# Patient Record
Sex: MALE | Race: OTHER | HISPANIC OR LATINO | URBAN - METROPOLITAN AREA
[De-identification: names, ages, dates, MRNs, and addresses within clinical notes are randomized per-mention and may not be internally consistent; named-entity substitution may affect disease eponyms.]

---

## 2018-05-10 ENCOUNTER — INPATIENT (INPATIENT)
Facility: HOSPITAL | Age: 58
LOS: 7 days | Discharge: HOME | DRG: 615 | End: 2018-05-18
Attending: NEUROLOGICAL SURGERY | Admitting: NEUROLOGICAL SURGERY
Payer: COMMERCIAL

## 2018-05-10 VITALS
OXYGEN SATURATION: 98 % | TEMPERATURE: 98 F | HEART RATE: 92 BPM | SYSTOLIC BLOOD PRESSURE: 175 MMHG | RESPIRATION RATE: 17 BRPM | DIASTOLIC BLOOD PRESSURE: 98 MMHG

## 2018-05-10 DIAGNOSIS — K21.9 GASTRO-ESOPHAGEAL REFLUX DISEASE WITHOUT ESOPHAGITIS: ICD-10-CM

## 2018-05-10 DIAGNOSIS — I10 ESSENTIAL (PRIMARY) HYPERTENSION: ICD-10-CM

## 2018-05-10 DIAGNOSIS — D35.2 BENIGN NEOPLASM OF PITUITARY GLAND: ICD-10-CM

## 2018-05-10 DIAGNOSIS — Z90.49 ACQUIRED ABSENCE OF OTHER SPECIFIED PARTS OF DIGESTIVE TRACT: Chronic | ICD-10-CM

## 2018-05-10 RX ORDER — DEXAMETHASONE 0.5 MG/5ML
4 ELIXIR ORAL EVERY 6 HOURS
Qty: 0 | Refills: 0 | Status: DISCONTINUED | OUTPATIENT
Start: 2018-05-11 | End: 2018-05-11

## 2018-05-10 RX ORDER — DEXAMETHASONE 0.5 MG/5ML
10 ELIXIR ORAL EVERY 6 HOURS
Qty: 0 | Refills: 0 | Status: COMPLETED | OUTPATIENT
Start: 2018-05-10 | End: 2018-05-11

## 2018-05-10 RX ORDER — SENNA PLUS 8.6 MG/1
2 TABLET ORAL AT BEDTIME
Qty: 0 | Refills: 0 | Status: DISCONTINUED | OUTPATIENT
Start: 2018-05-10 | End: 2018-05-17

## 2018-05-10 RX ORDER — ACETAMINOPHEN 500 MG
650 TABLET ORAL EVERY 6 HOURS
Qty: 0 | Refills: 0 | Status: DISCONTINUED | OUTPATIENT
Start: 2018-05-10 | End: 2018-05-17

## 2018-05-10 RX ORDER — INSULIN LISPRO 100/ML
VIAL (ML) SUBCUTANEOUS
Qty: 0 | Refills: 0 | Status: DISCONTINUED | OUTPATIENT
Start: 2018-05-10 | End: 2018-05-17

## 2018-05-10 RX ORDER — ENOXAPARIN SODIUM 100 MG/ML
40 INJECTION SUBCUTANEOUS AT BEDTIME
Qty: 0 | Refills: 0 | Status: DISCONTINUED | OUTPATIENT
Start: 2018-05-10 | End: 2018-05-14

## 2018-05-10 RX ORDER — LOSARTAN POTASSIUM 100 MG/1
100 TABLET, FILM COATED ORAL DAILY
Qty: 0 | Refills: 0 | Status: DISCONTINUED | OUTPATIENT
Start: 2018-05-10 | End: 2018-05-17

## 2018-05-10 RX ORDER — PANTOPRAZOLE SODIUM 20 MG/1
40 TABLET, DELAYED RELEASE ORAL DAILY
Qty: 0 | Refills: 0 | Status: DISCONTINUED | OUTPATIENT
Start: 2018-05-10 | End: 2018-05-17

## 2018-05-10 RX ORDER — DOCUSATE SODIUM 100 MG
100 CAPSULE ORAL THREE TIMES A DAY
Qty: 0 | Refills: 0 | Status: DISCONTINUED | OUTPATIENT
Start: 2018-05-10 | End: 2018-05-17

## 2018-05-10 RX ORDER — DEXAMETHASONE 0.5 MG/5ML
ELIXIR ORAL
Qty: 0 | Refills: 0 | Status: DISCONTINUED | OUTPATIENT
Start: 2018-05-11 | End: 2018-05-11

## 2018-05-10 RX ADMIN — Medication 102 MILLIGRAM(S): at 19:40

## 2018-05-10 RX ADMIN — ENOXAPARIN SODIUM 40 MILLIGRAM(S): 100 INJECTION SUBCUTANEOUS at 21:41

## 2018-05-10 RX ADMIN — Medication 650 MILLIGRAM(S): at 17:07

## 2018-05-10 RX ADMIN — Medication 650 MILLIGRAM(S): at 16:52

## 2018-05-10 RX ADMIN — LOSARTAN POTASSIUM 100 MILLIGRAM(S): 100 TABLET, FILM COATED ORAL at 16:53

## 2018-05-10 NOTE — H&P ADULT - ASSESSMENT
57 year old male w/ HTN with pituitary adenoma and left CN VI palsy transferred from outside facility for further neurosurgical management.

## 2018-05-10 NOTE — H&P ADULT - ATTENDING COMMENTS
Patient seen and examined. Agree with above note. Patient presented with L retro/periorbital headaches and L VI nerve palsy, as well as decreased VA in OS. 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 Invasion of L cavernous sinus explains L VI palsy, but decreased acuity is not explained by MRI findings, since lesion does not reach optic apparatus superiorly. Differential diagnosis is broad and includes: invasive pituitary adenoma, chordoma, chondrosarcoma, mets, and other sellar region pathologies. Hormonal workup is normal, except for a slight increase in PRL to 30. Patient will require endoscopic endonasal resection for diagnosis and debulking. Cavernous sinus component would likely remain and require radiation therapy. Plan for OR on Tuesday 05/15/18 with ENT (Dr. Stewart). Neuro-ophthalmology to assess patient preop.

## 2018-05-10 NOTE — H&P ADULT - HISTORY OF PRESENT ILLNESS
57 year old Czech speaking male with HTN presenting to [facility] this past weekend with complaints of several weeks of dizziness, vomiting and blurriness of vision in the left eye. He has seen his PCP for these symptoms which would intermittently resolve. He called EMS when symptoms worsened with multiple episodes of vomiting. 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 Uses Aspirin daily as outpatient. Denies any hearing changes, bowel/bladder changes, extremity weakness, falls, seizures, syncope. Transferred to [facility] for definitive treatment.

## 2018-05-10 NOTE — H&P ADULT - NSHPPHYSICALEXAM_GEN_ALL_CORE
Gen: NAD, AAOx3  HEENT: PERRL. Unable to look to left lateral with left eye. VF grossly intact. NC/AT.  Neck: FROM, nontender  Lungs: Clear b/l. No W/R/R.  Heart: S1, S2. NSR. No murmurs.  Abd: Soft, obese, NT/ND. +BS  Exts: Pulses 2+ throughout  Neuro: Left CN VI palsy, o/w CNs II-XII intact. 5/5 str x4 extremities. Sensation to LT intact. Following commands.

## 2018-05-10 NOTE — PATIENT PROFILE ADULT. - VISION (WITH CORRECTIVE LENSES IF THE PATIENT USUALLY WEARS THEM):
Normal vision: sees adequately in most situations; can see medication labels, newsprint/blurry vision upon admission due to tumor. Patients baseline is normal with no glasses needed

## 2018-05-10 NOTE — H&P ADULT - PROBLEM SELECTOR PLAN 1
Will d/w Dr. Valenzuela for further management and timing of surgical intervention,  Outside imaging to be uploaded for review

## 2018-05-11 LAB
ANION GAP SERPL CALC-SCNC: 12 MMOL/L — SIGNIFICANT CHANGE UP (ref 5–17)
APTT BLD: 35.3 SEC — SIGNIFICANT CHANGE UP (ref 27.5–37.4)
BUN SERPL-MCNC: 11 MG/DL — SIGNIFICANT CHANGE UP (ref 7–23)
CALCIUM SERPL-MCNC: 10 MG/DL — SIGNIFICANT CHANGE UP (ref 8.4–10.5)
CHLORIDE SERPL-SCNC: 101 MMOL/L — SIGNIFICANT CHANGE UP (ref 96–108)
CO2 SERPL-SCNC: 28 MMOL/L — SIGNIFICANT CHANGE UP (ref 22–31)
CREAT SERPL-MCNC: 1.07 MG/DL — SIGNIFICANT CHANGE UP (ref 0.5–1.3)
GLUCOSE SERPL-MCNC: 150 MG/DL — HIGH (ref 70–99)
HBA1C BLD-MCNC: 5.6 % — SIGNIFICANT CHANGE UP (ref 4–5.6)
HCT VFR BLD CALC: 50.1 % — HIGH (ref 39–50)
HGB BLD-MCNC: 17.4 G/DL — HIGH (ref 13–17)
INR BLD: 1.09 — SIGNIFICANT CHANGE UP (ref 0.88–1.16)
MAGNESIUM SERPL-MCNC: 2.3 MG/DL — SIGNIFICANT CHANGE UP (ref 1.6–2.6)
MCHC RBC-ENTMCNC: 31.2 PG — SIGNIFICANT CHANGE UP (ref 27–34)
MCHC RBC-ENTMCNC: 34.7 G/DL — SIGNIFICANT CHANGE UP (ref 32–36)
MCV RBC AUTO: 89.8 FL — SIGNIFICANT CHANGE UP (ref 80–100)
PHOSPHATE SERPL-MCNC: 1.8 MG/DL — LOW (ref 2.5–4.5)
PLATELET # BLD AUTO: 217 K/UL — SIGNIFICANT CHANGE UP (ref 150–400)
POTASSIUM SERPL-MCNC: 5.5 MMOL/L — HIGH (ref 3.5–5.3)
POTASSIUM SERPL-SCNC: 5.5 MMOL/L — HIGH (ref 3.5–5.3)
PROTHROM AB SERPL-ACNC: 12.1 SEC — SIGNIFICANT CHANGE UP (ref 9.8–12.7)
RBC # BLD: 5.58 M/UL — SIGNIFICANT CHANGE UP (ref 4.2–5.8)
RBC # FLD: 12.3 % — SIGNIFICANT CHANGE UP (ref 10.3–16.9)
SODIUM SERPL-SCNC: 141 MMOL/L — SIGNIFICANT CHANGE UP (ref 135–145)
WBC # BLD: 5.4 K/UL — SIGNIFICANT CHANGE UP (ref 3.8–10.5)
WBC # FLD AUTO: 5.4 K/UL — SIGNIFICANT CHANGE UP (ref 3.8–10.5)

## 2018-05-11 PROCEDURE — 71045 X-RAY EXAM CHEST 1 VIEW: CPT | Mod: 26

## 2018-05-11 RX ORDER — METOPROLOL TARTRATE 50 MG
5 TABLET ORAL ONCE
Qty: 0 | Refills: 0 | Status: COMPLETED | OUTPATIENT
Start: 2018-05-11 | End: 2018-05-11

## 2018-05-11 RX ORDER — DEXAMETHASONE 0.5 MG/5ML
4 ELIXIR ORAL EVERY 6 HOURS
Qty: 0 | Refills: 0 | Status: DISCONTINUED | OUTPATIENT
Start: 2018-05-11 | End: 2018-05-17

## 2018-05-11 RX ADMIN — Medication 2: at 21:46

## 2018-05-11 RX ADMIN — Medication 4: at 11:53

## 2018-05-11 RX ADMIN — Medication 5 MILLIGRAM(S): at 14:16

## 2018-05-11 RX ADMIN — Medication 4 MILLIGRAM(S): at 19:54

## 2018-05-11 RX ADMIN — LOSARTAN POTASSIUM 100 MILLIGRAM(S): 100 TABLET, FILM COATED ORAL at 06:17

## 2018-05-11 RX ADMIN — Medication 102 MILLIGRAM(S): at 11:53

## 2018-05-11 RX ADMIN — PANTOPRAZOLE SODIUM 40 MILLIGRAM(S): 20 TABLET, DELAYED RELEASE ORAL at 11:54

## 2018-05-11 RX ADMIN — Medication 8: at 18:06

## 2018-05-11 RX ADMIN — Medication 102 MILLIGRAM(S): at 06:17

## 2018-05-11 RX ADMIN — Medication 102 MILLIGRAM(S): at 01:00

## 2018-05-11 RX ADMIN — ENOXAPARIN SODIUM 40 MILLIGRAM(S): 100 INJECTION SUBCUTANEOUS at 21:47

## 2018-05-11 NOTE — PROGRESS NOTE ADULT - SUBJECTIVE AND OBJECTIVE BOX
HPI:  57 year old Hungarian speaking male with HTN presenting to [facility] this past weekend with complaints of several weeks of dizziness, vomiting and blurriness of vision in the left eye. He has seen his PCP for these symptoms which would intermittently resolve. He called EMS when symptoms worsened with multiple episodes of vomiting. 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 Uses Aspirin daily as outpatient. Denies any hearing changes, bowel/bladder changes, extremity weakness, falls, seizures, syncope. Transferred to [facility] for definitive treatment. (10 May 2018 16:32)    OVERNIGHT EVENTS: BETHEL overnight. Pt reports consistent left eye blurry vision. Denies headache, nausea, acute weakness/loss of sensation of extremities.    Vital Signs Last 24 Hrs  T(C): 36.4 (11 May 2018 08:35), Max: 37 (10 May 2018 17:31)  T(F): 97.5 (11 May 2018 08:35), Max: 98.6 (10 May 2018 17:31)  HR: 89 (11 May 2018 08:35) (69 - 92)  BP: 146/84 (11 May 2018 08:35) (121/79 - 175/98)  BP(mean): --  RR: 16 (11 May 2018 08:35) (16 - 17)  SpO2: 96% (11 May 2018 08:35) (96% - 98%)    I&O's Summary    10 May 2018 07:01  -  11 May 2018 07:00  --------------------------------------------------------  IN: 720 mL / OUT: 500 mL / NET: 220 mL    PHYSICAL EXAM:  Neurological: AAOx3, FC, speech coherent  CNII-XII: R EOM intact, L CN6 palsy, visual fields intact, PERRL, face symmetric, tongue midline  Motor: MAEx4 5/5 UE and LE B/L  SILT throughout         [x] warm well perfused; capillary refill <3 seconds     TUBES/LINES:  [] Ibry  [] Lumbar Drain  [] Wound Drains  [] Others    DIET:  [] NPO  [x] Mechanical  [] Tube feeds    LABS:                        17.4   5.4   )-----------( 217      ( 11 May 2018 06:45 )             50.1     05-11    141  |  101  |  11  ----------------------------<  150<H>  5.5<H>   |  28  |  1.07    Ca    10.0      11 May 2018 06:45  Phos  1.8     05-11  Mg     2.3     05-11      PT/INR - ( 11 May 2018 06:45 )   PT: 12.1 sec;   INR: 1.09          PTT - ( 11 May 2018 06:45 )  PTT:35.3 sec        CAPILLARY BLOOD GLUCOSE      POCT Blood Glucose.: 114 mg/dL (11 May 2018 08:00)  POCT Blood Glucose.: 124 mg/dL (10 May 2018 21:44)      Drug Levels: [] N/A    CSF Analysis: [] N/A      Allergies    penicillin (Rash)    Intolerances      MEDICATIONS:  Antibiotics:    Neuro:  acetaminophen   Tablet 650 milliGRAM(s) Oral every 6 hours PRN  acetaminophen   Tablet. 650 milliGRAM(s) Oral every 6 hours PRN    Anticoagulation:  enoxaparin Injectable 40 milliGRAM(s) SubCutaneous at bedtime    OTHER:  dexamethasone  IVPB 10 milliGRAM(s) IV Intermittent every 6 hours  dexamethasone  IVPB 4 milliGRAM(s) IV Intermittent every 6 hours  dexamethasone  IVPB   IV Intermittent   docusate sodium 100 milliGRAM(s) Oral three times a day PRN  insulin lispro (HumaLOG) corrective regimen sliding scale   SubCutaneous Before meals and at bedtime  losartan 100 milliGRAM(s) Oral daily  pantoprazole    Tablet 40 milliGRAM(s) Oral daily  senna 2 Tablet(s) Oral at bedtime PRN    IVF:    CULTURES:    RADIOLOGY & ADDITIONAL TESTS:      ASSESSMENT:  57y Male w/ HTN with pituitary adenoma and left CN VI palsy transferred from outside facility for further neurosurgical management    BRAIN TUMOR  No h/o HF  No pertinent family history in first degree relatives  Handoff  MEWS Score  HTN (hypertension)  GERD (gastroesophageal reflux disease)  HTN (hypertension)  Pituitary adenoma  History of cholecystectomy      PLAN:  -neuro checks  -continue decadron 4mg q6h  -PPI while on steroids and hx of GERD  -continue home antihypertensive losartan  -neurosurgical planning for next week  -ENT consulted: recommend CT sinus w/o contrast  -consult endo: f/u recs  -will need medical clearance: f/u recs  -DVT prophylaxis: SCDs, SQL  -PT/OOB  -Dispo pending  -D/w Dr. Valenzuela

## 2018-05-12 LAB
ANION GAP SERPL CALC-SCNC: 13 MMOL/L — SIGNIFICANT CHANGE UP (ref 5–17)
BUN SERPL-MCNC: 18 MG/DL — SIGNIFICANT CHANGE UP (ref 7–23)
CALCIUM SERPL-MCNC: 10.1 MG/DL — SIGNIFICANT CHANGE UP (ref 8.4–10.5)
CHLORIDE SERPL-SCNC: 99 MMOL/L — SIGNIFICANT CHANGE UP (ref 96–108)
CO2 SERPL-SCNC: 28 MMOL/L — SIGNIFICANT CHANGE UP (ref 22–31)
CREAT SERPL-MCNC: 1 MG/DL — SIGNIFICANT CHANGE UP (ref 0.5–1.3)
GLUCOSE SERPL-MCNC: 128 MG/DL — HIGH (ref 70–99)
HCT VFR BLD CALC: 46.3 % — SIGNIFICANT CHANGE UP (ref 39–50)
HGB BLD-MCNC: 16.1 G/DL — SIGNIFICANT CHANGE UP (ref 13–17)
MAGNESIUM SERPL-MCNC: 2.2 MG/DL — SIGNIFICANT CHANGE UP (ref 1.6–2.6)
MCHC RBC-ENTMCNC: 31.5 PG — SIGNIFICANT CHANGE UP (ref 27–34)
MCHC RBC-ENTMCNC: 34.8 G/DL — SIGNIFICANT CHANGE UP (ref 32–36)
MCV RBC AUTO: 90.6 FL — SIGNIFICANT CHANGE UP (ref 80–100)
PHOSPHATE SERPL-MCNC: 4.4 MG/DL — SIGNIFICANT CHANGE UP (ref 2.5–4.5)
PLATELET # BLD AUTO: 230 K/UL — SIGNIFICANT CHANGE UP (ref 150–400)
POTASSIUM SERPL-MCNC: SIGNIFICANT CHANGE UP MMOL/L (ref 3.5–5.3)
POTASSIUM SERPL-SCNC: SIGNIFICANT CHANGE UP MMOL/L (ref 3.5–5.3)
RBC # BLD: 5.11 M/UL — SIGNIFICANT CHANGE UP (ref 4.2–5.8)
RBC # FLD: 13 % — SIGNIFICANT CHANGE UP (ref 10.3–16.9)
SODIUM SERPL-SCNC: 140 MMOL/L — SIGNIFICANT CHANGE UP (ref 135–145)
WBC # BLD: 24.7 K/UL — HIGH (ref 3.8–10.5)
WBC # FLD AUTO: 24.7 K/UL — HIGH (ref 3.8–10.5)

## 2018-05-12 RX ADMIN — PANTOPRAZOLE SODIUM 40 MILLIGRAM(S): 20 TABLET, DELAYED RELEASE ORAL at 12:56

## 2018-05-12 RX ADMIN — Medication 6: at 12:56

## 2018-05-12 RX ADMIN — Medication 6: at 22:00

## 2018-05-12 RX ADMIN — ENOXAPARIN SODIUM 40 MILLIGRAM(S): 100 INJECTION SUBCUTANEOUS at 22:52

## 2018-05-12 RX ADMIN — Medication 4 MILLIGRAM(S): at 00:54

## 2018-05-12 RX ADMIN — LOSARTAN POTASSIUM 100 MILLIGRAM(S): 100 TABLET, FILM COATED ORAL at 06:28

## 2018-05-12 RX ADMIN — Medication 4 MILLIGRAM(S): at 17:43

## 2018-05-12 RX ADMIN — Medication 4 MILLIGRAM(S): at 12:56

## 2018-05-12 RX ADMIN — Medication 4 MILLIGRAM(S): at 06:28

## 2018-05-12 NOTE — PROGRESS NOTE ADULT - SUBJECTIVE AND OBJECTIVE BOX
HPI:  57 year old Malay speaking male with HTN presenting to [facility] this past weekend with complaints of several weeks of dizziness, vomiting and blurriness of vision in the left eye. He has seen his PCP for these symptoms which would intermittently resolve. He called EMS when symptoms worsened with multiple episodes of vomiting. 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 Uses Aspirin daily as outpatient. Denies any hearing changes, bowel/bladder changes, extremity weakness, falls, seizures, syncope. Transferred to [facility] for definitive treatment. (10 May 2018 16:32)    OVERNIGHT EVENTS: BETHEL overnight. Neuro stable.    Vital Signs Last 24 Hrs  T(C): 37.2 (12 May 2018 09:20), Max: 37.2 (12 May 2018 09:20)  T(F): 98.9 (12 May 2018 09:20), Max: 98.9 (12 May 2018 09:20)  HR: 99 (12 May 2018 09:20) (77 - 101)  BP: 154/92 (12 May 2018 09:20) (134/82 - 177/93)  BP(mean): --  RR: 16 (12 May 2018 09:20) (16 - 18)  SpO2: 96% (12 May 2018 09:20) (95% - 96%)    I&O's Summary    11 May 2018 07:01  -  12 May 2018 07:00  --------------------------------------------------------  IN: 780 mL / OUT: 650 mL / NET: 130 mL    PHYSICAL EXAM:  Neurological: AAOx3, FC, speech coherent  CNII-XII: R EOM intact, L CN6 palsy, visual fields intact, PERRL, face symmetric, tongue midline  Motor: MAEx4 5/5 UE and LE B/L  SILT throughout         [x] warm well perfused; capillary refill <3 seconds     TUBES/LINES:  [] Irby  [] Lumbar Drain  [] Wound Drains  [] Others    DIET:  [] NPO  [x] Mechanical  [] Tube feeds    LABS:                        16.1   24.7  )-----------( 230      ( 12 May 2018 08:01 )             46.3     05-12    140  |  99  |  18  ----------------------------<  128<H>  see note   |  28  |  1.00    Ca    10.1      12 May 2018 08:01  Phos  4.4     05-12  Mg     2.2     05-12      PT/INR - ( 11 May 2018 06:45 )   PT: 12.1 sec;   INR: 1.09          PTT - ( 11 May 2018 06:45 )  PTT:35.3 sec        CAPILLARY BLOOD GLUCOSE      POCT Blood Glucose.: 122 mg/dL (12 May 2018 07:53)  POCT Blood Glucose.: 192 mg/dL (11 May 2018 21:19)  POCT Blood Glucose.: 315 mg/dL (11 May 2018 17:39)  POCT Blood Glucose.: 224 mg/dL (11 May 2018 11:37)      Drug Levels: [] N/A    CSF Analysis: [] N/A      Allergies    penicillin (Rash)    Intolerances      MEDICATIONS:  Antibiotics:    Neuro:  acetaminophen   Tablet 650 milliGRAM(s) Oral every 6 hours PRN  acetaminophen   Tablet. 650 milliGRAM(s) Oral every 6 hours PRN    Anticoagulation:  enoxaparin Injectable 40 milliGRAM(s) SubCutaneous at bedtime    OTHER:  dexamethasone     Tablet 4 milliGRAM(s) Oral every 6 hours  docusate sodium 100 milliGRAM(s) Oral three times a day PRN  insulin lispro (HumaLOG) corrective regimen sliding scale   SubCutaneous Before meals and at bedtime  losartan 100 milliGRAM(s) Oral daily  pantoprazole    Tablet 40 milliGRAM(s) Oral daily  senna 2 Tablet(s) Oral at bedtime PRN    IVF:    CULTURES:    RADIOLOGY & ADDITIONAL TESTS:      ASSESSMENT:  57y Male w/ HTN with pituitary adenoma and left CN VI palsy transferred from outside facility for further neurosurgical management    BRAIN TUMOR  No h/o HF  No pertinent family history in first degree relatives  Handoff  MEWS Score  HTN (hypertension)  GERD (gastroesophageal reflux disease)  HTN (hypertension)  Pituitary adenoma  History of cholecystectomy      PLAN:  -neuro checks  -continue decadron 4mg q6h  -PPI while on steroids and hx of GERD  -continue home antihypertensive losartan  -neurosurgical planning for OR Tuesday 5/15/18  -ENT recs appreciated  -f/u neuro-ophthalmology recs  -will need medical clearance: f/u recs  -DVT prophylaxis: SCDs, SQL  -PT/OOB  -Dispo pending  -D/w Dr. Valenzuela

## 2018-05-12 NOTE — PHYSICAL THERAPY INITIAL EVALUATION ADULT - PERTINENT HX OF CURRENT PROBLEM, REHAB EVAL
57 year old male presenting to [facility] this past weekend with complaints of several weeks of dizziness, vomiting and blurriness of vision in the left eye. 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 Transferred to [facility] for definitive treatment.

## 2018-05-13 LAB
ANION GAP SERPL CALC-SCNC: 11 MMOL/L — SIGNIFICANT CHANGE UP (ref 5–17)
APPEARANCE UR: CLEAR — SIGNIFICANT CHANGE UP
BILIRUB UR-MCNC: NEGATIVE — SIGNIFICANT CHANGE UP
BUN SERPL-MCNC: 20 MG/DL — SIGNIFICANT CHANGE UP (ref 7–23)
CALCIUM SERPL-MCNC: 9.4 MG/DL — SIGNIFICANT CHANGE UP (ref 8.4–10.5)
CHLORIDE SERPL-SCNC: 100 MMOL/L — SIGNIFICANT CHANGE UP (ref 96–108)
CO2 SERPL-SCNC: 30 MMOL/L — SIGNIFICANT CHANGE UP (ref 22–31)
COLOR SPEC: YELLOW — SIGNIFICANT CHANGE UP
CREAT SERPL-MCNC: 1.02 MG/DL — SIGNIFICANT CHANGE UP (ref 0.5–1.3)
DIFF PNL FLD: (no result)
GLUCOSE SERPL-MCNC: 126 MG/DL — HIGH (ref 70–99)
GLUCOSE UR QL: NEGATIVE — SIGNIFICANT CHANGE UP
HCT VFR BLD CALC: 46.4 % — SIGNIFICANT CHANGE UP (ref 39–50)
HGB BLD-MCNC: 15.7 G/DL — SIGNIFICANT CHANGE UP (ref 13–17)
KETONES UR-MCNC: NEGATIVE — SIGNIFICANT CHANGE UP
LEUKOCYTE ESTERASE UR-ACNC: NEGATIVE — SIGNIFICANT CHANGE UP
MCHC RBC-ENTMCNC: 31 PG — SIGNIFICANT CHANGE UP (ref 27–34)
MCHC RBC-ENTMCNC: 33.8 G/DL — SIGNIFICANT CHANGE UP (ref 32–36)
MCV RBC AUTO: 91.7 FL — SIGNIFICANT CHANGE UP (ref 80–100)
NITRITE UR-MCNC: NEGATIVE — SIGNIFICANT CHANGE UP
PH UR: 7 — SIGNIFICANT CHANGE UP (ref 5–8)
PLATELET # BLD AUTO: 252 K/UL — SIGNIFICANT CHANGE UP (ref 150–400)
POTASSIUM SERPL-MCNC: 4.7 MMOL/L — SIGNIFICANT CHANGE UP (ref 3.5–5.3)
POTASSIUM SERPL-SCNC: 4.7 MMOL/L — SIGNIFICANT CHANGE UP (ref 3.5–5.3)
PROT UR-MCNC: NEGATIVE MG/DL — SIGNIFICANT CHANGE UP
RBC # BLD: 5.06 M/UL — SIGNIFICANT CHANGE UP (ref 4.2–5.8)
RBC # FLD: 13.3 % — SIGNIFICANT CHANGE UP (ref 10.3–16.9)
SODIUM SERPL-SCNC: 141 MMOL/L — SIGNIFICANT CHANGE UP (ref 135–145)
SP GR SPEC: 1.01 — SIGNIFICANT CHANGE UP (ref 1–1.03)
UROBILINOGEN FLD QL: 0.2 E.U./DL — SIGNIFICANT CHANGE UP
WBC # BLD: 20.2 K/UL — HIGH (ref 3.8–10.5)
WBC # FLD AUTO: 20.2 K/UL — HIGH (ref 3.8–10.5)

## 2018-05-13 PROCEDURE — 70552 MRI BRAIN STEM W/DYE: CPT | Mod: 26

## 2018-05-13 PROCEDURE — 70486 CT MAXILLOFACIAL W/O DYE: CPT | Mod: 26

## 2018-05-13 RX ADMIN — Medication 4: at 17:14

## 2018-05-13 RX ADMIN — PANTOPRAZOLE SODIUM 40 MILLIGRAM(S): 20 TABLET, DELAYED RELEASE ORAL at 11:33

## 2018-05-13 RX ADMIN — Medication 2: at 11:34

## 2018-05-13 RX ADMIN — Medication 4: at 21:39

## 2018-05-13 RX ADMIN — Medication 4 MILLIGRAM(S): at 00:51

## 2018-05-13 RX ADMIN — LOSARTAN POTASSIUM 100 MILLIGRAM(S): 100 TABLET, FILM COATED ORAL at 06:09

## 2018-05-13 RX ADMIN — Medication 4 MILLIGRAM(S): at 17:14

## 2018-05-13 RX ADMIN — ENOXAPARIN SODIUM 40 MILLIGRAM(S): 100 INJECTION SUBCUTANEOUS at 22:00

## 2018-05-13 RX ADMIN — Medication 4 MILLIGRAM(S): at 11:33

## 2018-05-13 RX ADMIN — Medication 4 MILLIGRAM(S): at 06:10

## 2018-05-13 NOTE — PROGRESS NOTE ADULT - SUBJECTIVE AND OBJECTIVE BOX
Pt was examined at the bedside, no acute events overnight, pt denies sob,cp, n/v    ICU Vital Signs Last 24 Hrs  T(C): 35.7 (13 May 2018 05:26), Max: 37.2 (12 May 2018 09:20)  T(F): 96.2 (13 May 2018 05:26), Max: 98.9 (12 May 2018 09:20)  HR: 73 (13 May 2018 05:26) (73 - 99)  BP: 145/84 (13 May 2018 05:26) (136/80 - 154/92)  BP(mean): --  ABP: --  ABP(mean): --  RR: 17 (13 May 2018 05:26) (16 - 17)  SpO2: 96% (13 May 2018 05:26) (95% - 97%)      Exam:  AA&Ox3, NAD,   PERRL, R eye EOMI, L eye 6th CN palsy, natividad symmetric,  Motor; 5/5 x 4 extr, no drift,  sensation to LT grossly intact throughout,    Plan:  Dec 4q6,  SQL fro DVT prophylaxis,  Dr. Foreman - mayra consult for clearance  Ophtho consult called, awaiting eval & recs  Ent note is appreciated,  Tentatively scheduled for OR for [facility] 5/15  D/w Dr. Valenzuela

## 2018-05-13 NOTE — PROGRESS NOTE ADULT - SUBJECTIVE AND OBJECTIVE BOX
HPI:  57 year old Bulgarian speaking male with HTN presenting to [facility] this past weekend with complaints of several weeks of dizziness, vomiting and blurriness of vision in the left eye. He has seen his PCP for these symptoms which would intermittently resolve. He called EMS when symptoms worsened with multiple episodes of vomiting. 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 Uses Aspirin daily as outpatient. Denies any hearing changes, bowel/bladder changes, extremity weakness, falls, seizures, syncope. Transferred to [facility] for definitive treatment. (10 May 2018 16:32)    OVERNIGHT EVENTS:  Pt continues to complain of L blurry vision. Not worsening.     Vital Signs Last 24 Hrs  T(C): 36.2 (13 May 2018 08:10), Max: 36.8 (12 May 2018 21:37)  T(F): 97.1 (13 May 2018 08:10), Max: 98.3 (12 May 2018 21:37)  HR: 74 (13 May 2018 08:10) (73 - 95)  BP: 145/84 (13 May 2018 08:10) (136/80 - 153/89)  BP(mean): --  RR: 17 (13 May 2018 08:10) (16 - 17)  SpO2: 95% (13 May 2018 08:10) (95% - 97%)    I&O's Summary    12 May 2018 07:01  -  13 May 2018 07:00  --------------------------------------------------------  IN: 1040 mL / OUT: 1300 mL / NET: -260 mL        PHYSICAL EXAM:  Neurological:  AxOx3  L decreased VA  VF grossly intact  L CN VI palsy  no pronator drift  5/5 motor x4ext      TUBES/LINES:  [] Irby  [] Lumbar Drain  [] Wound Drains  [] Others      DIET:  [] NPO  [x] Mechanical  [] Tube feeds    LABS:                        15.7   20.2  )-----------( 252      ( 13 May 2018 07:34 )             46.4     05-13    141  |  100  |  20  ----------------------------<  126<H>  4.7   |  30  |  1.02    Ca    9.4      13 May 2018 07:34  Phos  4.4     05-12  Mg     2.2     05-12              CAPILLARY BLOOD GLUCOSE      POCT Blood Glucose.: 129 mg/dL (13 May 2018 07:27)  POCT Blood Glucose.: 262 mg/dL (12 May 2018 21:14)  POCT Blood Glucose.: 150 mg/dL (12 May 2018 17:07)  POCT Blood Glucose.: 295 mg/dL (12 May 2018 12:33)      Drug Levels: [] N/A    CSF Analysis: [] N/A      Allergies    penicillin (Rash)    Intolerances      MEDICATIONS:  Antibiotics:    Neuro:  acetaminophen   Tablet 650 milliGRAM(s) Oral every 6 hours PRN  acetaminophen   Tablet. 650 milliGRAM(s) Oral every 6 hours PRN    Anticoagulation:  enoxaparin Injectable 40 milliGRAM(s) SubCutaneous at bedtime    OTHER:  dexamethasone     Tablet 4 milliGRAM(s) Oral every 6 hours  docusate sodium 100 milliGRAM(s) Oral three times a day PRN  insulin lispro (HumaLOG) corrective regimen sliding scale   SubCutaneous Before meals and at bedtime  losartan 100 milliGRAM(s) Oral daily  pantoprazole    Tablet 40 milliGRAM(s) Oral daily  senna 2 Tablet(s) Oral at bedtime PRN    IVF:    CULTURES:    RADIOLOGY & ADDITIONAL TESTS:      ASSESSMENT:  57y Male w/pituitary mass    BRAIN TUMOR  No h/o HF  No pertinent family history in first degree relatives  Handoff  MEWS Score  HTN (hypertension)  GERD (gastroesophageal reflux disease)  HTN (hypertension)  Pituitary adenoma  History of cholecystectomy      PLAN:  -MR and CT navigational studies ordered  -preop for OR Claire Feliciano med clearance called  -Dr. Helio Franks neuroophthalmology called.  -D/W     DVT PROPHYLAXIS:  [x] Venodynes                                [x] Lovenox    DISPOSITION: HPI:  57 year old Maltese speaking male with HTN presenting to [facility] this past weekend with complaints of several weeks of dizziness, vomiting and blurriness of vision in the left eye. He has seen his PCP for these symptoms which would intermittently resolve. He called EMS when symptoms worsened with multiple episodes of vomiting. 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 Uses Aspirin daily as outpatient. Denies any hearing changes, bowel/bladder changes, extremity weakness, falls, seizures, syncope. Transferred to [facility] for definitive treatment. (10 May 2018 16:32)    OVERNIGHT EVENTS:  Pt continues to complain of L blurry vision. Not worsening.     Vital Signs Last 24 Hrs  T(C): 36.2 (13 May 2018 08:10), Max: 36.8 (12 May 2018 21:37)  T(F): 97.1 (13 May 2018 08:10), Max: 98.3 (12 May 2018 21:37)  HR: 74 (13 May 2018 08:10) (73 - 95)  BP: 145/84 (13 May 2018 08:10) (136/80 - 153/89)  BP(mean): --  RR: 17 (13 May 2018 08:10) (16 - 17)  SpO2: 95% (13 May 2018 08:10) (95% - 97%)    I&O's Summary    12 May 2018 07:01  -  13 May 2018 07:00  --------------------------------------------------------  IN: 1040 mL / OUT: 1300 mL / NET: -260 mL        PHYSICAL EXAM:  Neurological:  AxOx3  L decreased VA  VF grossly intact  L CN VI palsy  no pronator drift  5/5 motor x4ext      TUBES/LINES:  [] Irby  [] Lumbar Drain  [] Wound Drains  [] Others      DIET:  [] NPO  [x] Mechanical  [] Tube feeds    LABS:                        15.7   20.2  )-----------( 252      ( 13 May 2018 07:34 )             46.4     05-13    141  |  100  |  20  ----------------------------<  126<H>  4.7   |  30  |  1.02    Ca    9.4      13 May 2018 07:34  Phos  4.4     05-12  Mg     2.2     05-12              CAPILLARY BLOOD GLUCOSE      POCT Blood Glucose.: 129 mg/dL (13 May 2018 07:27)  POCT Blood Glucose.: 262 mg/dL (12 May 2018 21:14)  POCT Blood Glucose.: 150 mg/dL (12 May 2018 17:07)  POCT Blood Glucose.: 295 mg/dL (12 May 2018 12:33)      Drug Levels: [] N/A    CSF Analysis: [] N/A      Allergies    penicillin (Rash)    Intolerances      MEDICATIONS:  Antibiotics:    Neuro:  acetaminophen   Tablet 650 milliGRAM(s) Oral every 6 hours PRN  acetaminophen   Tablet. 650 milliGRAM(s) Oral every 6 hours PRN    Anticoagulation:  enoxaparin Injectable 40 milliGRAM(s) SubCutaneous at bedtime    OTHER:  dexamethasone     Tablet 4 milliGRAM(s) Oral every 6 hours  docusate sodium 100 milliGRAM(s) Oral three times a day PRN  insulin lispro (HumaLOG) corrective regimen sliding scale   SubCutaneous Before meals and at bedtime  losartan 100 milliGRAM(s) Oral daily  pantoprazole    Tablet 40 milliGRAM(s) Oral daily  senna 2 Tablet(s) Oral at bedtime PRN    IVF:    CULTURES:    RADIOLOGY & ADDITIONAL TESTS:      ASSESSMENT:  57y Male w/pituitary mass    BRAIN TUMOR  No h/o HF  No pertinent family history in first degree relatives  Handoff  MEWS Score  HTN (hypertension)  GERD (gastroesophageal reflux disease)  HTN (hypertension)  Pituitary adenoma  History of cholecystectomy      PLAN:  -MR and CT navigational studies ordered  -preop for OR Tue  - med clearance called  -leukocytosis likely secondary to decadron  -Dr. Helio Franks neuroophthalmology called.  -D/W     DVT PROPHYLAXIS:  [x] Venodynes                                [x] Lovenox    DISPOSITION:

## 2018-05-14 DIAGNOSIS — Z01.818 ENCOUNTER FOR OTHER PREPROCEDURAL EXAMINATION: ICD-10-CM

## 2018-05-14 DIAGNOSIS — R73.9 HYPERGLYCEMIA, UNSPECIFIED: ICD-10-CM

## 2018-05-14 LAB
ANION GAP SERPL CALC-SCNC: 10 MMOL/L — SIGNIFICANT CHANGE UP (ref 5–17)
BUN SERPL-MCNC: 20 MG/DL — SIGNIFICANT CHANGE UP (ref 7–23)
CALCIUM SERPL-MCNC: 9.5 MG/DL — SIGNIFICANT CHANGE UP (ref 8.4–10.5)
CHLORIDE SERPL-SCNC: 98 MMOL/L — SIGNIFICANT CHANGE UP (ref 96–108)
CO2 SERPL-SCNC: 31 MMOL/L — SIGNIFICANT CHANGE UP (ref 22–31)
CREAT SERPL-MCNC: 0.96 MG/DL — SIGNIFICANT CHANGE UP (ref 0.5–1.3)
GLUCOSE SERPL-MCNC: 133 MG/DL — HIGH (ref 70–99)
HCT VFR BLD CALC: 47.2 % — SIGNIFICANT CHANGE UP (ref 39–50)
HGB BLD-MCNC: 15.9 G/DL — SIGNIFICANT CHANGE UP (ref 13–17)
MCHC RBC-ENTMCNC: 30.9 PG — SIGNIFICANT CHANGE UP (ref 27–34)
MCHC RBC-ENTMCNC: 33.7 G/DL — SIGNIFICANT CHANGE UP (ref 32–36)
MCV RBC AUTO: 91.7 FL — SIGNIFICANT CHANGE UP (ref 80–100)
MRSA PCR RESULT.: POSITIVE
PLATELET # BLD AUTO: 233 K/UL — SIGNIFICANT CHANGE UP (ref 150–400)
POTASSIUM SERPL-MCNC: 4.5 MMOL/L — SIGNIFICANT CHANGE UP (ref 3.5–5.3)
POTASSIUM SERPL-SCNC: 4.5 MMOL/L — SIGNIFICANT CHANGE UP (ref 3.5–5.3)
RBC # BLD: 5.15 M/UL — SIGNIFICANT CHANGE UP (ref 4.2–5.8)
RBC # FLD: 13 % — SIGNIFICANT CHANGE UP (ref 10.3–16.9)
S AUREUS DNA NOSE QL NAA+PROBE: POSITIVE
SODIUM SERPL-SCNC: 139 MMOL/L — SIGNIFICANT CHANGE UP (ref 135–145)
WBC # BLD: 15.5 K/UL — HIGH (ref 3.8–10.5)
WBC # FLD AUTO: 15.5 K/UL — HIGH (ref 3.8–10.5)

## 2018-05-14 PROCEDURE — 93010 ELECTROCARDIOGRAM REPORT: CPT

## 2018-05-14 PROCEDURE — 99223 1ST HOSP IP/OBS HIGH 75: CPT | Mod: GC

## 2018-05-14 RX ORDER — ENOXAPARIN SODIUM 100 MG/ML
40 INJECTION SUBCUTANEOUS AT BEDTIME
Qty: 0 | Refills: 0 | Status: DISCONTINUED | OUTPATIENT
Start: 2018-05-14 | End: 2018-05-14

## 2018-05-14 RX ORDER — MUPIROCIN 20 MG/G
1 OINTMENT TOPICAL EVERY 12 HOURS
Qty: 0 | Refills: 0 | Status: DISCONTINUED | OUTPATIENT
Start: 2018-05-14 | End: 2018-05-17

## 2018-05-14 RX ORDER — AMLODIPINE BESYLATE 2.5 MG/1
5 TABLET ORAL DAILY
Qty: 0 | Refills: 0 | Status: DISCONTINUED | OUTPATIENT
Start: 2018-05-14 | End: 2018-05-17

## 2018-05-14 RX ORDER — SODIUM CHLORIDE 9 MG/ML
1000 INJECTION INTRAMUSCULAR; INTRAVENOUS; SUBCUTANEOUS
Qty: 0 | Refills: 0 | Status: DISCONTINUED | OUTPATIENT
Start: 2018-05-15 | End: 2018-05-16

## 2018-05-14 RX ADMIN — LOSARTAN POTASSIUM 100 MILLIGRAM(S): 100 TABLET, FILM COATED ORAL at 05:45

## 2018-05-14 RX ADMIN — PANTOPRAZOLE SODIUM 40 MILLIGRAM(S): 20 TABLET, DELAYED RELEASE ORAL at 11:44

## 2018-05-14 RX ADMIN — Medication 2: at 17:04

## 2018-05-14 RX ADMIN — Medication 4 MILLIGRAM(S): at 00:05

## 2018-05-14 RX ADMIN — Medication 4 MILLIGRAM(S): at 17:04

## 2018-05-14 RX ADMIN — Medication 4 MILLIGRAM(S): at 23:22

## 2018-05-14 RX ADMIN — Medication 4: at 11:44

## 2018-05-14 RX ADMIN — Medication 4 MILLIGRAM(S): at 05:45

## 2018-05-14 RX ADMIN — Medication 4 MILLIGRAM(S): at 11:44

## 2018-05-14 NOTE — CONSULT NOTE ADULT - SUBJECTIVE AND OBJECTIVE BOX
HPI: Interviewed via  [email or web address] [id number]. 57 year old Lithuanian speaking male with HTN presenting to [facility] this past weekend with complaints of several weeks of dizziness, vomiting and blurriness of vision in the left eye. He has seen his PCP for these symptoms which would intermittently resolve. He called EMS when symptoms worsened with multiple episodes of vomiting. 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 Pt for surgery next week. Consulted for hormone work up. Pt c/o daily headache in the left temporal region sometimes relieved by tylenol. Pt reports blurry vision to left eye. Pt denies N/V since admission. Denies increased thirst or urination. Denies weight gain/loss. Reports good appetite. Denies ED. No family hx of diabetes or thyroid. Pt reports 2-3 member of family have had cancer, but doesn't know what time. All have been treated. Dexamethasone initiated last night. Pt on MISS while on steroids. Last .    PMH & Surgical Hx:  BRAIN TUMOR  HTN (hypertension)  GERD (gastroesophageal reflux disease)  HTN (hypertension)  Pituitary adenoma  History of cholecystectomy    SH:  Smoking: Denies  Etoh: Denies  Recreational Drugs: Denies  Social Life: lives with friend; works as     Current Meds:  acetaminophen   Tablet 650 milliGRAM(s) Oral every 6 hours PRN  acetaminophen   Tablet. 650 milliGRAM(s) Oral every 6 hours PRN  dexamethasone  IVPB 4 milliGRAM(s) IV Intermittent every 6 hours  dexamethasone  IVPB   IV Intermittent   docusate sodium 100 milliGRAM(s) Oral three times a day PRN  enoxaparin Injectable 40 milliGRAM(s) SubCutaneous at bedtime  insulin lispro (HumaLOG) corrective regimen sliding scale   SubCutaneous Before meals and at bedtime  losartan 100 milliGRAM(s) Oral daily  pantoprazole    Tablet 40 milliGRAM(s) Oral daily  senna 2 Tablet(s) Oral at bedtime PRN    Allergies:  penicillin (Rash)    ROS:  Denies the following except as indicated.    General: weight loss/weight gain, decreased appetite, fatigue  Eyes: Blurry vision, double vision, visual changes  ENT: Throat pain, changes in voice,   CV: palpitations, SOB, CP, cough  GI: NVD, difficulty swallowing, abdominal pain  : polyuria, dysuria  Endo: abnormal menses, temperature intolerance, decreased libido  MSK: weakness, joint pain  Skin: rash, dryness, diaphoresis  Heme: Easy bruising,bleeding  Neuro: HA, dizziness, lightheadedness, numbness tingling  Psych: Anxiety, Depression    Vital Signs Last 24 Hrs  T(C): 36.6 (11 May 2018 13:01), Max: 37 (10 May 2018 17:31)  T(F): 97.8 (11 May 2018 13:01), Max: 98.6 (10 May 2018 17:31)  HR: 100 (11 May 2018 13:01) (69 - 100)  BP: 177/93 (11 May 2018 13:01) (121/79 - 177/93)  BP(mean): --  RR: 16 (11 May 2018 13:01) (16 - 17)  SpO2: 95% (11 May 2018 13:01) (95% - 98%)        Constitutional: wn/wd in NAD.   HEENT: NCAT, MMM, OP clear, EOMI, , no proptosis or lid retraction  Neck: no thyromegaly or palpable thyroid nodules   Respiratory: lungs CTAB.  Cardiovascular: regular rhythm, normal S1 and S2, no audible murmurs, no peripheral edema  GI: soft, NT/ND, no masses/HSM appreciated.  Neurology: no tremors, DTR 2+  Skin: no visible rashes/lesions  Psychiatric: AAO x 3, normal affect/mood.  Ext: radial pulses intact, DP pulses intact, extremities warm, no cyanosis, clubbing or edema.       LABS:                        17.4   5.4   )-----------( 217      ( 11 May 2018 06:45 )             50.1     05-11    141  |  101  |  11  ----------------------------<  150<H>  5.5<H>   |  28  |  1.07    Ca    10.0      11 May 2018 06:45  Phos  1.8     05-11  Mg     2.3     05-11      PT/INR - ( 11 May 2018 06:45 )   PT: 12.1 sec;   INR: 1.09     PTT - ( 11 May 2018 06:45 )  PTT:35.3 sec    Hemoglobin A1C, Whole Blood: 5.6 (05-11 @ 06:45)      POCT Blood Glucose.: 224 mg/dL (11 May 2018 11:37)  POCT Blood Glucose.: 114 mg/dL (11 May 2018 08:00)  POCT Blood Glucose.: 124 mg/dL (10 May 2018 21:44)      A/P:57y Male--WILL DISCUSS IN AFTERNOON ROUNDS    1.  DM  Please continue lantus       units at night / morning.  Please continue lispro      units before each meal.  Please continue lispro moderate / low dose sliding scale four times daily with meals and at bedtime    Pt's fingerstick glucose goal is     Will continue to monitor     For discharge, pt can continue    Pt can follow up at discharge with [facility] by calling  to make an appointment.   Will discuss case with     and update primary team
ENT Consult Note    HPI: 57 year old St [place] speaking male with HTN presenting to [facility] this past weekend with complaints of several weeks of dizziness, vomiting and blurriness of vision in the left eye. He has seen his PCP for these symptoms which would intermittently resolve. He called EMS when symptoms worsened with multiple episodes of vomiting. 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 Uses Aspirin daily as outpatient. Denies any hearing changes, bowel/bladder changes, extremity weakness, falls, seizures, syncope. Transferred to [facility] for definitive treatment.  ENT consulted for plan for endoscopic transphenoidal hypophysectomy with NSGY.    PE:  NAD  Breathing comfortably on RA  V1-3 intact  Right EOMI, Left CN6 palsy  Visual fields grossly intact to digits    Outside MRI and CT imaging uploaded
Patient is a 57y old  Male who presents with a chief complaint of Transfer from another facility with pituitary mass (10 May 2018 16:32)      HPI:  57 year old Kinyarwanda speaking male with HTN presenting to [facility] this past weekend with complaints of several weeks of dizziness, vomiting and blurriness of vision in the left eye. He has seen his PCP for these symptoms which would intermittently resolve. He called EMS when symptoms worsened with multiple episodes of vomiting. 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 Uses Aspirin daily as outpatient. Denies any hearing changes, bowel/bladder changes, extremity weakness, falls, seizures, syncope. Transferred to  for definitive treatment. (10 May 2018 16:32)      PAST MEDICAL & SURGICAL HISTORY:  HTN (hypertension)  History of cholecystectomy      FAMILY HISTORY:  No pertinent family history in first degree relatives      SOCIAL HISTORY:  Smoking Status: [ ] Current, [ ] Former, [ ] Never  Pack Years:    MEDICATIONS:  Pulmonary:    Antimicrobials:    Anticoagulants:    Onc:    GI/:  docusate sodium 100 milliGRAM(s) Oral three times a day PRN  pantoprazole    Tablet 40 milliGRAM(s) Oral daily  senna 2 Tablet(s) Oral at bedtime PRN    Endocrine:  dexamethasone     Tablet 4 milliGRAM(s) Oral every 6 hours  insulin lispro (HumaLOG) corrective regimen sliding scale   SubCutaneous Before meals and at bedtime    Cardiac:  amLODIPine   Tablet 5 milliGRAM(s) Oral daily  losartan 100 milliGRAM(s) Oral daily    Other Medications:  acetaminophen   Tablet 650 milliGRAM(s) Oral every 6 hours PRN  acetaminophen   Tablet. 650 milliGRAM(s) Oral every 6 hours PRN  mupirocin 2% Ointment 1 Application(s) Topical every 12 hours      Allergies    penicillin (Rash)    Intolerances        Vital Signs Last 24 Hrs  T(C): 37.2 (14 May 2018 20:25), Max: 37.2 (14 May 2018 20:25)  T(F): 98.9 (14 May 2018 20:25), Max: 98.9 (14 May 2018 20:25)  HR: 94 (14 May 2018 20:25) (62 - 94)  BP: 153/98 (14 May 2018 20:25) (153/98 - 178/92)  BP(mean): --  RR: 17 (14 May 2018 20:25) (16 - 18)  SpO2: 96% (14 May 2018 20:25) (96% - 98%)     @ 07: @ 07:00  --------------------------------------------------------  IN: 850 mL / OUT: 700 mL / NET: 150 mL     @ 07: @ 21:52  --------------------------------------------------------  IN: 720 mL / OUT: 1150 mL / NET: -430 mL          LABS:      CBC Full  -  ( 14 May 2018 07:37 )  WBC Count : 15.5 K/uL  Hemoglobin : 15.9 g/dL  Hematocrit : 47.2 %  Platelet Count - Automated : 233 K/uL  Mean Cell Volume : 91.7 fL  Mean Cell Hemoglobin : 30.9 pg  Mean Cell Hemoglobin Concentration : 33.7 g/dL  Auto Neutrophil # : x  Auto Lymphocyte # : x  Auto Monocyte # : x  Auto Eosinophil # : x  Auto Basophil # : x  Auto Neutrophil % : x  Auto Lymphocyte % : x  Auto Monocyte % : x  Auto Eosinophil % : x  Auto Basophil % : x        139  |  98  |  20  ----------------------------<  133<H>  4.5   |  31  |  0.96    Ca    9.5      14 May 2018 07:37            Urinalysis Basic - ( 13 May 2018 11:43 )    Color: Yellow / Appearance: Clear / S.015 / pH: x  Gluc: x / Ketone: NEGATIVE  / Bili: Negative / Urobili: 0.2 E.U./dL   Blood: x / Protein: NEGATIVE mg/dL / Nitrite: NEGATIVE   Leuk Esterase: NEGATIVE / RBC: < 5 /HPF / WBC < 5 /HPF   Sq Epi: x / Non Sq Epi: 0-5 /HPF / Bacteria: None /HPF        EKG RSR normal  CXR NAP  < from: CT Sinus Brain Lab No Cont (05.13.18 @ 12:50) >  EXAM:  CT SINUSES BRAIN LAB                          PROCEDURE DATE:  2018                     INTERPRETATION:  Technique: A subcentimeter axial acquisition was   obtained through the paranasal sinuses utilizing navigational protocol.    Axial, sagittal and coronal reformatted images were created.      Contrast: Not administered    Clinical Information: 57-year-old male with pituitary mass    Prior Studies: Imported maxillofacial CT 5/10/2018    Findings:    Please refer to report of concurrent MRI sella for description of soft   tissue findings.    Destructive mass demonstrates exophytic components in the bilateral   sphenoid sinuses with extension into the bilateral pterygopalatine fossa,   more extensive on the right side where there is also considerable   sclerosis of the pterygoid process. Extensive destruction of the   basisphenoid is noted and there is permeative lytic involvement of the   clivus and dorsum sella. Calcific densities noted within the central   portion of thelesion may reflect residual bone fragments, or intrinsic   calcific matrix. There is the dehiscence of the bilateral sphenoid sinus   roof posteriorly, with dehiscence of the most superior aspect of the left   carotid canal. The planum sphenoidale isintact anteriorly, as are the   cribriform plate and fovea ethmoidalis bilaterally. There is a relative   flattened configuration to the left anterior skull base, and there is   also medial deformity of the left lamina papyracea with extension of   orbital fat into the left ethmoid complex. With the exception of the   sphenoid sinus tumor, the paranasal sinuses are predominantly ventilated.   There is a mildly undulating course of the nasal septum.    IMPRESSION:    Navigational study in patient with destructive central skull base mass,   as above. Images and image data are available for surgical planning.    < end of copied text >          RADIOLOGY & ADDITIONAL STUDIES (The following images were personally reviewed):

## 2018-05-14 NOTE — CONSULT NOTE ADULT - ATTENDING COMMENTS
Patient seen and examined with house-staff during bedside rounds.  Resident note read, including vitals, physical findings, laboratory data, and radiological reports.   Revisions included below.  Direct personal management at bed side and extensive interpretation of the data.  Plan was outlined and discussed in details with the housestaff.  Decision making of high complexity  Action taken for acute disease activity to reflect the level of care provided:  - medication reconciliation  - review laboratory data  - Preoperative evaluation  risk assessment  adjusted antihypertensive meds

## 2018-05-14 NOTE — CONSULT NOTE ADULT - PROBLEM SELECTOR RECOMMENDATION 5
The patient's medical condition is optimized for surgery.  There is no contraindication for surgery.  There is no clinical evidence neither of angina, decompensated CHF, arrhthymias, nor valvular disease.   There is no limitation of exercise capacity.  MET is 6 .  ASA class is2 .  Koch cardiac risk factor is low .  DVT prophylaxis is indicated.  Pain control.  Early mobilization.  Avoid fluid overload.

## 2018-05-14 NOTE — PROGRESS NOTE ADULT - SUBJECTIVE AND OBJECTIVE BOX
Pre-Op Noter  Dx: Pituitary mass  Surgeons: Dr. Valenzuela, Dr. Wallvar    Labs                        15.9   15.5  )-----------( 233      ( 14 May 2018 07:37 )             47.2   05-14    139  |  98  |  20  ----------------------------<  133<H>  4.5   |  31  |  0.96    Ca    9.5      14 May 2018 07:37    Urinalysis Basic - ( 13 May 2018 11:43 )    Color: Yellow / Appearance: Clear / S.015 / pH: x  Gluc: x / Ketone: NEGATIVE  / Bili: Negative / Urobili: 0.2 E.U./dL   Blood: x / Protein: NEGATIVE mg/dL / Nitrite: NEGATIVE   Leuk Esterase: NEGATIVE / RBC: < 5 /HPF / WBC < 5 /HPF   Sq Epi: x / Non Sq Epi: 0-5 /HPF / Bacteria: None /HPF      Exam:  AA&Ox3, NAD, Syrian speaking only,  face symmetric, PERRL, EOMI b/l, L eye CN 6th palsy, tongue protr midline,  motor 5/5 x 4 extr,  sensation to LT grossly intact throughout,    - EKG = NSR  - CXR - clear lungs  - NPO, IVF at midnight  - T&S ordered, 2units pRBC on hold for OR  - Dr. Foreman - med consult for clearance - pending  - Ophtho consult called, awaiting recs  - [place] CT Sinus and MRI sella completed  - Endocr work-op pre-op completed at [facility]  - ENT consulted for OR assitance  - D/w Dr. Valenzuela Pre-Op Noter  Dx: Pituitary mass  Surgeons: Dr. Valenzuela, Dr. Wallvar    Labs                        15.9   15.5  )-----------( 233      ( 14 May 2018 07:37 )             47.2   05-14    139  |  98  |  20  ----------------------------<  133<H>  4.5   |  31  |  0.96    Ca    9.5      14 May 2018 07:37    Urinalysis Basic - ( 13 May 2018 11:43 )    Color: Yellow / Appearance: Clear / S.015 / pH: x  Gluc: x / Ketone: NEGATIVE  / Bili: Negative / Urobili: 0.2 E.U./dL   Blood: x / Protein: NEGATIVE mg/dL / Nitrite: NEGATIVE   Leuk Esterase: NEGATIVE / RBC: < 5 /HPF / WBC < 5 /HPF   Sq Epi: x / Non Sq Epi: 0-5 /HPF / Bacteria: None /HPF      Exam:  AA&Ox3, NAD, Guamanian speaking only,  face symmetric, PERRL, EOMI b/l, L eye CN 6th palsy, tongue protr midline,  motor 5/5 x 4 extr,  sensation to LT grossly intact throughout,    - EKG = pending/ordered  - CXR - clear lungs  - NPO, IVF at midnight  - T&S ordered, 2units pRBC on hold for OR  - Dr. Foreman - med consult for clearance - pending  - Ophtho consult - pt was evaluated pre-op, note is in the chart,  - [place] CT Sinus and MRI sella completed  - Endocr work-op pre-op completed at [facility], non-secreting tumor  - ENT consulted for OR assitance  - D/w Dr. Valenzuela Pre-Op Noter  Dx: Pituitary mass  Surgeons: Dr. Valenzuela, Dr. Wallvar    Labs                        15.9   15.5  )-----------( 233      ( 14 May 2018 07:37 )             47.2   05-14    139  |  98  |  20  ----------------------------<  133<H>  4.5   |  31  |  0.96    Ca    9.5      14 May 2018 07:37    Urinalysis Basic - ( 13 May 2018 11:43 )    Color: Yellow / Appearance: Clear / S.015 / pH: x  Gluc: x / Ketone: NEGATIVE  / Bili: Negative / Urobili: 0.2 E.U./dL   Blood: x / Protein: NEGATIVE mg/dL / Nitrite: NEGATIVE   Leuk Esterase: NEGATIVE / RBC: < 5 /HPF / WBC < 5 /HPF   Sq Epi: x / Non Sq Epi: 0-5 /HPF / Bacteria: None /HPF      Exam:  AA&Ox3, NAD, Sao Tomean speaking only,  face symmetric, PERRL, EOMI b/l, L eye 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 Valenzuela and the patient  - EKG = pending/ordered  - CXR - no acute infiltrates  - NPO, IVF at midnight  - T&S ordered, 2units pRBC on hold for OR  - Dr. Foreman - med consult for clearance - pending  - Ophtho consult - pt was evaluated pre-op, note is in the chart,  - [place] CT Sinus and MRI sella completed  - Endocr work-op pre-op completed at [facility], non-secreting tumor  - ENT consulted for OR assitance  - D/w Dr. Valenzuela

## 2018-05-14 NOTE — PROGRESS NOTE ADULT - SUBJECTIVE AND OBJECTIVE BOX
HPI:  57 year old Portuguese speaking male with HTN presenting to [facility] this past weekend with complaints of several weeks of dizziness, vomiting and blurriness of vision in the left eye. He has seen his PCP for these symptoms which would intermittently resolve. He called EMS when symptoms worsened with multiple episodes of vomiting. 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 Uses Aspirin daily as outpatient. Denies any hearing changes, bowel/bladder changes, extremity weakness, falls, seizures, syncope. Transferred to [facility] for definitive treatment. (10 May 2018 16:32)    OVERNIGHT EVENTS:  Vital Signs Last 24 Hrs  T(C): 36.6 (14 May 2018 06:51), Max: 36.7 (13 May 2018 17:14)  T(F): 97.9 (14 May 2018 06:51), Max: 98 (13 May 2018 17:14)  HR: 62 (14 May 2018 06:51) (62 - 80)  BP: 155/98 (14 May 2018 06:51) (135/83 - 178/92)  BP(mean): --  RR: 16 (14 May 2018 06:51) (16 - 17)  SpO2: 97% (14 May 2018 06:51) (95% - 98%)    I&O's Summary    13 May 2018 07:01  -  14 May 2018 07:00  --------------------------------------------------------  IN: 850 mL / OUT: 700 mL / NET: 150 mL        PHYSICAL EXAM:    Neurological:  AxOx3  L decreased VA  VF grossly intact  L CN VI palsy  no pronator drift  5/5 motor x4ext    Cardiovascular: RRR  Respiratory: Lungs CTAB  Gastrointestinal: +BS  Genitourinary: Voiding without difficulty  Extremities: warm and dry      DIET:  Regular      LABS:                        15.7   20.2  )-----------( 252      ( 13 May 2018 07:34 )             46.4     05-13    141  |  100  |  20  ----------------------------<  126<H>  4.7   |  30  |  1.02    Ca    9.4      13 May 2018 07:34  Phos  4.4     05-12  Mg     2.2     05-12        Urinalysis Basic - ( 13 May 2018 11:43 )    Color: Yellow / Appearance: Clear / S.015 / pH: x  Gluc: x / Ketone: NEGATIVE  / Bili: Negative / Urobili: 0.2 E.U./dL   Blood: x / Protein: NEGATIVE mg/dL / Nitrite: NEGATIVE   Leuk Esterase: NEGATIVE / RBC: < 5 /HPF / WBC < 5 /HPF   Sq Epi: x / Non Sq Epi: 0-5 /HPF / Bacteria: None /HPF          CAPILLARY BLOOD GLUCOSE      POCT Blood Glucose.: 122 mg/dL (14 May 2018 07:24)  POCT Blood Glucose.: 217 mg/dL (13 May 2018 21:05)  POCT Blood Glucose.: 202 mg/dL (13 May 2018 17:01)  POCT Blood Glucose.: 164 mg/dL (13 May 2018 11:29)      Drug Levels: [] N/A    CSF Analysis: [] N/A      Allergies    penicillin (Rash)    Intolerances      MEDICATIONS:  Antibiotics:    Neuro:  acetaminophen   Tablet 650 milliGRAM(s) Oral every 6 hours PRN  acetaminophen   Tablet. 650 milliGRAM(s) Oral every 6 hours PRN    Anticoagulation:  enoxaparin Injectable 40 milliGRAM(s) SubCutaneous at bedtime    OTHER:  dexamethasone     Tablet 4 milliGRAM(s) Oral every 6 hours  docusate sodium 100 milliGRAM(s) Oral three times a day PRN  insulin lispro (HumaLOG) corrective regimen sliding scale   SubCutaneous Before meals and at bedtime  losartan 100 milliGRAM(s) Oral daily  pantoprazole    Tablet 40 milliGRAM(s) Oral daily  senna 2 Tablet(s) Oral at bedtime PRN    IVF:    CULTURES:    RADIOLOGY & ADDITIONAL TESTS:      ASSESSMENT:  57y Male  Pituitary mass    PLAN:    NEURO:    Monitor neuro status  OT/PT  Preop for OR in AM  Continue current medical regime    Dispo: Rajan discuss with attending

## 2018-05-15 LAB
APTT BLD: 26.2 SEC — LOW (ref 27.5–37.4)
INR BLD: 1.09 — SIGNIFICANT CHANGE UP (ref 0.88–1.16)
PROTHROM AB SERPL-ACNC: 12.1 SEC — SIGNIFICANT CHANGE UP (ref 9.8–12.7)

## 2018-05-15 RX ADMIN — MUPIROCIN 1 APPLICATION(S): 20 OINTMENT TOPICAL at 06:42

## 2018-05-15 RX ADMIN — Medication 4 MILLIGRAM(S): at 12:12

## 2018-05-15 RX ADMIN — SODIUM CHLORIDE 70 MILLILITER(S): 9 INJECTION INTRAMUSCULAR; INTRAVENOUS; SUBCUTANEOUS at 00:01

## 2018-05-15 RX ADMIN — Medication 4 MILLIGRAM(S): at 18:29

## 2018-05-15 RX ADMIN — PANTOPRAZOLE SODIUM 40 MILLIGRAM(S): 20 TABLET, DELAYED RELEASE ORAL at 12:12

## 2018-05-15 RX ADMIN — MUPIROCIN 1 APPLICATION(S): 20 OINTMENT TOPICAL at 18:29

## 2018-05-15 RX ADMIN — Medication 6: at 21:40

## 2018-05-15 RX ADMIN — LOSARTAN POTASSIUM 100 MILLIGRAM(S): 100 TABLET, FILM COATED ORAL at 06:42

## 2018-05-15 RX ADMIN — Medication 6: at 16:44

## 2018-05-15 RX ADMIN — Medication 4 MILLIGRAM(S): at 06:42

## 2018-05-15 RX ADMIN — AMLODIPINE BESYLATE 5 MILLIGRAM(S): 2.5 TABLET ORAL at 06:42

## 2018-05-15 NOTE — PROGRESS NOTE ADULT - SUBJECTIVE AND OBJECTIVE BOX
HPI:  57 year old Korean speaking male with HTN presenting to [facility] this past weekend with complaints of several weeks of dizziness, vomiting and blurriness of vision in the left eye. He has seen his PCP for these symptoms which would intermittently resolve. He called EMS when symptoms worsened with multiple episodes of vomiting. 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 Uses Aspirin daily as outpatient. Denies any hearing changes, bowel/bladder changes, extremity weakness, falls, seizures, syncope. Transferred to [facility] for definitive treatment. (10 May 2018 16:32)    OVERNIGHT EVENTS:  Vital Signs Last 24 Hrs  T(C): 37 (15 May 2018 14:01), Max: 37.2 (14 May 2018 20:25)  T(F): 98.6 (15 May 2018 14:01), Max: 98.9 (14 May 2018 20:25)  HR: 79 (15 May 2018 14:01) (72 - 94)  BP: 137/78 (15 May 2018 14:01) (137/78 - 154/91)  BP(mean): --  RR: 17 (15 May 2018 14:01) (17 - 17)  SpO2: 97% (15 May 2018 14:01) (95% - 97%)    I&O's Summary    14 May 2018 07:01  -  15 May 2018 07:00  --------------------------------------------------------  IN: 1350 mL / OUT: 2150 mL / NET: -800 mL    15 May 2018 07:01  -  15 May 2018 16:17  --------------------------------------------------------  IN: 630 mL / OUT: 1400 mL / NET: -770 mL        PHYSICAL EXAM:  Neurological:  AxOx3  L decreased VA  VF grossly intact  L CN VI palsy  no pronator drift  5/5 motor x4ext    Cardiovascular:RRR  Respiratory:Lungs CTAB  Gastrointestinal:+BS  Genitourinary: voiding without difficulty  Extremities: warm and dry    DIET: Regular    LABS:                        15.9   15.5  )-----------( 233      ( 14 May 2018 07:37 )             47.2     05-14    139  |  98  |  20  ----------------------------<  133<H>  4.5   |  31  |  0.96    Ca    9.5      14 May 2018 07:37      PT/INR - ( 15 May 2018 07:22 )   PT: 12.1 sec;   INR: 1.09          PTT - ( 15 May 2018 07:22 )  PTT:26.2 sec        CAPILLARY BLOOD GLUCOSE      POCT Blood Glucose.: 119 mg/dL (15 May 2018 11:17)  POCT Blood Glucose.: 122 mg/dL (15 May 2018 07:26)  POCT Blood Glucose.: 132 mg/dL (14 May 2018 21:22)      Drug Levels: [] N/A    CSF Analysis: [] N/A      Allergies    penicillin (Rash)    Intolerances      MEDICATIONS:  Antibiotics:    Neuro:  acetaminophen   Tablet 650 milliGRAM(s) Oral every 6 hours PRN  acetaminophen   Tablet. 650 milliGRAM(s) Oral every 6 hours PRN    Anticoagulation:    OTHER:  amLODIPine   Tablet 5 milliGRAM(s) Oral daily  dexamethasone     Tablet 4 milliGRAM(s) Oral every 6 hours  docusate sodium 100 milliGRAM(s) Oral three times a day PRN  insulin lispro (HumaLOG) corrective regimen sliding scale   SubCutaneous Before meals and at bedtime  losartan 100 milliGRAM(s) Oral daily  mupirocin 2% Ointment 1 Application(s) Topical every 12 hours  pantoprazole    Tablet 40 milliGRAM(s) Oral daily  senna 2 Tablet(s) Oral at bedtime PRN    IVF:  sodium chloride 0.9%. 1000 milliLiter(s) IV Continuous <Continuous>    CULTURES:    RADIOLOGY & ADDITIONAL TESTS:      ASSESSMENT:  57y Male Pituitary mass    PLAN:  NEURO:    Monitor neuro status  Resume diet  continue current medical regime  Preop for OR in Am  Attending spoke to patient and family and they understood

## 2018-05-16 DIAGNOSIS — Z98.890 OTHER SPECIFIED POSTPROCEDURAL STATES: ICD-10-CM

## 2018-05-16 LAB
ANION GAP SERPL CALC-SCNC: 10 MMOL/L — SIGNIFICANT CHANGE UP (ref 5–17)
BUN SERPL-MCNC: 24 MG/DL — HIGH (ref 7–23)
CALCIUM SERPL-MCNC: 9.5 MG/DL — SIGNIFICANT CHANGE UP (ref 8.4–10.5)
CHLORIDE SERPL-SCNC: 97 MMOL/L — SIGNIFICANT CHANGE UP (ref 96–108)
CO2 SERPL-SCNC: 31 MMOL/L — SIGNIFICANT CHANGE UP (ref 22–31)
CREAT SERPL-MCNC: 0.99 MG/DL — SIGNIFICANT CHANGE UP (ref 0.5–1.3)
GLUCOSE SERPL-MCNC: 149 MG/DL — HIGH (ref 70–99)
HCT VFR BLD CALC: 48.4 % — SIGNIFICANT CHANGE UP (ref 39–50)
HGB BLD-MCNC: 16.7 G/DL — SIGNIFICANT CHANGE UP (ref 13–17)
MAGNESIUM SERPL-MCNC: 2.6 MG/DL — SIGNIFICANT CHANGE UP (ref 1.6–2.6)
MCHC RBC-ENTMCNC: 31.2 PG — SIGNIFICANT CHANGE UP (ref 27–34)
MCHC RBC-ENTMCNC: 34.5 G/DL — SIGNIFICANT CHANGE UP (ref 32–36)
MCV RBC AUTO: 90.5 FL — SIGNIFICANT CHANGE UP (ref 80–100)
PHOSPHATE SERPL-MCNC: 3.7 MG/DL — SIGNIFICANT CHANGE UP (ref 2.5–4.5)
PLATELET # BLD AUTO: 269 K/UL — SIGNIFICANT CHANGE UP (ref 150–400)
POTASSIUM SERPL-MCNC: 4.8 MMOL/L — SIGNIFICANT CHANGE UP (ref 3.5–5.3)
POTASSIUM SERPL-SCNC: 4.8 MMOL/L — SIGNIFICANT CHANGE UP (ref 3.5–5.3)
RBC # BLD: 5.35 M/UL — SIGNIFICANT CHANGE UP (ref 4.2–5.8)
RBC # FLD: 13.1 % — SIGNIFICANT CHANGE UP (ref 10.3–16.9)
SODIUM SERPL-SCNC: 138 MMOL/L — SIGNIFICANT CHANGE UP (ref 135–145)
WBC # BLD: 14.9 K/UL — HIGH (ref 3.8–10.5)
WBC # FLD AUTO: 14.9 K/UL — HIGH (ref 3.8–10.5)

## 2018-05-16 PROCEDURE — 99233 SBSQ HOSP IP/OBS HIGH 50: CPT | Mod: GC

## 2018-05-16 RX ORDER — INSULIN LISPRO 100/ML
6 VIAL (ML) SUBCUTANEOUS
Qty: 0 | Refills: 0 | Status: DISCONTINUED | OUTPATIENT
Start: 2018-05-16 | End: 2018-05-17

## 2018-05-16 RX ORDER — SODIUM CHLORIDE 9 MG/ML
1000 INJECTION INTRAMUSCULAR; INTRAVENOUS; SUBCUTANEOUS
Qty: 0 | Refills: 0 | Status: DISCONTINUED | OUTPATIENT
Start: 2018-05-17 | End: 2018-05-17

## 2018-05-16 RX ADMIN — Medication 4 MILLIGRAM(S): at 23:03

## 2018-05-16 RX ADMIN — Medication 2: at 07:54

## 2018-05-16 RX ADMIN — AMLODIPINE BESYLATE 5 MILLIGRAM(S): 2.5 TABLET ORAL at 05:12

## 2018-05-16 RX ADMIN — PANTOPRAZOLE SODIUM 40 MILLIGRAM(S): 20 TABLET, DELAYED RELEASE ORAL at 11:40

## 2018-05-16 RX ADMIN — Medication 2: at 22:15

## 2018-05-16 RX ADMIN — Medication 4 MILLIGRAM(S): at 11:40

## 2018-05-16 RX ADMIN — Medication 4: at 17:10

## 2018-05-16 RX ADMIN — Medication 4 MILLIGRAM(S): at 17:10

## 2018-05-16 RX ADMIN — LOSARTAN POTASSIUM 100 MILLIGRAM(S): 100 TABLET, FILM COATED ORAL at 05:12

## 2018-05-16 RX ADMIN — MUPIROCIN 1 APPLICATION(S): 20 OINTMENT TOPICAL at 05:12

## 2018-05-16 RX ADMIN — Medication 4 MILLIGRAM(S): at 00:31

## 2018-05-16 RX ADMIN — Medication 4 MILLIGRAM(S): at 05:12

## 2018-05-16 RX ADMIN — Medication 2: at 11:42

## 2018-05-16 RX ADMIN — MUPIROCIN 1 APPLICATION(S): 20 OINTMENT TOPICAL at 17:11

## 2018-05-16 RX ADMIN — Medication 6 UNIT(S): at 22:15

## 2018-05-16 NOTE — PROGRESS NOTE ADULT - ATTENDING COMMENTS
Patient seen and examined with house-staff during bedside rounds.  Resident note read, including vitals, physical findings, laboratory data, and radiological reports.   Revisions included below.  Direct personal management at bed side and extensive interpretation of the data.  Plan was outlined and discussed in details with the housestaff.  Decision making of high complexity  Action taken for acute disease activity to reflect the level of care provided:  - medication reconciliation  - review laboratory data    adjusted antihypertensive meds  Started pre-meals insulin

## 2018-05-16 NOTE — PROGRESS NOTE ADULT - PROBLEM SELECTOR PLAN 5
The patient is hemodynamically stable.  The pain is controlled.  Patient is on DVT prophylaxis.  Patient is using incentive spirometry.  Oxygen saturation is acceptable.  Advance diet as tolerated.  Advance activity as tolerated.  Monitor for ileus.  Patient is on Laxatives.  Surgical wound is stable.  No indication for monitor bed.

## 2018-05-16 NOTE — PROGRESS NOTE ADULT - SUBJECTIVE AND OBJECTIVE BOX
Interval Events: Reviewed  Patient seen and examined at bedside.    Patient is a 57y old  Male who presents with a chief complaint of Transfer from another facility with pituitary mass (10 May 2018 16:32)    He is doing well and the pain is controlled. He is walking.  PAST MEDICAL & SURGICAL HISTORY:  HTN (hypertension)  History of cholecystectomy      MEDICATIONS:  Pulmonary:    Antimicrobials:    Anticoagulants:    Cardiac:  amLODIPine   Tablet 5 milliGRAM(s) Oral daily  losartan 100 milliGRAM(s) Oral daily      Allergies    penicillin (Rash)    Intolerances        Vital Signs Last 24 Hrs  T(C): 36.6 (16 May 2018 16:57), Max: 36.8 (16 May 2018 14:52)  T(F): 97.8 (16 May 2018 16:57), Max: 98.3 (16 May 2018 14:52)  HR: 72 (16 May 2018 16:57) (67 - 79)  BP: 133/84 (16 May 2018 16:57) (133/84 - 142/88)  BP(mean): --  RR: 17 (16 May 2018 16:57) (16 - 18)  SpO2: 95% (16 May 2018 16:57) (95% - 98%)    05-15 @ 07:01  -  05-16 @ 07:00  --------------------------------------------------------  IN: 1410 mL / OUT: 1400 mL / NET: 10 mL    05-16 @ 07:01 - 05-16 @ 20:38  --------------------------------------------------------  IN: 1280 mL / OUT: 400 mL / NET: 880 mL          LABS:      CBC Full  -  ( 16 May 2018 06:50 )  WBC Count : 14.9 K/uL  Hemoglobin : 16.7 g/dL  Hematocrit : 48.4 %  Platelet Count - Automated : 269 K/uL  Mean Cell Volume : 90.5 fL  Mean Cell Hemoglobin : 31.2 pg  Mean Cell Hemoglobin Concentration : 34.5 g/dL  Auto Neutrophil # : x  Auto Lymphocyte # : x  Auto Monocyte # : x  Auto Eosinophil # : x  Auto Basophil # : x  Auto Neutrophil % : x  Auto Lymphocyte % : x  Auto Monocyte % : x  Auto Eosinophil % : x  Auto Basophil % : x    05-16    138  |  97  |  24<H>  ----------------------------<  149<H>  4.8   |  31  |  0.99    Ca    9.5      16 May 2018 06:50  Phos  3.7     05-16  Mg     2.6     05-16      PT/INR - ( 15 May 2018 07:22 )   PT: 12.1 sec;   INR: 1.09          PTT - ( 15 May 2018 07:22 )  PTT:26.2 sec                    RADIOLOGY & ADDITIONAL STUDIES (The following images were personally reviewed):  Irby:                                     No  Urine output:                       adequate  DVT prophylaxis:                 Yes  Flattus:                                  Yes  Bowel movement:              No

## 2018-05-16 NOTE — PROGRESS NOTE ADULT - SUBJECTIVE AND OBJECTIVE BOX
HPI:  57 year old Arabic speaking male with HTN presenting to [facility] this past weekend with complaints of several weeks of dizziness, vomiting and blurriness of vision in the left eye. He has seen his PCP for these symptoms which would intermittently resolve. He called EMS when symptoms worsened with multiple episodes of vomiting. 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 Uses Aspirin daily as outpatient. Denies any hearing changes, bowel/bladder changes, extremity weakness, falls, seizures, syncope. Transferred to [facility] for definitive treatment. (10 May 2018 16:32)    OVERNIGHT EVENTS:  OR cancelled this morning due to scheduling difficulty. Will be rescheduled for tomorrow. Pt aware and agreeable. No new complaints.    Vital Signs Last 24 Hrs  T(C): 36.2 (16 May 2018 04:42), Max: 37.2 (15 May 2018 17:09)  T(F): 97.2 (16 May 2018 04:42), Max: 98.9 (15 May 2018 17:09)  HR: 67 (16 May 2018 04:42) (67 - 98)  BP: 137/85 (16 May 2018 04:42) (133/84 - 137/85)  BP(mean): --  RR: 18 (16 May 2018 04:42) (17 - 18)  SpO2: 98% (16 May 2018 04:42) (95% - 98%)    I&O's Summary    15 May 2018 07:01  -  16 May 2018 07:00  --------------------------------------------------------  IN: 1410 mL / OUT: 1400 mL / NET: 10 mL        PHYSICAL EXAM:  Neurological:  AxOx3  L decreased VA  VF grossly intact  L CN VI palsy  no pronator drift  5/5 motor x4ext      TUBES/LINES:  [] Irby  [] Lumbar Drain  [] Wound Drains  [] Others      DIET:  [] NPO  [x] Mechanical  [] Tube feeds    LABS:                        16.7   14.9  )-----------( 269      ( 16 May 2018 06:50 )             48.4     05-16    138  |  97  |  24<H>  ----------------------------<  149<H>  4.8   |  31  |  0.99    Ca    9.5      16 May 2018 06:50  Phos  3.7     05-16  Mg     2.6     05-16      PT/INR - ( 15 May 2018 07:22 )   PT: 12.1 sec;   INR: 1.09          PTT - ( 15 May 2018 07:22 )  PTT:26.2 sec        CAPILLARY BLOOD GLUCOSE      POCT Blood Glucose.: 157 mg/dL (16 May 2018 07:44)  POCT Blood Glucose.: 293 mg/dL (15 May 2018 21:19)  POCT Blood Glucose.: 271 mg/dL (15 May 2018 16:36)  POCT Blood Glucose.: 119 mg/dL (15 May 2018 11:17)      Drug Levels: [] N/A    CSF Analysis: [] N/A      Allergies    penicillin (Rash)    Intolerances      MEDICATIONS:  Antibiotics:    Neuro:  acetaminophen   Tablet 650 milliGRAM(s) Oral every 6 hours PRN  acetaminophen   Tablet. 650 milliGRAM(s) Oral every 6 hours PRN    Anticoagulation:    OTHER:  amLODIPine   Tablet 5 milliGRAM(s) Oral daily  dexamethasone     Tablet 4 milliGRAM(s) Oral every 6 hours  docusate sodium 100 milliGRAM(s) Oral three times a day PRN  insulin lispro (HumaLOG) corrective regimen sliding scale   SubCutaneous Before meals and at bedtime  losartan 100 milliGRAM(s) Oral daily  mupirocin 2% Ointment 1 Application(s) Topical every 12 hours  pantoprazole    Tablet 40 milliGRAM(s) Oral daily  senna 2 Tablet(s) Oral at bedtime PRN    IVF:    CULTURES:    RADIOLOGY & ADDITIONAL TESTS:  < from: MR Sella only w/ IV Cont (05.13.18 @ 14:01) >  Destructive central skull base demonstrates components within the clivus,   the bilateral sphenoid sinuses and the bilateral pterygopalatine fossae.   The sella floor appears intact on concurrent CT imaging, suggesting extra   pituitary origin. In addition to primary sinonasal malignancy, lesions   primary to the clivus are considered, as above.    < end of copied text >      ASSESSMENT:  57y Male w/ pituitary mass    BRAIN TUMOR  No h/o HF  No pertinent family history in first degree relatives  Handoff  MEWS Score  HTN (hypertension)  Preoperative clearance  Elevated blood sugar  GERD (gastroesophageal reflux disease)  HTN (hypertension)  Pituitary adenoma  History of cholecystectomy      PLAN:  NEURO:  -con't decadron 4q6  -neurochecks  -bactorban for + MRSA nasal swab  -d/c lovenox for OR tomorrow  -D/W     DVT PROPHYLAXIS:  [x] Venodynes                                [] Heparin/Lovenox    DISPOSITION:pending OR

## 2018-05-16 NOTE — DIETITIAN INITIAL EVALUATION ADULT. - OTHER INFO
58 yo M admitted w/ pituitary mass. now s/p endoscopic pituitary mass resection. Pt seen resting in bed. Reporst adequate appetite, consuming most of meals. Denies N/V. Last BM yesterday. NKFA or dietary restrictions. No known wt changes. Skin: surgical incision; GI WDL per flowsheet.

## 2018-05-16 NOTE — PROGRESS NOTE ADULT - SUBJECTIVE AND OBJECTIVE BOX
Surgery: endoscopic pituitary mass resection  Consent: Signed by patient   bob (Penny)        T(C): 36.2 (05-16-18 @ 04:42), Max: 37.2 (05-15-18 @ 17:09)  HR: 67 (05-16-18 @ 04:42) (67 - 98)  BP: 137/85 (05-16-18 @ 04:42) (133/84 - 137/85)  RR: 18 (05-16-18 @ 04:42) (17 - 18)  SpO2: 98% (05-16-18 @ 04:42) (95% - 98%)  Wt(kg): --          05-16    138  |  97  |  24<H>  ----------------------------<  149<H>  4.8   |  31  |  0.99    Ca    9.5      16 May 2018 06:50  Phos  3.7     05-16  Mg     2.6     05-16      CBC Full  -  ( 16 May 2018 06:50 )  WBC Count : 14.9 K/uL  Hemoglobin : 16.7 g/dL  Hematocrit : 48.4 %  Platelet Count - Automated : 269 K/uL  Mean Cell Volume : 90.5 fL  Mean Cell Hemoglobin : 31.2 pg  Mean Cell Hemoglobin Concentration : 34.5 g/dL  Auto Neutrophil # : x  Auto Lymphocyte # : x  Auto Monocyte # : x  Auto Eosinophil # : x  Auto Basophil # : x  Auto Neutrophil % : x  Auto Lymphocyte % : x  Auto Monocyte % : x  Auto Eosinophil % : x  Auto Basophil % : x    PT/INR - ( 15 May 2018 07:22 )   PT: 12.1 sec;   INR: 1.09          PTT - ( 15 May 2018 07:22 )  PTT:26.2 sec    Pregnancy test:n/a  Type & Screen (in past 72hrs):sent to   MRSA nasal swab:+  CXR: clear lungs 5/11  EKG:< from: 12 Lead ECG (05.14.18 @ 15:54) >  Unusual P axis and short CO, probable junctional rhythm  Right superior axis deviation  Inferior infarct , age undetermined    < end of copied text >    ECHO:  Medical Clearances:  Other Clearances:      Last dose of antiplatelet/anticoagulation drug: lovenox 5/14    Implanted Devices (pacemaker, drug pump...etc):  []YES   [x] NO                  If yes --> EPS consulted to interrogate/adjust device:                 Assessment: 56yo male with pituitary mass     Plan:  -IAN singh [place]  -navigational studies done  -con't decadron  -D/W

## 2018-05-16 NOTE — DIETITIAN INITIAL EVALUATION ADULT. - ENERGY NEEDS
Height: 5'10" Weight: 235lbs, IBW 166lbs+/-10%, %%, BMI 33.7  IBW used for calculations as pt >120% of IBW   Nutrient needs based on [facility] standards of care for maintenance in adults.   Needs adjusted for post-op

## 2018-05-17 ENCOUNTER — RESULT REVIEW (OUTPATIENT)
Age: 58
End: 2018-05-17

## 2018-05-17 ENCOUNTER — APPOINTMENT (OUTPATIENT)
Dept: OTOLARYNGOLOGY | Facility: HOSPITAL | Age: 58
End: 2018-05-17

## 2018-05-17 PROCEDURE — 61781 SCAN PROC CRANIAL INTRA: CPT | Mod: 80

## 2018-05-17 PROCEDURE — 61782 SCAN PROC CRANIAL EXTRA: CPT

## 2018-05-17 PROCEDURE — 62165 REMOVE PITUIT TUMOR W/SCOPE: CPT | Mod: 80

## 2018-05-17 PROCEDURE — 99233 SBSQ HOSP IP/OBS HIGH 50: CPT | Mod: 24

## 2018-05-17 PROCEDURE — 62165 REMOVE PITUIT TUMOR W/SCOPE: CPT | Mod: 62

## 2018-05-17 RX ORDER — PANTOPRAZOLE SODIUM 20 MG/1
40 TABLET, DELAYED RELEASE ORAL DAILY
Qty: 0 | Refills: 0 | Status: DISCONTINUED | OUTPATIENT
Start: 2018-05-17 | End: 2018-05-18

## 2018-05-17 RX ORDER — INSULIN LISPRO 100/ML
VIAL (ML) SUBCUTANEOUS
Qty: 0 | Refills: 0 | Status: DISCONTINUED | OUTPATIENT
Start: 2018-05-17 | End: 2018-05-18

## 2018-05-17 RX ORDER — SENNA PLUS 8.6 MG/1
2 TABLET ORAL AT BEDTIME
Qty: 0 | Refills: 0 | Status: DISCONTINUED | OUTPATIENT
Start: 2018-05-17 | End: 2018-05-18

## 2018-05-17 RX ORDER — INSULIN LISPRO 100/ML
6 VIAL (ML) SUBCUTANEOUS
Qty: 0 | Refills: 0 | Status: DISCONTINUED | OUTPATIENT
Start: 2018-05-17 | End: 2018-05-18

## 2018-05-17 RX ORDER — DEXAMETHASONE 0.5 MG/5ML
2 ELIXIR ORAL EVERY 12 HOURS
Qty: 0 | Refills: 0 | Status: CANCELLED | OUTPATIENT
Start: 2018-05-21 | End: 2018-05-18

## 2018-05-17 RX ORDER — POLYETHYLENE GLYCOL 3350 17 G/17G
17 POWDER, FOR SOLUTION ORAL DAILY
Qty: 0 | Refills: 0 | Status: DISCONTINUED | OUTPATIENT
Start: 2018-05-17 | End: 2018-05-18

## 2018-05-17 RX ORDER — SODIUM CHLORIDE 9 MG/ML
1000 INJECTION INTRAMUSCULAR; INTRAVENOUS; SUBCUTANEOUS
Qty: 0 | Refills: 0 | Status: DISCONTINUED | OUTPATIENT
Start: 2018-05-17 | End: 2018-05-17

## 2018-05-17 RX ORDER — ACETAMINOPHEN 500 MG
650 TABLET ORAL EVERY 6 HOURS
Qty: 0 | Refills: 0 | Status: DISCONTINUED | OUTPATIENT
Start: 2018-05-17 | End: 2018-05-18

## 2018-05-17 RX ORDER — DEXAMETHASONE 0.5 MG/5ML
ELIXIR ORAL
Qty: 0 | Refills: 0 | Status: DISCONTINUED | OUTPATIENT
Start: 2018-05-17 | End: 2018-05-18

## 2018-05-17 RX ORDER — DEXAMETHASONE 0.5 MG/5ML
4 ELIXIR ORAL EVERY 6 HOURS
Qty: 0 | Refills: 0 | Status: COMPLETED | OUTPATIENT
Start: 2018-05-17 | End: 2018-05-18

## 2018-05-17 RX ORDER — MUPIROCIN 20 MG/G
1 OINTMENT TOPICAL EVERY 12 HOURS
Qty: 0 | Refills: 0 | Status: DISCONTINUED | OUTPATIENT
Start: 2018-05-17 | End: 2018-05-18

## 2018-05-17 RX ORDER — OXYCODONE HYDROCHLORIDE 5 MG/1
5 TABLET ORAL EVERY 4 HOURS
Qty: 0 | Refills: 0 | Status: DISCONTINUED | OUTPATIENT
Start: 2018-05-17 | End: 2018-05-18

## 2018-05-17 RX ORDER — ACETAMINOPHEN 500 MG
1000 TABLET ORAL ONCE
Qty: 0 | Refills: 0 | Status: DISCONTINUED | OUTPATIENT
Start: 2018-05-17 | End: 2018-05-17

## 2018-05-17 RX ORDER — SODIUM CHLORIDE 9 MG/ML
1000 INJECTION INTRAMUSCULAR; INTRAVENOUS; SUBCUTANEOUS
Qty: 0 | Refills: 0 | Status: DISCONTINUED | OUTPATIENT
Start: 2018-05-17 | End: 2018-05-18

## 2018-05-17 RX ORDER — LOSARTAN POTASSIUM 100 MG/1
100 TABLET, FILM COATED ORAL DAILY
Qty: 0 | Refills: 0 | Status: DISCONTINUED | OUTPATIENT
Start: 2018-05-17 | End: 2018-05-18

## 2018-05-17 RX ORDER — DEXAMETHASONE 0.5 MG/5ML
4 ELIXIR ORAL EVERY 6 HOURS
Qty: 0 | Refills: 0 | Status: DISCONTINUED | OUTPATIENT
Start: 2018-05-17 | End: 2018-05-17

## 2018-05-17 RX ORDER — DEXAMETHASONE 0.5 MG/5ML
2 ELIXIR ORAL EVERY 24 HOURS
Qty: 0 | Refills: 0 | Status: CANCELLED | OUTPATIENT
Start: 2018-05-22 | End: 2018-05-18

## 2018-05-17 RX ORDER — SODIUM CHLORIDE 0.65 %
1 AEROSOL, SPRAY (ML) NASAL
Qty: 0 | Refills: 0 | Status: DISCONTINUED | OUTPATIENT
Start: 2018-05-17 | End: 2018-05-18

## 2018-05-17 RX ORDER — DEXAMETHASONE 0.5 MG/5ML
1 ELIXIR ORAL EVERY 24 HOURS
Qty: 0 | Refills: 0 | Status: CANCELLED | OUTPATIENT
Start: 2018-05-23 | End: 2018-05-18

## 2018-05-17 RX ORDER — AMLODIPINE BESYLATE 2.5 MG/1
5 TABLET ORAL DAILY
Qty: 0 | Refills: 0 | Status: DISCONTINUED | OUTPATIENT
Start: 2018-05-17 | End: 2018-05-18

## 2018-05-17 RX ORDER — DEXAMETHASONE 0.5 MG/5ML
2 ELIXIR ORAL EVERY 8 HOURS
Qty: 0 | Refills: 0 | Status: DISCONTINUED | OUTPATIENT
Start: 2018-05-19 | End: 2018-05-18

## 2018-05-17 RX ORDER — DOCUSATE SODIUM 100 MG
100 CAPSULE ORAL THREE TIMES A DAY
Qty: 0 | Refills: 0 | Status: DISCONTINUED | OUTPATIENT
Start: 2018-05-17 | End: 2018-05-18

## 2018-05-17 RX ORDER — DEXAMETHASONE 0.5 MG/5ML
4 ELIXIR ORAL EVERY 8 HOURS
Qty: 0 | Refills: 0 | Status: DISCONTINUED | OUTPATIENT
Start: 2018-05-18 | End: 2018-05-18

## 2018-05-17 RX ADMIN — Medication 4 MILLIGRAM(S): at 05:43

## 2018-05-17 RX ADMIN — MUPIROCIN 1 APPLICATION(S): 20 OINTMENT TOPICAL at 23:21

## 2018-05-17 RX ADMIN — AMLODIPINE BESYLATE 5 MILLIGRAM(S): 2.5 TABLET ORAL at 21:27

## 2018-05-17 RX ADMIN — LOSARTAN POTASSIUM 100 MILLIGRAM(S): 100 TABLET, FILM COATED ORAL at 05:42

## 2018-05-17 RX ADMIN — PANTOPRAZOLE SODIUM 40 MILLIGRAM(S): 20 TABLET, DELAYED RELEASE ORAL at 21:26

## 2018-05-17 RX ADMIN — LOSARTAN POTASSIUM 100 MILLIGRAM(S): 100 TABLET, FILM COATED ORAL at 21:27

## 2018-05-17 RX ADMIN — MUPIROCIN 1 APPLICATION(S): 20 OINTMENT TOPICAL at 05:43

## 2018-05-17 RX ADMIN — Medication 4 MILLIGRAM(S): at 21:44

## 2018-05-17 RX ADMIN — AMLODIPINE BESYLATE 5 MILLIGRAM(S): 2.5 TABLET ORAL at 05:43

## 2018-05-17 RX ADMIN — Medication 6 UNIT(S): at 22:14

## 2018-05-17 RX ADMIN — Medication 100 MILLIGRAM(S): at 21:45

## 2018-05-17 RX ADMIN — Medication 1 SPRAY(S): at 23:21

## 2018-05-17 RX ADMIN — Medication 10: at 22:14

## 2018-05-17 NOTE — PROGRESS NOTE ADULT - SUBJECTIVE AND OBJECTIVE BOX
Post op Note    Dx: Skullbase tumor    57y  Male  s/p endoscopic endonasal pituitary biopsy. POD #0   frozen c/w carcinoma, favor salivary origin, facor adenoid cystic    T(C): 36.4 (05-17-18 @ 14:50), Max: 36.4 (05-17-18 @ 14:50)  HR: 72 (05-17-18 @ 16:07) (60 - 84)  BP: 113/66 (05-17-18 @ 16:07) (113/66 - 144/93)  RR: 18 (05-17-18 @ 16:07) (11 - 25)  SpO2: 98% (05-17-18 @ 16:07) (94% - 99%)  Wt(kg): --      Physical exam  Awake, alert, oriented x 3, PERRL,   Baseline Left eye visual field deficit on left upper quadrant  EOMI intact.   Follows commands, speech clear; left nasal packing in place,  no CSF leakage noted, no drains   KHALIL X4 with good strength   Voiding,     Plan:   - continue neurocheck q 1 hour  - vitals q 1 hour  - pain control   - HOB up to 30 degrees  - ENT involved,   - abx till packing, on Clindamycin q 8, will be on home abx   - jones precaustions  - f/u with ENT about apcking duration, f/u appointment   - cotinue bactroban oinment for MRSA q 12 for a total of 5 days, use on right nostril since left in packed   - nasal saline  - NS @ 75ml/hr, dc IVF once PO Post op Note    Dx: Skullbase tumor    57y  Male  s/p endoscopic endonasal pituitary biopsy. POD #0   frozen c/w carcinoma, favor salivary origin, facor adenoid cystic    T(C): 36.4 (05-17-18 @ 14:50), Max: 36.4 (05-17-18 @ 14:50)  HR: 72 (05-17-18 @ 16:07) (60 - 84)  BP: 113/66 (05-17-18 @ 16:07) (113/66 - 144/93)  RR: 18 (05-17-18 @ 16:07) (11 - 25)  SpO2: 98% (05-17-18 @ 16:07) (94% - 99%)  Wt(kg): --      Physical exam  Awake, alert, oriented x 3, PERRL,   Baseline Left eye visual field deficit on left upper quadrant  EOMI intact.   Follows commands, speech clear; left nasal packing in place,  no CSF leakage noted, no drains   KHALIL X4 with good strength   Voiding,     Plan:   - continue neurocheck q 1 hour  - vitals q 1 hour  - pain control prn with Tylenol, oxycodone  - decadron quick taper   - HOB up to 30 degrees  - ENT following, f/u with ENT regarding packing duration   - abx till packing, Continue clindamycin   - TSP precautions- no blowing nose, close mouth sneezing, bearing down or use of IS  - CSF leak watch- notify if any leakage seen   - continue Bactroban ointment for MRSA q 12 for a total of 5 days, use on right nostril since left in packed   - nasal saline on right nostril  - Patient seen by opthalmology, will follow up as outpatient for glaucoma w/u.   - NS @ 75ml/hr, dc IVF once PO   - ISS  - PPI for ulcer ppx   - continue home meds, amlodipine and losartan  - advance diet as tolerated  - bowel regimen prn   - OOB/ambulate, PT/OT     d/w Dr. Valenzuela and agrees

## 2018-05-17 NOTE — BRIEF OPERATIVE NOTE - OPERATION/FINDINGS
tumor, frozen consistent with carcinoma, favor salivary origin, favor adenoid cystic endoscopic endonasal biopsy through the left nostril/nare  tumor, frozen consistent with carcinoma, favor salivary origin, favor adenoid cystic

## 2018-05-17 NOTE — PROGRESS NOTE ADULT - SUBJECTIVE AND OBJECTIVE BOX
=================================  NEUROCRITICAL CARE ATTENDING NOTE  =================================    ASAF ROACH   MRN-[id number]  Summary:  57y/M  HPI:  57 year old Luxembourgish speaking male with HTN presenting to [facility] this past weekend with complaints of several weeks of dizziness, vomiting and blurriness of vision in the left eye. He has seen his PCP for these symptoms which would intermittently resolve. He called EMS when symptoms worsened with multiple episodes of vomiting. 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 Uses Aspirin daily as outpatient. Denies any hearing changes, bowel/bladder changes, extremity weakness, falls, seizures, syncope. Transferred to [facility] for definitive treatment. (10 May 2018 16:32)    Overnight Events:    PHYSICAL EXAMINATION  T(C): 36.7 (05-17 @ 17:42), Max: 36.7 (05-17 @ 17:42)  HR: 78 (05-17 @ 19:07) (60 - 84)  BP: 127/72 (05-17 @ 19:07) (113/66 - 144/93)  RR: 17 (05-17 @ 19:07) (11 - 25)  SpO2: 99% (05-17 @ 19:07) (94% - 99%)  CVP(mm Hg): --    LABS:  CAPILLARY BLOOD GLUCOSE  139 (17 May 2018 12:35)      POCT Blood Glucose.: 148 mg/dL (17 May 2018 16:35)  POCT Blood Glucose.: 139 mg/dL (17 May 2018 12:46)  POCT Blood Glucose.: 136 mg/dL (17 May 2018 07:14)  POCT Blood Glucose.: 199 mg/dL (16 May 2018 21:56)                          16.7   14.9  )-----------( 269      ( 16 May 2018 06:50 )             48.4     05-16    138  |  97  |  24<H>  ----------------------------<  149<H>  4.8   |  31  |  0.99    Ca    9.5      16 May 2018 06:50  Phos  3.7     05-16  Mg     2.6     05-16 05-16 @ 07:01  - 05-17 @ 07:00  --------------------------------------------------------  IN: 1280 mL / OUT: 1100 mL / NET: 180 mL    05-17 @ 07:01  - 05-17 @ 20:18  --------------------------------------------------------  IN: 210 mL / OUT: 475 mL / NET: -265 mL        Bacteriology:  CSF studies:  EEG:  Neuroimaging:  Other imaging:    MEDICATIONS:  MEDICATIONS  (STANDING):  amLODIPine   Tablet 5 milliGRAM(s) Oral daily  clindamycin IVPB 600 milliGRAM(s) IV Intermittent every 8 hours  dexamethasone     Tablet 4 milliGRAM(s) Oral every 6 hours  dexamethasone     Tablet   Oral   insulin lispro (HumaLOG) corrective regimen sliding scale   SubCutaneous Before meals and at bedtime  insulin lispro Injectable (HumaLOG) 6 Unit(s) SubCutaneous three times a day before meals  losartan 100 milliGRAM(s) Oral daily  mupirocin 2% Ointment 1 Application(s) Topical every 12 hours  pantoprazole    Tablet 40 milliGRAM(s) Oral daily  polyethylene glycol 3350 17 Gram(s) Oral daily  senna 2 Tablet(s) Oral at bedtime  sodium chloride 0.65% Nasal 1 Spray(s) Right Nostril four times a day  sodium chloride 0.9%. 1000 milliLiter(s) (50 mL/Hr) IV Continuous <Continuous>    MEDICATIONS  (PRN):  acetaminophen   Tablet. 650 milliGRAM(s) Oral every 6 hours PRN Moderate Pain (4 - 6)  docusate sodium 100 milliGRAM(s) Oral three times a day PRN Constipation  oxyCODONE    IR 5 milliGRAM(s) Oral every 4 hours PRN Moderate Pain (4 - 6)  senna 2 Tablet(s) Oral at bedtime PRN Constipation    IV FLUIDS:  DRIPS:  DIET:  Lines:  Drains:    05-16-18 @ 07:01  -  05-17-18 @ 07:00  --------------------------------------------------------  OUT:    Voided: 1100 mL  Total OUT: 1100 mL        Wounds:    CODE STATUS:  full code                       GOALS OF CARE:  aggressive                      DISPOSITION:  ICU =================================  NEUROCRITICAL CARE ATTENDING NOTE  =================================    ASAF ROACH   MRN-[id number]  Summary:  57M with Hypertension presented with dizziness, vomiting, BOV L eye.  Imaging at [facility] showed sellar lesion, extending into sphenoid cavernous sinus prepontine regions.  Transferred to [facility] on 05/10 for definitive treatment.  Overnight Events: Stepdown to 8La from PACU.  REVIEW OF SYSTEMS:  Occasional headaches, no nausea or vomiting; 14 -point review of systems otherwise unremarkable.    PAST MEDICAL & SURGICAL HISTORY: HTN (hypertension) History of cholecystectomy  Allergies: PCN  Home meds: ?    PHYSICAL EXAMINATION  T(C): 36.7 ( @ 17:42), Max: 36.7 ( @ 17:42) HR: 78 ( @ 19:07) (60 - 84) BP: 127/72 ( @ 19:07) (113/66 - 144/93) RR: 17 ( @ 19:07) (11 - 25) SpO2: 99% ( @ 19:07) (94% - 99%)  NEUROLOGIC EXAMINATION:  Patient is awake, alert, fully oriented, pupils 3mm equal and reactive to light, EOMs intact, muscle strength 5/5 on all 4 extremities; L upper quadrantanopsia  GENERAL:  not intubated, not in cardiorespiratory distress  EENT: anicteric, L nasal packing  CARDIOVASC:  (+) S1 S2, normal rate and regular rhythm  PULMONARY:  clear to auscultation bilaterally  ABDOMEN:  soft, nontender, with normoactive bowel sounds  EXTREMITIES:  no edema  SKIN:  no rash  MISC:      LABS:  CAPILLARY BLOOD GLUCOSE 148 139 136 199                        16.7   14.9  )-----------( 269      ( 16 May 2018 06:50 )             48.4     138  |  97  |  24<H>  ----------------------------<  149<H>  4.8   |  31  |  0.99    Ca    9.5      16 May 2018 06:50  Phos  3.7     -16  Mg     2.6      @ 07:01   @ 07:00  IN: 1280 mL / OUT: 1100 mL / NET: 180 mL    Bacteriology:  CSF studies:  EEG:  Neuroimagin/13 MRI destructive central skull base lesion, extra pituitary  Other imaging:    MEDICATIONS: amlodipine 5mg PO daily clindamycin 600mg IV q8h dexamethasone 4mg PO q6h mod ISS HR6 TID losartan 100mg PO daily mupirocin ointment q12h pantoprazole 40 daily miralax senna HS nasal spray oxycodone PRN docusate senna PRN    IV FLUIDS: NS@50cc/hr  DRIPS:  DIET: advance as tolerated  Lines:  Wounds:    CODE STATUS:  full code                       GOALS OF CARE:  aggressive                      DISPOSITION:  8[facility]

## 2018-05-17 NOTE — PACU DISCHARGE NOTE - COMMENTS
pt aao x3.  VSS.  left nostril nasal packing in place.  denies c/o pain.  reports decreased blurred vision to left eye.  denies c/o n/v.  report given to RN on 8 lachman.  pt to go [id number] via bed on monitor with RN and transport

## 2018-05-17 NOTE — PROGRESS NOTE ADULT - ASSESSMENT
PLAN:   NEURO:  REHAB:  physical therapy evaluation and management    EARLY MOB:      PULM:  Room air, incentive spirometry  CARDIO:  SBP goal 100-150mm Hg  ENDO:  Blood sugar goals 140-180 mg/dL, continue insulin sliding scale  GI:  PPI for GI prophylaxis  DIET:  RENAL:    HEM/ONC:  VTE Prophylaxis:   ID: afebrile, no leukocytosis  Social: will update family    Patient not at high risk for neurologic deterioration / death.  Time spent on this noncritically ill patient: 45 minutes.    REVIEW OF SYSTEMS:  No headaches, no nausea or vomiting; 14 -point review of systems otherwise unremarkable. /M with  1.  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 Valenzuela, Dr. Stewart, Dr. Wallvar  2.  Hypertension      PLAN:   NEURO: neurochecks q4h, pain meds with Tylenol, oxycodone  s/p resection of mass: decadron taper as per NS; CSF leak watch; f/u final histopathology  REHAB:  physical therapy evaluation and management    EARLY MOB:  ambulate as tolerated    PULM:  Room air, NO incentive spirometry, TSP precautions  CARDIO:  SBP goal 100-150mm Hg, continue amlodipine / losartan  ENDO:  Blood sugar goals 140-180 mg/dL, continue insulin sliding scale  GI:  PPI for GI prophylaxis while on steroids  DIET: advance as tolerated  RENAL:  NS@50cc/hr, d/c IVF once eating adequately  HEM/ONC: Hb stable  VTE Prophylaxis: SCDs only, no DVT chemoprophylaxis for now as patient is high risk for bleed (fresh post-op), baseline LE Doppler for DVT suspected on admission (h/o intracranial mass)  ID: afebrile, no leukocytosis, clindamycin while nasal packing is in - f/u with ENT; bactroban ointment for MRSA q12 x5 days  Social: will update family    Patient not at high risk for neurologic deterioration / death.  Time spent on this noncritically ill patient: 45 minutes.

## 2018-05-18 ENCOUNTER — TRANSCRIPTION ENCOUNTER (OUTPATIENT)
Age: 58
End: 2018-05-18

## 2018-05-18 VITALS — TEMPERATURE: 97 F

## 2018-05-18 PROBLEM — I10 ESSENTIAL (PRIMARY) HYPERTENSION: Chronic | Status: ACTIVE | Noted: 2018-05-10

## 2018-05-18 LAB
ANION GAP SERPL CALC-SCNC: 7 MMOL/L — SIGNIFICANT CHANGE UP (ref 5–17)
BUN SERPL-MCNC: 31 MG/DL — HIGH (ref 7–23)
CALCIUM SERPL-MCNC: 8.5 MG/DL — SIGNIFICANT CHANGE UP (ref 8.4–10.5)
CHLORIDE SERPL-SCNC: 98 MMOL/L — SIGNIFICANT CHANGE UP (ref 96–108)
CO2 SERPL-SCNC: 31 MMOL/L — SIGNIFICANT CHANGE UP (ref 22–31)
CREAT SERPL-MCNC: 0.95 MG/DL — SIGNIFICANT CHANGE UP (ref 0.5–1.3)
GLUCOSE SERPL-MCNC: 132 MG/DL — HIGH (ref 70–99)
HCT VFR BLD CALC: 43.2 % — SIGNIFICANT CHANGE UP (ref 39–50)
HGB BLD-MCNC: 14.8 G/DL — SIGNIFICANT CHANGE UP (ref 13–17)
MAGNESIUM SERPL-MCNC: 2.7 MG/DL — HIGH (ref 1.6–2.6)
MCHC RBC-ENTMCNC: 31.2 PG — SIGNIFICANT CHANGE UP (ref 27–34)
MCHC RBC-ENTMCNC: 34.3 G/DL — SIGNIFICANT CHANGE UP (ref 32–36)
MCV RBC AUTO: 90.9 FL — SIGNIFICANT CHANGE UP (ref 80–100)
PHOSPHATE SERPL-MCNC: 4 MG/DL — SIGNIFICANT CHANGE UP (ref 2.5–4.5)
PLATELET # BLD AUTO: 257 K/UL — SIGNIFICANT CHANGE UP (ref 150–400)
POTASSIUM SERPL-MCNC: 4.9 MMOL/L — SIGNIFICANT CHANGE UP (ref 3.5–5.3)
POTASSIUM SERPL-SCNC: 4.9 MMOL/L — SIGNIFICANT CHANGE UP (ref 3.5–5.3)
RBC # BLD: 4.75 M/UL — SIGNIFICANT CHANGE UP (ref 4.2–5.8)
RBC # FLD: 13.2 % — SIGNIFICANT CHANGE UP (ref 10.3–16.9)
SODIUM SERPL-SCNC: 136 MMOL/L — SIGNIFICANT CHANGE UP (ref 135–145)
WBC # BLD: 16.2 K/UL — HIGH (ref 3.8–10.5)
WBC # FLD AUTO: 16.2 K/UL — HIGH (ref 3.8–10.5)

## 2018-05-18 PROCEDURE — 99232 SBSQ HOSP IP/OBS MODERATE 35: CPT | Mod: GC

## 2018-05-18 PROCEDURE — 99231 SBSQ HOSP IP/OBS SF/LOW 25: CPT | Mod: 24

## 2018-05-18 RX ORDER — SODIUM CHLORIDE 0.65 %
1 AEROSOL, SPRAY (ML) NASAL
Qty: 20 | Refills: 0 | OUTPATIENT
Start: 2018-05-18 | End: 2018-05-24

## 2018-05-18 RX ORDER — DOCUSATE SODIUM 100 MG
1 CAPSULE ORAL
Qty: 30 | Refills: 0 | OUTPATIENT
Start: 2018-05-18 | End: 2018-05-27

## 2018-05-18 RX ORDER — SENNA PLUS 8.6 MG/1
2 TABLET ORAL
Qty: 20 | Refills: 0 | OUTPATIENT
Start: 2018-05-18 | End: 2018-05-27

## 2018-05-18 RX ORDER — OXYCODONE HYDROCHLORIDE 5 MG/1
1 TABLET ORAL
Qty: 42 | Refills: 0 | OUTPATIENT
Start: 2018-05-18 | End: 2018-05-24

## 2018-05-18 RX ORDER — SODIUM CHLORIDE 0.65 %
1 AEROSOL, SPRAY (ML) NASAL
Qty: 20 | Refills: 0 | OUTPATIENT
Start: 2018-05-18

## 2018-05-18 RX ORDER — ACETAMINOPHEN 500 MG
2 TABLET ORAL
Qty: 240 | Refills: 0 | OUTPATIENT
Start: 2018-05-18 | End: 2018-06-16

## 2018-05-18 RX ORDER — DEXAMETHASONE 0.5 MG/5ML
4 ELIXIR ORAL
Qty: 16 | Refills: 0 | OUTPATIENT
Start: 2018-05-18 | End: 2018-05-18

## 2018-05-18 RX ORDER — AMLODIPINE BESYLATE 2.5 MG/1
1 TABLET ORAL
Qty: 0 | Refills: 0 | COMMUNITY
Start: 2018-05-18

## 2018-05-18 RX ORDER — MUPIROCIN 20 MG/G
1 OINTMENT TOPICAL
Qty: 1 | Refills: 0 | OUTPATIENT
Start: 2018-05-18 | End: 2018-05-18

## 2018-05-18 RX ORDER — ASPIRIN/CALCIUM CARB/MAGNESIUM 324 MG
1 TABLET ORAL
Qty: 0 | Refills: 0 | COMMUNITY

## 2018-05-18 RX ORDER — DOCUSATE SODIUM 100 MG
1 CAPSULE ORAL
Qty: 10 | Refills: 0 | OUTPATIENT
Start: 2018-05-18 | End: 2018-05-27

## 2018-05-18 RX ORDER — POLYETHYLENE GLYCOL 3350 17 G/17G
17 POWDER, FOR SOLUTION ORAL
Qty: 170 | Refills: 0 | OUTPATIENT
Start: 2018-05-18 | End: 2018-05-27

## 2018-05-18 RX ORDER — MUPIROCIN 20 MG/G
1 OINTMENT TOPICAL EVERY 12 HOURS
Qty: 0 | Refills: 0 | Status: DISCONTINUED | OUTPATIENT
Start: 2018-05-18 | End: 2018-05-18

## 2018-05-18 RX ORDER — SODIUM CHLORIDE 0.65 %
1 AEROSOL, SPRAY (ML) NASAL
Qty: 50 | Refills: 0 | OUTPATIENT
Start: 2018-05-18 | End: 2018-05-24

## 2018-05-18 RX ORDER — OXYCODONE HYDROCHLORIDE 5 MG/1
1 TABLET ORAL
Qty: 12 | Refills: 0 | OUTPATIENT
Start: 2018-05-18 | End: 2018-05-20

## 2018-05-18 RX ADMIN — Medication 100 MILLIGRAM(S): at 15:24

## 2018-05-18 RX ADMIN — Medication 4 MILLIGRAM(S): at 18:07

## 2018-05-18 RX ADMIN — Medication 1 SPRAY(S): at 12:06

## 2018-05-18 RX ADMIN — Medication 4 MILLIGRAM(S): at 12:06

## 2018-05-18 RX ADMIN — Medication 6 UNIT(S): at 12:06

## 2018-05-18 RX ADMIN — MUPIROCIN 1 APPLICATION(S): 20 OINTMENT TOPICAL at 05:36

## 2018-05-18 RX ADMIN — Medication 4 MILLIGRAM(S): at 04:46

## 2018-05-18 RX ADMIN — LOSARTAN POTASSIUM 100 MILLIGRAM(S): 100 TABLET, FILM COATED ORAL at 05:35

## 2018-05-18 RX ADMIN — Medication 1 SPRAY(S): at 05:35

## 2018-05-18 RX ADMIN — Medication 4: at 12:06

## 2018-05-18 RX ADMIN — Medication 1 SPRAY(S): at 18:08

## 2018-05-18 RX ADMIN — Medication 6 UNIT(S): at 07:22

## 2018-05-18 RX ADMIN — MUPIROCIN 1 APPLICATION(S): 20 OINTMENT TOPICAL at 18:08

## 2018-05-18 RX ADMIN — AMLODIPINE BESYLATE 5 MILLIGRAM(S): 2.5 TABLET ORAL at 05:35

## 2018-05-18 RX ADMIN — POLYETHYLENE GLYCOL 3350 17 GRAM(S): 17 POWDER, FOR SOLUTION ORAL at 12:06

## 2018-05-18 RX ADMIN — Medication 100 MILLIGRAM(S): at 05:35

## 2018-05-18 RX ADMIN — Medication 6 UNIT(S): at 17:16

## 2018-05-18 RX ADMIN — PANTOPRAZOLE SODIUM 40 MILLIGRAM(S): 20 TABLET, DELAYED RELEASE ORAL at 12:06

## 2018-05-18 NOTE — DISCHARGE NOTE ADULT - HOSPITAL COURSE
HPI:  57 year old German speaking male with HTN presenting to [facility] this past weekend with complaints of several weeks of dizziness, vomiting and blurriness of vision in the left eye. He has seen his PCP for these symptoms which would intermittently resolve. He called EMS when symptoms worsened with multiple episodes of vomiting. 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 Uses Aspirin daily as outpatient. Denies any hearing changes, bowel/bladder changes, extremity weakness, falls, seizures, syncope. Transferred to [facility] for definitive treatment. (10 May 2018 16:32)    OVERNIGHT EVENTS: No acute events overnight, patient remained on nasal tent, supplemental O2, no CSF leak noted.   Hospital course:     5/17: s/p endoscopic endonasal pituitary biopsy. POD #0   frozen c/w carcinoma, favor salivary origin, facor adenoid cystic; extubated, awake, alert, no deficits.   Opthalmology consulted, increased IOP, recs patient to f/u as outpatient   ENT recs: will remove packing today, f/u as outpatient on Monday at 12 pm. continue nasal saline drop   PET scan as outpatient   f/u with Dr. Valenzuela on Friday for discussion about future management based on final histopathology   - no home PT needs. Uncomplicated post operative course

## 2018-05-18 NOTE — DISCHARGE NOTE ADULT - NS AS ACTIVITY OBS
Do not drive or operate machinery/Walking-Indoors allowed/Do not make important decisions/No Heavy lifting/straining/Walking-Outdoors allowed/Showering allowed

## 2018-05-18 NOTE — PROGRESS NOTE ADULT - PROBLEM SELECTOR PLAN 4
I started pre-meal insulin to control the blood sugar in the range between one 140-180. I will adjust the regimen was taper steroids. BS is controlled
I started pre-meal insulin to control the blood sugar in the range between one 140-180. I will adjust the regimen was taper steroids

## 2018-05-18 NOTE — PROGRESS NOTE ADULT - PROVIDER SPECIALTY LIST ADULT
Internal Medicine
NSICU
NSICU
Neurosurgery
Internal Medicine

## 2018-05-18 NOTE — DISCHARGE NOTE ADULT - PROVIDER TOKENS
FREE:[LAST:[jud],FIRST:[Alexander],PHONE:[phone]],FAX:[phone]    -],ADDRESS:[[address] [place] [address]]]

## 2018-05-18 NOTE — PROGRESS NOTE ADULT - SUBJECTIVE AND OBJECTIVE BOX
HPI:  57 year old Danish speaking male with HTN presenting to [facility] this past weekend with complaints of several weeks of dizziness, vomiting and blurriness of vision in the left eye. He has seen his PCP for these symptoms which would intermittently resolve. He called EMS when symptoms worsened with multiple episodes of vomiting. 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 Uses Aspirin daily as outpatient. Denies any hearing changes, bowel/bladder changes, extremity weakness, falls, seizures, syncope. Transferred to [facility] for definitive treatment. (10 May 2018 16:32)    OVERNIGHT EVENTS:  Vital Signs Last 24 Hrs  T(C): 36.7 (18 May 2018 05:03), Max: 36.7 (17 May 2018 17:42)  T(F): 98.1 (18 May 2018 05:03), Max: 98.1 (17 May 2018 17:42)  HR: 60 (18 May 2018 04:20) (60 - 84)  BP: 119/59 (18 May 2018 04:20) (95/53 - 138/81)  BP(mean): 84 (18 May 2018 04:20) (69 - 93)  RR: 18 (18 May 2018 04:20) (11 - 25)  SpO2: 96% (18 May 2018 04:20) (94% - 99%)    I&O's Summary    16 May 2018 07:01  -  17 May 2018 07:00  --------------------------------------------------------  IN: 1280 mL / OUT: 1100 mL / NET: 180 mL    17 May 2018 07:01  -  18 May 2018 06:19  --------------------------------------------------------  IN: 1145 mL / OUT: 1525 mL / NET: -380 mL        PHYSICAL EXAM:  Neurological:    Motor exam:         [] Upper extremity              Bi(c5)  WE(c6)  EE(c7)   FF(c8)                                                R         5/5        5/5        5/5       5/5                                               L          5/5        5/5        5/5       5/5         [] Lower extremeity          HF(l2)   KE(l3)    TA(l4)   EHL(l5)  GS(s1)                                                 R        5/5        5/5        5/5       5/5         5/5                                               L         5/5        5/5       5/5       5/5          5/5                                                        [] warm well perfused; capillary refill <3 seconds     Sensation: [] intact to light touch  [] decreased:       Cardiovascular:  Respiratory:  Gastrointestinal:  Genitourinary:  Extremities:  Incision/Wound:    TUBES/LINES:  [] Irby  [] Lumbar Drain  [] Wound Drains  [] Others      DIET:  [] NPO  [] Mechanical  [] Tube feeds    LABS:                        16.7   14.9  )-----------( 269      ( 16 May 2018 06:50 )             48.4     05-16    138  |  97  |  24<H>  ----------------------------<  149<H>  4.8   |  31  |  0.99    Ca    9.5      16 May 2018 06:50  Phos  3.7     05-16  Mg     2.6     05-16              CAPILLARY BLOOD GLUCOSE  139 (17 May 2018 12:35)      POCT Blood Glucose.: 372 mg/dL (17 May 2018 22:06)  POCT Blood Glucose.: 148 mg/dL (17 May 2018 16:35)  POCT Blood Glucose.: 139 mg/dL (17 May 2018 12:46)  POCT Blood Glucose.: 136 mg/dL (17 May 2018 07:14)      Drug Levels: [] N/A    CSF Analysis: [] N/A      Allergies    penicillin (Rash)    Intolerances      MEDICATIONS:  Antibiotics:  clindamycin IVPB 600 milliGRAM(s) IV Intermittent every 8 hours    Neuro:  acetaminophen   Tablet. 650 milliGRAM(s) Oral every 6 hours PRN  oxyCODONE    IR 5 milliGRAM(s) Oral every 4 hours PRN    Anticoagulation:    OTHER:  amLODIPine   Tablet 5 milliGRAM(s) Oral daily  dexamethasone     Tablet 4 milliGRAM(s) Oral every 6 hours  dexamethasone     Tablet 4 milliGRAM(s) Oral every 8 hours  dexamethasone     Tablet   Oral   docusate sodium 100 milliGRAM(s) Oral three times a day PRN  insulin lispro (HumaLOG) corrective regimen sliding scale   SubCutaneous Before meals and at bedtime  insulin lispro Injectable (HumaLOG) 6 Unit(s) SubCutaneous three times a day before meals  losartan 100 milliGRAM(s) Oral daily  mupirocin 2% Ointment 1 Application(s) Topical every 12 hours  pantoprazole    Tablet 40 milliGRAM(s) Oral daily  polyethylene glycol 3350 17 Gram(s) Oral daily  senna 2 Tablet(s) Oral at bedtime PRN  senna 2 Tablet(s) Oral at bedtime  sodium chloride 0.65% Nasal 1 Spray(s) Right Nostril four times a day    IVF:  sodium chloride 0.9%. 1000 milliLiter(s) IV Continuous <Continuous>    CULTURES:    RADIOLOGY & ADDITIONAL TESTS:      ASSESSMENT:  57y Male s/p    BRAIN TUMOR  No h/o HF  No pertinent family history in first degree relatives  Handoff  MEWS Score  HTN (hypertension)  Skull mass  Skull mass  Postoperative state  Preoperative clearance  Elevated blood sugar  GERD (gastroesophageal reflux disease)  HTN (hypertension)  Pituitary adenoma  Biopsy  History of cholecystectomy      PLAN:  NEURO:    CARDIOVASCULAR:    PULMONARY:    RENAL:    GI:    HEME:    ID:    ENDO:    DVT PROPHYLAXIS:  [] Venodynes                                [] Heparin/Lovenox    DISPOSITION: HPI:  57 year old Polish speaking male with HTN presenting to [facility] this past weekend with complaints of several weeks of dizziness, vomiting and blurriness of vision in the left eye. He has seen his PCP for these symptoms which would intermittently resolve. He called EMS when symptoms worsened with multiple episodes of vomiting. 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 Uses Aspirin daily as outpatient. Denies any hearing changes, bowel/bladder changes, extremity weakness, falls, seizures, syncope. Transferred to [facility] for definitive treatment. (10 May 2018 16:32)    OVERNIGHT EVENTS: No acute events overnight, patient remained on nasal tent, supplemental O2, no CSF leak noted.   Hospital course:     5/17: s/p endoscopic endonasal pituitary biopsy. POD #0   frozen c/w carcinoma, favor salivary origin, facor adenoid cystic; extubated, awake, alert, no deficits.   Opthalmology consulted, increased IOP, recs patient to f/u as outpatient     Vital Signs Last 24 Hrs  T(C): 36.7 (18 May 2018 05:03), Max: 36.7 (17 May 2018 17:42)  T(F): 98.1 (18 May 2018 05:03), Max: 98.1 (17 May 2018 17:42)  HR: 60 (18 May 2018 04:20) (60 - 84)  BP: 119/59 (18 May 2018 04:20) (95/53 - 138/81)  BP(mean): 84 (18 May 2018 04:20) (69 - 93)  RR: 18 (18 May 2018 04:20) (11 - 25)  SpO2: 96% (18 May 2018 04:20) (94% - 99%)    I&O's Summary    16 May 2018 07:01  -  17 May 2018 07:00  --------------------------------------------------------  IN: 1280 mL / OUT: 1100 mL / NET: 180 mL    17 May 2018 07:01  -  18 May 2018 06:19  --------------------------------------------------------  IN: 1145 mL / OUT: 1525 mL / NET: -380 mL        PHYSICAL EXAM:  Neurological: AAOX3, NAd, FC, OES, KHALIL spontaneously  No facial droop, merocel packing in place, oxygen tent in place.   CN II-XII grossly intact. CN 6 palsy improving, visual field intact.   Motor exam; 5/5 all thourout   Sensation: [x] intact to light touch  [] decreased:   Gastrointestinal: NT, ND   Genitourinary: no lawrence, voidign   Extremities: wwp, no c/c/e   Incision/Wound: c/d/i , no leaking or oozing     TUBES/LINES:  [] Lawrence  [] Lumbar Drain  [] Wound Drains  [] Others  [x] IVL   DIET:  [] NPO  [x] Mechanical  [] Tube feeds    LABS:                        16.7   14.9  )-----------( 269      ( 16 May 2018 06:50 )             48.4     05-16    138  |  97  |  24<H>  ----------------------------<  149<H>  4.8   |  31  |  0.99    Ca    9.5      16 May 2018 06:50  Phos  3.7     05-16  Mg     2.6     05-16              CAPILLARY BLOOD GLUCOSE  139 (17 May 2018 12:35)      POCT Blood Glucose.: 372 mg/dL (17 May 2018 22:06)  POCT Blood Glucose.: 148 mg/dL (17 May 2018 16:35)  POCT Blood Glucose.: 139 mg/dL (17 May 2018 12:46)  POCT Blood Glucose.: 136 mg/dL (17 May 2018 07:14)      Drug Levels: [] N/A    CSF Analysis: [] N/A      Allergies    penicillin (Rash)    Intolerances      MEDICATIONS:  Antibiotics:  clindamycin IVPB 600 milliGRAM(s) IV Intermittent every 8 hours    Neuro:  acetaminophen   Tablet. 650 milliGRAM(s) Oral every 6 hours PRN  oxyCODONE    IR 5 milliGRAM(s) Oral every 4 hours PRN    Anticoagulation:    OTHER:  amLODIPine   Tablet 5 milliGRAM(s) Oral daily  dexamethasone     Tablet 4 milliGRAM(s) Oral every 6 hours  dexamethasone     Tablet 4 milliGRAM(s) Oral every 8 hours  dexamethasone     Tablet   Oral   docusate sodium 100 milliGRAM(s) Oral three times a day PRN  insulin lispro (HumaLOG) corrective regimen sliding scale   SubCutaneous Before meals and at bedtime  insulin lispro Injectable (HumaLOG) 6 Unit(s) SubCutaneous three times a day before meals  losartan 100 milliGRAM(s) Oral daily  mupirocin 2% Ointment 1 Application(s) Topical every 12 hours  pantoprazole    Tablet 40 milliGRAM(s) Oral daily  polyethylene glycol 3350 17 Gram(s) Oral daily  senna 2 Tablet(s) Oral at bedtime PRN  senna 2 Tablet(s) Oral at bedtime  sodium chloride 0.65% Nasal 1 Spray(s) Right Nostril four times a day    IVF:  sodium chloride 0.9%. 1000 milliLiter(s) IV Continuous <Continuous>    CULTURES:    RADIOLOGY & ADDITIONAL TESTS:      ASSESSMENT:  57y Male s/p endoscopic endonasal biopsy through left nostril. Frozen c/w carcinoma, favoring salivary origin/adenoid cystic. POD #1     BRAIN TUMOR  No h/o HF  No pertinent family history in first degree relatives  Handoff  MEWS Score  HTN (hypertension)  Skull mass  Skull mass  Postoperative state  Preoperative clearance  Elevated blood sugar  GERD (gastroesophageal reflux disease)  HTN (hypertension)  Pituitary adenoma  Biopsy  History of cholecystectomy      PLAN:  NEURO:  - PET CT scan as outpatient   - neurocheck q 4  - Vitals q 4   - pain control PRN with Tylenol, oxycodone   - decadron taper   - merocel packing duration per ENT   - abx till packing out   - continue mupirocin ointment for a total of 5 days   - nasal saline spray   - f/u as outpatient with Dr. Munoz regarding final pathology and further planning  - continue TSP precautions   - f/u as outpatient with opthalmology (Dr. Helio Franks)   CARDIOVASCULAR:  - -150  PULMONARY:  - continue nasal tent for now, satting well, in no respiratory distress   RENAL:  - IVL   GI:  - regular diet   HEME:  - H&H stable   ID:  - afebrile, leukocytosis likely decadron related.   ENDO:  - ISS   DVT PROPHYLAXIS:   [x] Venodynes                                [] Heparin/Lovenox    DISPOSITION: Home today   No PT needs   d/w Dr. Valenzuela

## 2018-05-18 NOTE — DISCHARGE NOTE ADULT - REASON FOR ADMISSION
Transfer from another facility with pituitary mass Transfer from another facility with trice snowden

## 2018-05-18 NOTE — DISCHARGE NOTE ADULT - PATIENT PORTAL LINK FT
You can access the [facility] Patient Portal, offered by [facility], by registering with the following website: [email or web address]

## 2018-05-18 NOTE — DISCHARGE NOTE ADULT - MEDICATION SUMMARY - MEDICATIONS TO TAKE
I will START or STAY ON the medications listed below when I get home from the hospital:    dexamethasone 1 mg oral tablet  -- 4 tab every 6 h 1 day  4 tab every 8 h 1 day  2 tab every 8 h 1 day  2 tab every 12 h 1 day  1 tab every 12 h 1 day  1 tab every 24 h 1 day  -- It is very important that you take or use this exactly as directed.  Do not skip doses or discontinue unless directed by your doctor.  Obtain medical advice before taking any non-prescription drugs as some may affect the action of this medication.  Take with food or milk.    -- Indication: For Pituitary adenoma    Tylenol 325 mg oral tablet  -- 2 tab(s) by mouth every 6 hours, As needed, Moderate Pain (4 - 6) MDD:3000 mg  -- Indication: For Pain    Roxicodone 5 mg oral tablet  -- 1 tab(s) by mouth every 4 hours, As needed, Moderate Pain (4 - 6) MDD:6 tabs  -- Indication: For Pain    losartan 100 mg oral tablet  -- 1 tab(s) by mouth once a day  -- Indication: For Hypertension    Norvasc 5 mg oral tablet  -- 1 tab(s) by mouth once a day  -- Indication: For Hypertension    mupirocin 2% topical ointment  -- 1 application on skin every 12 hours   *on right nostril only*   -- Indication: For Nasal antibiotic (MRSA)     Colace 100 mg oral capsule  -- 1 cap(s) by mouth 3 times a day, As needed, Constipation  -- Indication: For Constipation    MiraLax oral powder for reconstitution  -- 17 gram(s) by mouth once a day  -- Indication: For Constipation    Ayr Saline Mist 0.65% nasal spray  -- 1 spray(s) into nose 2 times a day   -- Indication: For Nasal decongestatn     omeprazole  -- 40 milligram(s) by mouth once a day  -- Indication: For GERD (gastroesophageal reflux disease) I will START or STAY ON the medications listed below when I get home from the hospital:    dexamethasone 1 mg oral tablet  -- 4 tab every 6 h 1 day  4 tab every 8 h 1 day  2 tab every 8 h 1 day  2 tab every 12 h 1 day  1 tab every 12 h 1 day  1 tab every 24 h 1 day  -- It is very important that you take or use this exactly as directed.  Do not skip doses or discontinue unless directed by your doctor.  Obtain medical advice before taking any non-prescription drugs as some may affect the action of this medication.  Take with food or milk.    -- Indication: For Pituitary adenoma    Roxicodone 5 mg oral tablet  -- 1 tab(s) by mouth every 4 hours, As needed, Moderate Pain (4 - 6) MDD:6 tabs  -- Indication: For Pain    Tylenol 325 mg oral tablet  -- 2 tab(s) by mouth every 6 hours, As needed, Moderate Pain (4 - 6) MDD:3000 mg  -- Indication: For Pain    losartan 100 mg oral tablet  -- 1 tab(s) by mouth once a day  -- Indication: For Hypertension    Norvasc 5 mg oral tablet  -- 1 tab(s) by mouth once a day  -- Indication: For Hypertension    mupirocin 2% topical ointment  -- 1 application on skin every 12 hours   *on right nostril only*   -- Indication: For Infection (MRSA)     Colace 100 mg oral capsule  -- 1 cap(s) by mouth 3 times a day, As needed, Constipation  -- Indication: For Constipation    MiraLax oral powder for reconstitution  -- 17 gram(s) by mouth once a day  -- Indication: For Constipation    Ayr Saline Mist 0.65% nasal spray  -- 1 spray(s) into nose 2 times a day   -- Indication: For Nasal decongestant     omeprazole  -- 40 milligram(s) by mouth once a day  -- Indication: For GERD (gastroesophageal reflux disease) I will START or STAY ON the medications listed below when I get home from the hospital:    dexamethasone 1 mg oral tablet  -- 4 tab every 6 h 1 day  4 tab every 8 h 1 day  2 tab every 8 h 1 day  2 tab every 12 h 1 day  1 tab every 12 h 1 day  1 tab every 24 h 1 day  -- It is very important that you take or use this exactly as directed.  Do not skip doses or discontinue unless directed by your doctor.  Obtain medical advice before taking any non-prescription drugs as some may affect the action of this medication.  Take with food or milk.    -- Indication: For Pituitary adenoma    losartan 100 mg oral tablet  -- 1 tab(s) by mouth once a day  -- Indication: For Hypertension    Norvasc 5 mg oral tablet  -- 1 tab(s) by mouth once a day  -- Indication: For Hypertension    clindamycin 300 mg oral capsule  -- 2 cap(s) by mouth every 8 hours   -- Finish all this medication unless otherwise directed by prescriber.  Medication should be taken with plenty of water.    -- Indication: For Postoperative prophylaxis     omeprazole  -- 40 milligram(s) by mouth once a day  -- Indication: For GERD (gastroesophageal reflux disease)

## 2018-05-18 NOTE — DISCHARGE NOTE ADULT - PLAN OF CARE
Finalize histopathology and care plan determination - You had a transphenoidal biopsy of pituitary adenoma, frozen section was consistent with carcinoma. Final histopathology needs to be followed up with for further differentiation. Depending on the pathology result further management will be decided. IMPORTANT: Please follow up with Dr. Valenzuela on FRIDAY (5/25/18) and the [facility] location. Please call office immediately upon discharge at [phone] for an appointment.   -  You nasal merocel packing will be removed by ENT today. Visit your ENT Provider - Dr. Stewart  on Monday at 12 PM for a follow up appointment. Continue taking the nasal saline spray on both nostrils two times a day.   - You are on a decadron tapered dose, which will be explained to you by your pharmacist, please take protonix for gastric ulcer prophylaxis.   - You should walk out of bed and ambulate every hour, rest if you are tired. No heavy lifting, bending, twisting recommended.  - Transphenoidal precautions: no straws, no incentive spirometer, no sneezing with mouth closed, no straining with bowel movement.   - You should also get a PET CT scan done as outpatient, call ENT doctor (Terry) for further guidance. Resolution - follow up with your outpatient opthalmology. Inpatient opthalmology measured slightly elevated intraocular pressure which indicates to possible glaucoma. Please visit your previous Doctor located at [facility] [address].   - you have mentioned that you had an appointment on 22nd. Please call them and confirm. - You had a transphenoidal biopsy of pituitary adenoma, frozen section was consistent with carcinoma. Final histopathology needs to be followed up with for further differentiation. Depending on the pathology result further management will be decided. IMPORTANT: Please follow up with Dr. Valenzuela on FRIDAY (5/25/18) and the [facility] location. Please call office immediately upon discharge at [phone] for an appointment.   -  You nasal merocel packing will be removed by ENT today. Visit your ENT Provider - Dr. Stewart  on Monday at 12 PM for a follow up appointment. Continue taking the nasal saline spray on both nostrils two times a day.   - You are on a decadron tapered dose, which will be explained to you by your pharmacist, please take protonix for gastric ulcer prophylaxis.   - You should walk out of bed and ambulate every hour, rest if you are tired. No heavy lifting, bending, twisting recommended.  - Transphenoidal precautions: no straws, no incentive spirometer, no sneezing with mouth closed, no straining with bowel movement.   - You should also get a PET CT scan done as outpatient, call ENT doctor (Terry) for further guidance.  - You were on prophylactic dose of baby Aspirin at home, do not take it since you just had surgery, please inquire about when to restart aspirin when you see Dr. Valenzuela in a week from now. - You had a transphenoidal biopsy of pituitary adenoma, frozen section was consistent with carcinoma. Final histopathology needs to be followed up with for further differentiation. Depending on the pathology result further management will be decided. IMPORTANT: Please follow up with Dr. Valenzuela on FRIDAY (5/25/18) and the [facility] location. Please call office immediately upon discharge at [phone] for an appointment.   -  You nasal merocel packing will be removed by ENT today. Visit your ENT Provider - Dr. Stewart  on Monday at 12 PM for a follow up appointment. Continue taking the nasal saline spray on both nostrils two times a day.   - You are on a decadron tapered dose, which will be explained to you by your pharmacist, please take protonix for gastric ulcer prophylaxis.   - You should walk out of bed and ambulate every hour, rest if you are tired. No heavy lifting, bending, twisting recommended.  - Transphenoidal precautions: no straws, no incentive spirometer, no sneezing with mouth closed, no straining with bowel movement.   - You should also get a PET CT scan done as outpatient, call ENT doctor (Terry) for further guidance.  - You were on prophylactic dose of baby Aspirin at home, do not take it since you just had surgery, please inquire about when to restart aspirin when you see Dr. Valenzuela in a week from now.  - follow up as outpatient with your primary care doctor regarding your blood sugar level, they are likely steroid related. Normal blood sugar levels - Patient had few elevated fingersticks and received insulin coverage. This is most likely decadron related which is ordered as a taper dose. Blood sugar should normalize after decadron course is completed. Highly advise patient to closely follow up with primary care doctor upon discharge. - You had a transphenoidal biopsy of sellar mass, frozen section was consistent with carcinoma. Final histopathology needs to be followed up with for further differentiation. Depending on the pathology result further management will be decided. IMPORTANT: Please follow up with Dr. Valenzuela on FRIDAY (5/25/18) and the [facility] location. Please call office immediately upon discharge at [phone] for an appointment.   -  You nasal merocel packing will be removed by ENT today. Visit your ENT Provider - Dr. Stewart  on Monday at 12 PM for a follow up appointment. Continue taking the nasal saline spray on both nostrils two times a day.   - You are on a decadron tapered dose, which will be explained to you by your pharmacist, please take protonix for gastric ulcer prophylaxis.   - You should walk out of bed and ambulate every hour, rest if you are tired. No heavy lifting, bending, twisting recommended.  - Transphenoidal precautions: no straws, no incentive spirometer, no sneezing with mouth closed, no straining with bowel movement.   - You should also get a PET CT scan done as outpatient, call ENT doctor (Terry) for further guidance.  - You were on prophylactic dose of baby Aspirin at home, do not take it since you just had surgery, please inquire about when to restart aspirin when you see Dr. Valenzuela in a week from now.  - follow up as outpatient with your primary care doctor regarding your blood sugar level, they are likely steroid related.

## 2018-05-18 NOTE — PROGRESS NOTE ADULT - SUBJECTIVE AND OBJECTIVE BOX
=================================  NEUROCRITICAL CARE ATTENDING NOTE  =================================    ASAF ROACH   MRN-[id number]  Summary:  57M with Hypertension presented with dizziness, vomiting, BOV L eye.  Imaging at [facility] showed sellar lesion, extending into sphenoid cavernous sinus prepontine regions.  Transferred to [facility] on 05/10 for biopsy.  Overnight Events: No significant events overnight.  REVIEW OF SYSTEMS:  Occasional headaches, no nausea or vomiting; 14 -point review of systems otherwise unremarkable.    PAST MEDICAL & SURGICAL HISTORY: HTN (hypertension) History of cholecystectomy  Allergies: PCN  Home meds: ?    PHYSICAL EXAMINATION  T(C): 36.6 (18 @ 14:53), Max: 36.7 ( @ 17:42) HR: 68 ( @ 12:40) (58 - 78) BP: 122/68 (-18 @ 12:40) (95/53 - 127/72) RR: 18 (18 @ 12:40) (17 - 18) SpO2: 96% ( @ 12:40) (94% - 99%)  NEUROLOGIC EXAMINATION:  Patient is awake, alert, fully oriented, pupils 3mm equal and reactive to light, EOMs intact, muscle strength 5/5 on all 4 extremities; L upper quadrantanopsia?  GENERAL:  not intubated, not in cardiorespiratory distress  EENT: anicteric, L nasal packing  CARDIOVASC:  (+) S1 S2, normal rate and regular rhythm  PULMONARY:  clear to auscultation bilaterally  ABDOMEN:  soft, nontender, with normoactive bowel sounds  EXTREMITIES:  no edema  SKIN:  no rash    LABS:  CAPILLARY BLOOD GLUCOSE [phone] 148               14.8   16.2  )-----------( 257      ( 18 May 2018 06:21 )             43.2     136  |  98  |  31<H>  ----------------------------<  132<H>  4.9   |  31  |  0.95    Ca    8.5      18 May 2018 06:21  Phos  4.0     -18  Mg     2.7      @ 07:01   @ 07:00  IN: 1295 mL / OUT: 1800 mL / NET: -505 mL    Bacteriology:  CSF studies:  EEG:  Neuroimagin/13 MRI destructive central skull base lesion, extra pituitary  Other imaging:    MEDICATIONS: amlodipine 5mg daily clindamycin 600 mg IV q8h dexamethasone taper docusate PRN mod ISS HR 6 TID premeals losartan 100mg daily mupirocin ointment q12h oxycodone 5mg q4h PRN pantoprazole 40 daily miralax senna 2mg HS nasal spray    IV FLUIDS:   DRIPS:  DIET: advance as tolerated  Lines:  Wounds:    CODE STATUS:  full code                       GOALS OF CARE:  aggressive                      DISPOSITION:  8La

## 2018-05-18 NOTE — DISCHARGE NOTE ADULT - CARE PLAN
Principal Discharge DX:	Pituitary adenoma  Goal:	Finalize histopathology and care plan determination  Assessment and plan of treatment:	- You had a transphenoidal biopsy of pituitary adenoma, frozen section was consistent with carcinoma. Final histopathology needs to be followed up with for further differentiation. Depending on the pathology result further management will be decided. IMPORTANT: Please follow up with Dr. Valenzuela on FRIDAY (5/25/18) and the [facility] location. Please call office immediately upon discharge at [phone] for an appointment.   -  You nasal merocel packing will be removed by ENT today. Visit your ENT Provider - Dr. Stewart  on Monday at 12 PM for a follow up appointment. Continue taking the nasal saline spray on both nostrils two times a day.   - You are on a decadron tapered dose, which will be explained to you by your pharmacist, please take protonix for gastric ulcer prophylaxis.   - You should walk out of bed and ambulate every hour, rest if you are tired. No heavy lifting, bending, twisting recommended.  - Transphenoidal precautions: no straws, no incentive spirometer, no sneezing with mouth closed, no straining with bowel movement.   - You should also get a PET CT scan done as outpatient, call ENT doctor (Terry) for further guidance.  Secondary Diagnosis:	Cranial nerve VI palsy  Goal:	Resolution  Assessment and plan of treatment:	- follow up with your outpatient opthalmology. Inpatient opthalmology measured slightly elevated intraocular pressure which indicates to possible glaucoma. Please visit your previous Doctor located at [facility] [address] [place] [address].   - you have mentioned that you had an appointment on 22nd. Please call them and confirm. Principal Discharge DX:	Pituitary adenoma  Goal:	Finalize histopathology and care plan determination  Assessment and plan of treatment:	- You had a transphenoidal biopsy of pituitary adenoma, frozen section was consistent with carcinoma. Final histopathology needs to be followed up with for further differentiation. Depending on the pathology result further management will be decided. IMPORTANT: Please follow up with Dr. Valenzuela on FRIDAY (5/25/18) and the [facility] location. Please call office immediately upon discharge at [phone] for an appointment.   -  You nasal merocel packing will be removed by ENT today. Visit your ENT Provider - Dr. tSewart  on Monday at 12 PM for a follow up appointment. Continue taking the nasal saline spray on both nostrils two times a day.   - You are on a decadron tapered dose, which will be explained to you by your pharmacist, please take protonix for gastric ulcer prophylaxis.   - You should walk out of bed and ambulate every hour, rest if you are tired. No heavy lifting, bending, twisting recommended.  - Transphenoidal precautions: no straws, no incentive spirometer, no sneezing with mouth closed, no straining with bowel movement.   - You should also get a PET CT scan done as outpatient, call ENT doctor (Terry) for further guidance.  - You were on prophylactic dose of baby Aspirin at home, do not take it since you just had surgery, please inquire about when to restart aspirin when you see Dr. Valenzuela in a week from now.  Secondary Diagnosis:	Cranial nerve VI palsy  Goal:	Resolution  Assessment and plan of treatment:	- follow up with your outpatient opthalmology. Inpatient opthalmology measured slightly elevated intraocular pressure which indicates to possible glaucoma. Please [facility] [address] [place] [address].   - you have mentioned that you had an appointment on 22nd. Please call them and confirm. Principal Discharge DX:	Pituitary adenoma  Goal:	Finalize histopathology and care plan determination  Assessment and plan of treatment:	- You had a transphenoidal biopsy of pituitary adenoma, frozen section was consistent with carcinoma. Final histopathology needs to be followed up with for further differentiation. Depending on the pathology result further management will be decided. IMPORTANT: Please follow up with Dr. Valenzuela on FRIDAY (5/25/18) and the [facility] location. Please call office immediately upon discharge at [phone] for an appointment.   -  You nasal merocel packing will be removed by ENT today. Visit your ENT Provider - Dr. Stewart  on Monday at 12 PM for a follow up appointment. Continue taking the nasal saline spray on both nostrils two times a day.   - You are on a decadron tapered dose, which will be explained to you by your pharmacist, please take protonix for gastric ulcer prophylaxis.   - You should walk out of bed and ambulate every hour, rest if you are tired. No heavy lifting, bending, twisting recommended.  - Transphenoidal precautions: no straws, no incentive spirometer, no sneezing with mouth closed, no straining with bowel movement.   - You should also get a PET CT scan done as outpatient, call ENT doctor (Terry) for further guidance.  - You were on prophylactic dose of baby Aspirin at home, do not take it since you just had surgery, please inquire about when to restart aspirin when you see Dr. Valenzuela in a week from now.  - follow up as outpatient with your primary care doctor regarding your blood sugar level, they are likely steroid related.  Secondary Diagnosis:	Cranial nerve VI palsy  Goal:	Resolution  Assessment and plan of treatment:	- follow up with your outpatient opthalmology. Inpatient opthalmology measured slightly elevated intraocular pressure which indicates to possible glaucoma. Please visit your previous Doctor located at [address]   - you have mentioned that you had an appointment on 22nd. Please call them and confirm. Principal Discharge DX:	Pituitary adenoma  Goal:	Finalize histopathology and care plan determination  Assessment and plan of treatment:	- You had a transphenoidal biopsy of pituitary adenoma, frozen section was consistent with carcinoma. Final histopathology needs to be followed up with for further differentiation. Depending on the pathology result further management will be decided. IMPORTANT: Please follow up with Dr. Valenzuela on FRIDAY (5/25/18) and the [facility] location. Please call office immediately upon discharge at [phone] for an appointment.   -  You nasal merocel packing will be removed by ENT today. Visit your ENT Provider - Dr. Stewart  on Monday at 12 PM for a follow up appointment. Continue taking the nasal saline spray on both nostrils two times a day.   - You are on a decadron tapered dose, which will be explained to you by your pharmacist, please take protonix for gastric ulcer prophylaxis.   - You should walk out of bed and ambulate every hour, rest if you are tired. No heavy lifting, bending, twisting recommended.  - Transphenoidal precautions: no straws, no incentive spirometer, no sneezing with mouth closed, no straining with bowel movement.   - You should also get a PET CT scan done as outpatient, call ENT doctor (Terry) for further guidance.  - You were on prophylactic dose of baby Aspirin at home, do not take it since you just had surgery, please inquire about when to restart aspirin when you see Dr. Valenzuela in a week from now.  - follow up as outpatient with your primary care doctor regarding your blood sugar level, they are likely steroid related.  Secondary Diagnosis:	Cranial nerve VI palsy  Goal:	Resolution  Assessment and plan of treatment:	- follow up with your outpatient opthalmology. Inpatient opthalmology measured slightly elevated intraocular pressure which indicates to possible glaucoma. Please visit your previous Doctor located at [facility] [address] [facility] [address].   - you have mentioned that you had an appointment on 22nd. Please call them and confirm.  Secondary Diagnosis:	Hyperglycemia  Goal:	Normal blood sugar levels  Assessment and plan of treatment:	- Patient had few elevated fingersticks and received insulin coverage. This is most likely decadron related which is ordered as a taper dose. Blood sugar should normalize after decadron course is completed. Highly advise patient to closely follow up with primary care doctor upon discharge. Principal Discharge DX:	Pituitary adenoma  Goal:	Finalize histopathology and care plan determination  Assessment and plan of treatment:	- You had a transphenoidal biopsy of sellar mass, frozen section was consistent with carcinoma. Final histopathology needs to be followed up with for further differentiation. Depending on the pathology result further management will be decided. IMPORTANT: Please follow up with Dr. Valenzuela on FRIDAY (5/25/18) and the [facility] location. Please call office immediately upon discharge at [phone] for an appointment.   -  You nasal merocel packing will be removed by ENT today. Visit your ENT Provider - Dr. Stewart  on Monday at 12 PM for a follow up appointment. Continue taking the nasal saline spray on both nostrils two times a day.   - You are on a decadron tapered dose, which will be explained to you by your pharmacist, please take protonix for gastric ulcer prophylaxis.   - You should walk out of bed and ambulate every hour, rest if you are tired. No heavy lifting, bending, twisting recommended.  - Transphenoidal precautions: no straws, no incentive spirometer, no sneezing with mouth closed, no straining with bowel movement.   - You should also get a PET CT scan done as outpatient, call ENT doctor (Terry) for further guidance.  - You were on prophylactic dose of baby Aspirin at home, do not take it since you just had surgery, please inquire about when to restart aspirin when you see Dr. Valenzuela in a week from now.  - follow up as outpatient with your primary care doctor regarding your blood sugar level, they are likely steroid related.  Secondary Diagnosis:	Cranial nerve VI palsy  Goal:	Resolution  Assessment and plan of treatment:	- follow up with your outpatient opthalmology. Inpatient opthalmology measured slightly elevated intraocular pressure which indicates to possible glaucoma. Please visit your previous Doctor located at [facility] [address] [place] [address].   - you have mentioned that you had an appointment on 22nd. Please call them and confirm.  Secondary Diagnosis:	Hyperglycemia  Goal:	Normal blood sugar levels  Assessment and plan of treatment:	- Patient had few elevated fingersticks and received insulin coverage. This is most likely decadron related which is ordered as a taper dose. Blood sugar should normalize after decadron course is completed. Highly advise patient to closely follow up with primary care doctor upon discharge.

## 2018-05-18 NOTE — PROGRESS NOTE ADULT - ATTENDING COMMENTS
Patient seen and examined with house-staff during bedside rounds.  Resident note read, including vitals, physical findings, laboratory data, and radiological reports.   Revisions included below.  Direct personal management at bed side and extensive interpretation of the data.  Plan was outlined and discussed in details with the housestaff.  Decision making of high complexity  Action taken for acute disease activity to reflect the level of care provided:  - medication reconciliation  - review laboratory data

## 2018-05-18 NOTE — PROGRESS NOTE ADULT - ASSESSMENT
/M with  1.  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 Valenzuela, Dr. Stewart, Dr. Wallvar  2.  Hypertension    PLAN:   NEURO: neurochecks q4h, pain meds with Tylenol, oxycodone  s/p resection of mass: decadron taper as per NS; f/u final histopathology  REHAB:  physical therapy evaluation and management    EARLY MOB:  ambulate as tolerated    PULM:  Room air, NO incentive spirometry, TSP precautions  CARDIO:  SBP goal 100-150mm Hg, continue amlodipine / losartan  ENDO:  Blood sugar goals 140-180 mg/dL, continue insulin sliding scale, HR6 TID, check A1C.  GI:  PPI for GI prophylaxis while on steroids  DIET: regular diet  RENAL:  IVL  HEM/ONC: Hb stable  VTE Prophylaxis: SCDs only, no DVT chemoprophylaxis (pending discharge)   ID: afebrile, no leukocytosis, ABx, discontinued, nasal packing out, bactroban ointment for MRSA q12 x5 days  Social: will update family    Patient not at high risk for neurologic deterioration / death.  Time spent on this noncritically ill patient: 45 minutes.

## 2018-05-18 NOTE — PROGRESS NOTE ADULT - SUBJECTIVE AND OBJECTIVE BOX
Interval Events: Reviewed  Patient seen and examined at bedside.    Patient is a 57y old  Male who presents with a chief complaint of Transfer from another facility with pituitary mass (18 May 2018 11:18)    he is doing well.  pain is controlled .  he walked to bathroom  PAST MEDICAL & SURGICAL HISTORY:  HTN (hypertension)  History of cholecystectomy      MEDICATIONS:  Pulmonary:    Antimicrobials:  clindamycin IVPB 600 milliGRAM(s) IV Intermittent every 8 hours    Anticoagulants:    Cardiac:  amLODIPine   Tablet 5 milliGRAM(s) Oral daily  losartan 100 milliGRAM(s) Oral daily      Allergies    penicillin (Rash)    Intolerances        Vital Signs Last 24 Hrs  T(C): 36.1 (18 May 2018 17:38), Max: 36.7 (18 May 2018 05:03)  T(F): 97 (18 May 2018 17:38), Max: 98.1 (18 May 2018 05:03)  HR: 68 (18 May 2018 12:40) (58 - 72)  BP: 122/68 (18 May 2018 12:40) (95/53 - 122/68)  BP(mean): 90 (18 May 2018 12:40) (69 - 90)  RR: 18 (18 May 2018 12:40) (17 - 18)  SpO2: 96% (18 May 2018 12:40) (94% - 96%)    05-17 @ 07:01  -  05-18 @ 07:00  --------------------------------------------------------  IN: 1295 mL / OUT: 1800 mL / NET: -505 mL    05-18 @ 07:01  -  05-18 @ 19:23  --------------------------------------------------------  IN: 0 mL / OUT: 1200 mL / NET: -1200 mL          LABS:      CBC Full  -  ( 18 May 2018 06:21 )  WBC Count : 16.2 K/uL  Hemoglobin : 14.8 g/dL  Hematocrit : 43.2 %  Platelet Count - Automated : 257 K/uL  Mean Cell Volume : 90.9 fL  Mean Cell Hemoglobin : 31.2 pg  Mean Cell Hemoglobin Concentration : 34.3 g/dL  Auto Neutrophil # : x  Auto Lymphocyte # : x  Auto Monocyte # : x  Auto Eosinophil # : x  Auto Basophil # : x  Auto Neutrophil % : x  Auto Lymphocyte % : x  Auto Monocyte % : x  Auto Eosinophil % : x  Auto Basophil % : x    05-18    136  |  98  |  31<H>  ----------------------------<  132<H>  4.9   |  31  |  0.95    Ca    8.5      18 May 2018 06:21  Phos  4.0     05-18  Mg     2.7     05-18                          RADIOLOGY & ADDITIONAL STUDIES (The following images were personally reviewed):  Irby:                                     No  Urine output:                       adequate  DVT prophylaxis:                 Yes  Flattus:                                  Yes  Bowel movement:              No

## 2018-05-18 NOTE — PROGRESS NOTE ADULT - PROBLEM SELECTOR PLAN 2
Away pathology. Patient is on Decadron. I would discuss the case with neurosurgery regarding follow up with endocrine
Away pathology. Patient is on Decadron. I would discuss the case with neurosurgery regarding follow up with endocrine

## 2018-05-21 ENCOUNTER — APPOINTMENT (OUTPATIENT)
Dept: RADIATION ONCOLOGY | Facility: CLINIC | Age: 58
End: 2018-05-21

## 2018-05-21 ENCOUNTER — APPOINTMENT (OUTPATIENT)
Dept: OTOLARYNGOLOGY | Facility: CLINIC | Age: 58
End: 2018-05-21
Payer: MEDICAID

## 2018-05-21 VITALS
TEMPERATURE: 98.1 F | HEART RATE: 76 BPM | SYSTOLIC BLOOD PRESSURE: 105 MMHG | OXYGEN SATURATION: 97 % | DIASTOLIC BLOOD PRESSURE: 74 MMHG

## 2018-05-21 VITALS
SYSTOLIC BLOOD PRESSURE: 121 MMHG | HEART RATE: 85 BPM | OXYGEN SATURATION: 97 % | TEMPERATURE: 99.6 F | DIASTOLIC BLOOD PRESSURE: 82 MMHG | BODY MASS INDEX: 34.5 KG/M2 | WEIGHT: 241 LBS | HEIGHT: 70 IN

## 2018-05-21 PROCEDURE — 31231 NASAL ENDOSCOPY DX: CPT | Mod: 58

## 2018-05-21 PROCEDURE — 99024 POSTOP FOLLOW-UP VISIT: CPT

## 2018-05-23 DIAGNOSIS — Z90.49 ACQUIRED ABSENCE OF OTHER SPECIFIED PARTS OF DIGESTIVE TRACT: ICD-10-CM

## 2018-05-23 DIAGNOSIS — R73.9 HYPERGLYCEMIA, UNSPECIFIED: ICD-10-CM

## 2018-05-23 DIAGNOSIS — H49.22 SIXTH [ABDUCENT] NERVE PALSY, LEFT EYE: ICD-10-CM

## 2018-05-23 DIAGNOSIS — K21.9 GASTRO-ESOPHAGEAL REFLUX DISEASE WITHOUT ESOPHAGITIS: ICD-10-CM

## 2018-05-23 DIAGNOSIS — H40.9 UNSPECIFIED GLAUCOMA: ICD-10-CM

## 2018-05-23 DIAGNOSIS — Z88.0 ALLERGY STATUS TO PENICILLIN: ICD-10-CM

## 2018-05-23 DIAGNOSIS — T38.0X5A ADVERSE EFFECT OF GLUCOCORTICOIDS AND SYNTHETIC ANALOGUES, INITIAL ENCOUNTER: ICD-10-CM

## 2018-05-23 DIAGNOSIS — G58.8 OTHER SPECIFIED MONONEUROPATHIES: ICD-10-CM

## 2018-05-23 DIAGNOSIS — R42 DIZZINESS AND GIDDINESS: ICD-10-CM

## 2018-05-23 DIAGNOSIS — C75.1 MALIGNANT NEOPLASM OF PITUITARY GLAND: ICD-10-CM

## 2018-05-23 DIAGNOSIS — I10 ESSENTIAL (PRIMARY) HYPERTENSION: ICD-10-CM

## 2018-05-23 DIAGNOSIS — D72.829 ELEVATED WHITE BLOOD CELL COUNT, UNSPECIFIED: ICD-10-CM

## 2018-05-23 PROCEDURE — 80048 BASIC METABOLIC PNL TOTAL CA: CPT

## 2018-05-23 PROCEDURE — 93005 ELECTROCARDIOGRAM TRACING: CPT

## 2018-05-23 PROCEDURE — 82962 GLUCOSE BLOOD TEST: CPT

## 2018-05-23 PROCEDURE — 85610 PROTHROMBIN TIME: CPT

## 2018-05-23 PROCEDURE — C1889: CPT

## 2018-05-23 PROCEDURE — 88341 IMHCHEM/IMCYTCHM EA ADD ANTB: CPT

## 2018-05-23 PROCEDURE — C9399: CPT

## 2018-05-23 PROCEDURE — 87641 MR-STAPH DNA AMP PROBE: CPT

## 2018-05-23 PROCEDURE — 83735 ASSAY OF MAGNESIUM: CPT

## 2018-05-23 PROCEDURE — 86901 BLOOD TYPING SEROLOGIC RH(D): CPT

## 2018-05-23 PROCEDURE — 83036 HEMOGLOBIN GLYCOSYLATED A1C: CPT

## 2018-05-23 PROCEDURE — 85730 THROMBOPLASTIN TIME PARTIAL: CPT

## 2018-05-23 PROCEDURE — 85027 COMPLETE CBC AUTOMATED: CPT

## 2018-05-23 PROCEDURE — 70552 MRI BRAIN STEM W/DYE: CPT

## 2018-05-23 PROCEDURE — 71045 X-RAY EXAM CHEST 1 VIEW: CPT

## 2018-05-23 PROCEDURE — 88331 PATH CONSLTJ SURG 1 BLK 1SPC: CPT

## 2018-05-23 PROCEDURE — 86850 RBC ANTIBODY SCREEN: CPT

## 2018-05-23 PROCEDURE — 86900 BLOOD TYPING SEROLOGIC ABO: CPT

## 2018-05-23 PROCEDURE — 86923 COMPATIBILITY TEST ELECTRIC: CPT

## 2018-05-23 PROCEDURE — 97161 PT EVAL LOW COMPLEX 20 MIN: CPT

## 2018-05-23 PROCEDURE — 84100 ASSAY OF PHOSPHORUS: CPT

## 2018-05-23 PROCEDURE — 36415 COLL VENOUS BLD VENIPUNCTURE: CPT

## 2018-05-23 PROCEDURE — A9585: CPT

## 2018-05-23 PROCEDURE — 70486 CT MAXILLOFACIAL W/O DYE: CPT

## 2018-05-23 PROCEDURE — 81001 URINALYSIS AUTO W/SCOPE: CPT

## 2018-05-23 PROCEDURE — 88305 TISSUE EXAM BY PATHOLOGIST: CPT

## 2018-06-04 ENCOUNTER — APPOINTMENT (OUTPATIENT)
Dept: OTOLARYNGOLOGY | Facility: CLINIC | Age: 58
End: 2018-06-04
Payer: MEDICAID

## 2018-06-04 ENCOUNTER — APPOINTMENT (OUTPATIENT)
Dept: RADIATION ONCOLOGY | Facility: CLINIC | Age: 58
End: 2018-06-04
Payer: MEDICAID

## 2018-06-04 VITALS
DIASTOLIC BLOOD PRESSURE: 83 MMHG | OXYGEN SATURATION: 99 % | RESPIRATION RATE: 18 BRPM | BODY MASS INDEX: 33.58 KG/M2 | WEIGHT: 234 LBS | SYSTOLIC BLOOD PRESSURE: 157 MMHG | HEART RATE: 8 BPM

## 2018-06-04 DIAGNOSIS — I10 ESSENTIAL (PRIMARY) HYPERTENSION: ICD-10-CM

## 2018-06-04 DIAGNOSIS — Z80.42 FAMILY HISTORY OF MALIGNANT NEOPLASM OF PROSTATE: ICD-10-CM

## 2018-06-04 DIAGNOSIS — Z80.0 FAMILY HISTORY OF MALIGNANT NEOPLASM OF DIGESTIVE ORGANS: ICD-10-CM

## 2018-06-04 DIAGNOSIS — Z78.9 OTHER SPECIFIED HEALTH STATUS: ICD-10-CM

## 2018-06-04 DIAGNOSIS — Z80.49 FAMILY HISTORY OF MALIGNANT NEOPLASM OF OTHER GENITAL ORGANS: ICD-10-CM

## 2018-06-04 PROCEDURE — 99205 OFFICE O/P NEW HI 60 MIN: CPT | Mod: 25

## 2018-06-04 PROCEDURE — 99213 OFFICE O/P EST LOW 20 MIN: CPT | Mod: 25

## 2018-06-04 PROCEDURE — 31237 NSL/SINS NDSC SURG BX POLYPC: CPT | Mod: 50,58

## 2018-06-06 ENCOUNTER — FORM ENCOUNTER (OUTPATIENT)
Age: 58
End: 2018-06-06

## 2018-06-07 ENCOUNTER — OUTPATIENT (OUTPATIENT)
Dept: OUTPATIENT SERVICES | Facility: HOSPITAL | Age: 58
LOS: 1 days | End: 2018-06-07
Payer: COMMERCIAL

## 2018-06-07 ENCOUNTER — APPOINTMENT (OUTPATIENT)
Dept: MRI IMAGING | Facility: HOSPITAL | Age: 58
End: 2018-06-07

## 2018-06-07 DIAGNOSIS — Z90.49 ACQUIRED ABSENCE OF OTHER SPECIFIED PARTS OF DIGESTIVE TRACT: Chronic | ICD-10-CM

## 2018-06-07 LAB — GLUCOSE BLDC GLUCOMTR-MCNC: 105 MG/DL — HIGH (ref 70–99)

## 2018-06-07 PROCEDURE — 78815 PET IMAGE W/CT SKULL-THIGH: CPT

## 2018-06-07 PROCEDURE — 82962 GLUCOSE BLOOD TEST: CPT

## 2018-06-07 PROCEDURE — 70543 MRI ORBT/FAC/NCK W/O &W/DYE: CPT | Mod: 26

## 2018-06-07 PROCEDURE — A9552: CPT

## 2018-06-07 PROCEDURE — 78815 PET IMAGE W/CT SKULL-THIGH: CPT | Mod: 26

## 2018-06-07 PROCEDURE — 70543 MRI ORBT/FAC/NCK W/O &W/DYE: CPT

## 2018-06-07 PROCEDURE — A9585: CPT

## 2018-06-08 ENCOUNTER — APPOINTMENT (OUTPATIENT)
Dept: RADIATION ONCOLOGY | Facility: CLINIC | Age: 58
End: 2018-06-08
Payer: MEDICAID

## 2018-06-08 VITALS
DIASTOLIC BLOOD PRESSURE: 99 MMHG | HEART RATE: 88 BPM | RESPIRATION RATE: 18 BRPM | SYSTOLIC BLOOD PRESSURE: 159 MMHG | OXYGEN SATURATION: 99 % | WEIGHT: 234 LBS | BODY MASS INDEX: 33.58 KG/M2

## 2018-06-08 PROCEDURE — 99214 OFFICE O/P EST MOD 30 MIN: CPT

## 2018-06-08 RX ORDER — DOCUSATE SODIUM 100 MG/1
100 CAPSULE, LIQUID FILLED ORAL
Qty: 30 | Refills: 0 | Status: DISCONTINUED | COMMUNITY
Start: 2018-05-18 | End: 2018-06-08

## 2018-06-08 RX ORDER — OXYCODONE 5 MG/1
5 TABLET ORAL
Qty: 42 | Refills: 0 | Status: DISCONTINUED | COMMUNITY
Start: 2018-05-18 | End: 2018-06-08

## 2018-06-08 RX ORDER — SODIUM CHLORIDE 0.65 %
0.65 AEROSOL, SPRAY (ML) NASAL
Qty: 44 | Refills: 0 | Status: DISCONTINUED | COMMUNITY
Start: 2018-05-18 | End: 2018-06-08

## 2018-06-08 RX ORDER — DEXAMETHASONE 1 MG/1
1 TABLET ORAL
Qty: 41 | Refills: 0 | Status: DISCONTINUED | COMMUNITY
Start: 2018-05-18 | End: 2018-06-08

## 2018-06-08 RX ORDER — MUPIROCIN 20 MG/G
2 OINTMENT TOPICAL
Qty: 22 | Refills: 0 | Status: DISCONTINUED | COMMUNITY
Start: 2018-05-18 | End: 2018-06-08

## 2018-06-15 RX ORDER — SODIUM CHLORIDE 9 MG/ML
1000 INJECTION INTRAMUSCULAR; INTRAVENOUS; SUBCUTANEOUS ONCE
Qty: 0 | Refills: 0 | Status: COMPLETED | OUTPATIENT
Start: 2018-06-25 | End: 2018-06-25

## 2018-06-15 RX ORDER — DIPHENHYDRAMINE HCL 50 MG
25 CAPSULE ORAL ONCE
Qty: 0 | Refills: 0 | Status: COMPLETED | OUTPATIENT
Start: 2018-06-25 | End: 2018-06-25

## 2018-06-15 RX ORDER — CISPLATIN 1 MG/ML
90 INJECTION, SOLUTION INTRAVENOUS ONCE
Qty: 0 | Refills: 0 | Status: COMPLETED | OUTPATIENT
Start: 2018-06-25 | End: 2018-06-25

## 2018-06-21 ENCOUNTER — APPOINTMENT (OUTPATIENT)
Dept: RADIATION ONCOLOGY | Facility: CLINIC | Age: 58
End: 2018-06-21
Payer: MEDICAID

## 2018-06-21 VITALS
DIASTOLIC BLOOD PRESSURE: 91 MMHG | OXYGEN SATURATION: 98 % | HEART RATE: 92 BPM | SYSTOLIC BLOOD PRESSURE: 148 MMHG | RESPIRATION RATE: 18 BRPM

## 2018-06-21 VITALS — WEIGHT: 241 LBS | BODY MASS INDEX: 34.58 KG/M2

## 2018-06-21 PROCEDURE — 99212 OFFICE O/P EST SF 10 MIN: CPT

## 2018-06-25 ENCOUNTER — APPOINTMENT (OUTPATIENT)
Dept: INFUSION THERAPY | Facility: HOSPITAL | Age: 58
End: 2018-06-25

## 2018-06-25 ENCOUNTER — OUTPATIENT (OUTPATIENT)
Dept: OUTPATIENT SERVICES | Facility: HOSPITAL | Age: 58
LOS: 1 days | End: 2018-06-25
Payer: COMMERCIAL

## 2018-06-25 VITALS
TEMPERATURE: 97 F | SYSTOLIC BLOOD PRESSURE: 137 MMHG | HEART RATE: 75 BPM | RESPIRATION RATE: 16 BRPM | DIASTOLIC BLOOD PRESSURE: 85 MMHG | OXYGEN SATURATION: 98 %

## 2018-06-25 VITALS
TEMPERATURE: 97 F | OXYGEN SATURATION: 98 % | SYSTOLIC BLOOD PRESSURE: 156 MMHG | DIASTOLIC BLOOD PRESSURE: 94 MMHG | HEART RATE: 96 BPM | RESPIRATION RATE: 16 BRPM

## 2018-06-25 DIAGNOSIS — Z90.49 ACQUIRED ABSENCE OF OTHER SPECIFIED PARTS OF DIGESTIVE TRACT: Chronic | ICD-10-CM

## 2018-06-25 DIAGNOSIS — C08.9 MALIGNANT NEOPLASM OF MAJOR SALIVARY GLAND, UNSPECIFIED: ICD-10-CM

## 2018-06-25 PROCEDURE — 96413 CHEMO IV INFUSION 1 HR: CPT

## 2018-06-25 PROCEDURE — 96361 HYDRATE IV INFUSION ADD-ON: CPT

## 2018-06-25 PROCEDURE — 96415 CHEMO IV INFUSION ADDL HR: CPT

## 2018-06-25 PROCEDURE — 96375 TX/PRO/DX INJ NEW DRUG ADDON: CPT

## 2018-06-25 RX ORDER — ONDANSETRON 8 MG/1
16 TABLET, FILM COATED ORAL ONCE
Qty: 0 | Refills: 0 | Status: COMPLETED | OUTPATIENT
Start: 2018-06-25 | End: 2018-06-25

## 2018-06-25 RX ORDER — DEXAMETHASONE 0.5 MG/5ML
12 ELIXIR ORAL ONCE
Qty: 0 | Refills: 0 | Status: COMPLETED | OUTPATIENT
Start: 2018-06-25 | End: 2018-06-25

## 2018-06-25 RX ADMIN — SODIUM CHLORIDE 1000 MILLILITER(S): 9 INJECTION INTRAMUSCULAR; INTRAVENOUS; SUBCUTANEOUS at 13:18

## 2018-06-25 RX ADMIN — Medication 212 MILLIGRAM(S): at 13:18

## 2018-06-25 RX ADMIN — CISPLATIN 393.33 MILLIGRAM(S): 1 INJECTION, SOLUTION INTRAVENOUS at 13:17

## 2018-06-25 RX ADMIN — ONDANSETRON 232 MILLIGRAM(S): 8 TABLET, FILM COATED ORAL at 13:18

## 2018-06-25 RX ADMIN — Medication 202 MILLIGRAM(S): at 13:18

## 2018-06-26 VITALS
HEART RATE: 74 BPM | SYSTOLIC BLOOD PRESSURE: 150 MMHG | WEIGHT: 246.9 LBS | RESPIRATION RATE: 18 BRPM | OXYGEN SATURATION: 95 % | DIASTOLIC BLOOD PRESSURE: 90 MMHG | BODY MASS INDEX: 35.43 KG/M2

## 2018-06-29 DIAGNOSIS — E86.0 DEHYDRATION: ICD-10-CM

## 2018-06-29 DIAGNOSIS — R11.2 NAUSEA WITH VOMITING, UNSPECIFIED: ICD-10-CM

## 2018-06-29 RX ORDER — DIPHENHYDRAMINE HCL 50 MG
25 CAPSULE ORAL ONCE
Qty: 0 | Refills: 0 | Status: COMPLETED | OUTPATIENT
Start: 2018-07-05 | End: 2018-07-05

## 2018-06-29 RX ORDER — CISPLATIN 1 MG/ML
90 INJECTION, SOLUTION INTRAVENOUS ONCE
Qty: 0 | Refills: 0 | Status: COMPLETED | OUTPATIENT
Start: 2018-07-05 | End: 2018-07-05

## 2018-06-29 RX ORDER — ONDANSETRON 8 MG/1
16 TABLET, FILM COATED ORAL ONCE
Qty: 0 | Refills: 0 | Status: COMPLETED | OUTPATIENT
Start: 2018-07-05 | End: 2018-07-05

## 2018-06-29 RX ORDER — DEXAMETHASONE 0.5 MG/5ML
12 ELIXIR ORAL ONCE
Qty: 0 | Refills: 0 | Status: COMPLETED | OUTPATIENT
Start: 2018-07-05 | End: 2018-07-05

## 2018-06-29 RX ORDER — SODIUM CHLORIDE 9 MG/ML
1000 INJECTION INTRAMUSCULAR; INTRAVENOUS; SUBCUTANEOUS ONCE
Qty: 0 | Refills: 0 | Status: COMPLETED | OUTPATIENT
Start: 2018-07-05 | End: 2018-07-05

## 2018-07-03 VITALS — DIASTOLIC BLOOD PRESSURE: 96 MMHG | SYSTOLIC BLOOD PRESSURE: 154 MMHG | HEART RATE: 85 BPM | OXYGEN SATURATION: 97 %

## 2018-07-03 VITALS
HEART RATE: 88 BPM | SYSTOLIC BLOOD PRESSURE: 161 MMHG | OXYGEN SATURATION: 96 % | WEIGHT: 241.4 LBS | DIASTOLIC BLOOD PRESSURE: 107 MMHG | RESPIRATION RATE: 18 BRPM | BODY MASS INDEX: 34.64 KG/M2

## 2018-07-05 ENCOUNTER — APPOINTMENT (OUTPATIENT)
Dept: INFUSION THERAPY | Facility: HOSPITAL | Age: 58
End: 2018-07-05

## 2018-07-05 ENCOUNTER — OUTPATIENT (OUTPATIENT)
Dept: OUTPATIENT SERVICES | Facility: HOSPITAL | Age: 58
LOS: 1 days | End: 2018-07-05
Payer: COMMERCIAL

## 2018-07-05 VITALS
RESPIRATION RATE: 18 BRPM | TEMPERATURE: 97 F | DIASTOLIC BLOOD PRESSURE: 78 MMHG | SYSTOLIC BLOOD PRESSURE: 160 MMHG | HEIGHT: 70 IN | HEART RATE: 91 BPM | OXYGEN SATURATION: 97 % | WEIGHT: 240.08 LBS

## 2018-07-05 VITALS
SYSTOLIC BLOOD PRESSURE: 148 MMHG | RESPIRATION RATE: 18 BRPM | DIASTOLIC BLOOD PRESSURE: 76 MMHG | TEMPERATURE: 97 F | HEART RATE: 88 BPM | OXYGEN SATURATION: 98 %

## 2018-07-05 DIAGNOSIS — C08.9 MALIGNANT NEOPLASM OF MAJOR SALIVARY GLAND, UNSPECIFIED: ICD-10-CM

## 2018-07-05 DIAGNOSIS — Z90.49 ACQUIRED ABSENCE OF OTHER SPECIFIED PARTS OF DIGESTIVE TRACT: Chronic | ICD-10-CM

## 2018-07-05 PROCEDURE — 96415 CHEMO IV INFUSION ADDL HR: CPT

## 2018-07-05 PROCEDURE — 96361 HYDRATE IV INFUSION ADD-ON: CPT

## 2018-07-05 PROCEDURE — 96413 CHEMO IV INFUSION 1 HR: CPT

## 2018-07-05 PROCEDURE — 96375 TX/PRO/DX INJ NEW DRUG ADDON: CPT

## 2018-07-05 RX ADMIN — CISPLATIN 393.33 MILLIGRAM(S): 1 INJECTION, SOLUTION INTRAVENOUS at 09:43

## 2018-07-05 RX ADMIN — Medication 212 MILLIGRAM(S): at 09:10

## 2018-07-05 RX ADMIN — ONDANSETRON 232 MILLIGRAM(S): 8 TABLET, FILM COATED ORAL at 09:09

## 2018-07-05 RX ADMIN — SODIUM CHLORIDE 1000 MILLILITER(S): 9 INJECTION INTRAMUSCULAR; INTRAVENOUS; SUBCUTANEOUS at 09:46

## 2018-07-05 RX ADMIN — Medication 202 MILLIGRAM(S): at 09:09

## 2018-07-10 VITALS
RESPIRATION RATE: 18 BRPM | WEIGHT: 236 LBS | HEART RATE: 100 BPM | SYSTOLIC BLOOD PRESSURE: 184 MMHG | BODY MASS INDEX: 33.86 KG/M2 | DIASTOLIC BLOOD PRESSURE: 116 MMHG

## 2018-07-11 RX ORDER — CISPLATIN 1 MG/ML
90 INJECTION, SOLUTION INTRAVENOUS ONCE
Qty: 0 | Refills: 0 | Status: COMPLETED | OUTPATIENT
Start: 2018-07-12 | End: 2018-07-12

## 2018-07-11 RX ORDER — DEXAMETHASONE 0.5 MG/5ML
12 ELIXIR ORAL ONCE
Qty: 0 | Refills: 0 | Status: COMPLETED | OUTPATIENT
Start: 2018-07-12 | End: 2018-07-12

## 2018-07-11 RX ORDER — ONDANSETRON 8 MG/1
16 TABLET, FILM COATED ORAL ONCE
Qty: 0 | Refills: 0 | Status: COMPLETED | OUTPATIENT
Start: 2018-07-12 | End: 2018-07-12

## 2018-07-11 RX ORDER — DIPHENHYDRAMINE HCL 50 MG
25 CAPSULE ORAL ONCE
Qty: 0 | Refills: 0 | Status: COMPLETED | OUTPATIENT
Start: 2018-07-12 | End: 2018-07-12

## 2018-07-11 RX ORDER — SODIUM CHLORIDE 9 MG/ML
1000 INJECTION INTRAMUSCULAR; INTRAVENOUS; SUBCUTANEOUS ONCE
Qty: 0 | Refills: 0 | Status: COMPLETED | OUTPATIENT
Start: 2018-07-12 | End: 2018-07-12

## 2018-07-12 ENCOUNTER — APPOINTMENT (OUTPATIENT)
Dept: INFUSION THERAPY | Facility: HOSPITAL | Age: 58
End: 2018-07-12

## 2018-07-12 ENCOUNTER — OUTPATIENT (OUTPATIENT)
Dept: OUTPATIENT SERVICES | Facility: HOSPITAL | Age: 58
LOS: 1 days | End: 2018-07-12
Payer: COMMERCIAL

## 2018-07-12 VITALS
RESPIRATION RATE: 18 BRPM | OXYGEN SATURATION: 95 % | DIASTOLIC BLOOD PRESSURE: 96 MMHG | SYSTOLIC BLOOD PRESSURE: 176 MMHG | TEMPERATURE: 97 F | HEART RATE: 88 BPM

## 2018-07-12 VITALS
HEIGHT: 70 IN | SYSTOLIC BLOOD PRESSURE: 153 MMHG | HEART RATE: 103 BPM | WEIGHT: 235.01 LBS | OXYGEN SATURATION: 95 % | TEMPERATURE: 98 F | RESPIRATION RATE: 18 BRPM | DIASTOLIC BLOOD PRESSURE: 99 MMHG

## 2018-07-12 DIAGNOSIS — Z90.49 ACQUIRED ABSENCE OF OTHER SPECIFIED PARTS OF DIGESTIVE TRACT: Chronic | ICD-10-CM

## 2018-07-12 DIAGNOSIS — C08.9 MALIGNANT NEOPLASM OF MAJOR SALIVARY GLAND, UNSPECIFIED: ICD-10-CM

## 2018-07-12 PROCEDURE — 96413 CHEMO IV INFUSION 1 HR: CPT

## 2018-07-12 PROCEDURE — 96415 CHEMO IV INFUSION ADDL HR: CPT

## 2018-07-12 PROCEDURE — 96361 HYDRATE IV INFUSION ADD-ON: CPT

## 2018-07-12 PROCEDURE — 96375 TX/PRO/DX INJ NEW DRUG ADDON: CPT

## 2018-07-12 RX ADMIN — Medication 212 MILLIGRAM(S): at 11:02

## 2018-07-12 RX ADMIN — SODIUM CHLORIDE 1000 MILLILITER(S): 9 INJECTION INTRAMUSCULAR; INTRAVENOUS; SUBCUTANEOUS at 11:04

## 2018-07-12 RX ADMIN — Medication 202 MILLIGRAM(S): at 11:02

## 2018-07-12 RX ADMIN — ONDANSETRON 232 MILLIGRAM(S): 8 TABLET, FILM COATED ORAL at 11:02

## 2018-07-12 RX ADMIN — CISPLATIN 393.33 MILLIGRAM(S): 1 INJECTION, SOLUTION INTRAVENOUS at 12:22

## 2018-07-17 VITALS
HEART RATE: 91 BPM | DIASTOLIC BLOOD PRESSURE: 99 MMHG | SYSTOLIC BLOOD PRESSURE: 153 MMHG | OXYGEN SATURATION: 96 % | BODY MASS INDEX: 33.98 KG/M2 | WEIGHT: 236.8 LBS | RESPIRATION RATE: 16 BRPM

## 2018-07-17 RX ORDER — LOSARTAN POTASSIUM 100 MG/1
100 TABLET, FILM COATED ORAL DAILY
Refills: 0 | Status: ACTIVE | COMMUNITY
Start: 2018-07-17

## 2018-07-20 ENCOUNTER — OUTPATIENT (OUTPATIENT)
Dept: OUTPATIENT SERVICES | Facility: HOSPITAL | Age: 58
LOS: 1 days | End: 2018-07-20
Payer: COMMERCIAL

## 2018-07-20 ENCOUNTER — APPOINTMENT (OUTPATIENT)
Dept: INFUSION THERAPY | Facility: HOSPITAL | Age: 58
End: 2018-07-20

## 2018-07-20 VITALS
HEART RATE: 100 BPM | TEMPERATURE: 98 F | DIASTOLIC BLOOD PRESSURE: 84 MMHG | OXYGEN SATURATION: 98 % | SYSTOLIC BLOOD PRESSURE: 127 MMHG | RESPIRATION RATE: 18 BRPM

## 2018-07-20 VITALS
TEMPERATURE: 97 F | OXYGEN SATURATION: 98 % | RESPIRATION RATE: 18 BRPM | DIASTOLIC BLOOD PRESSURE: 94 MMHG | HEART RATE: 84 BPM | SYSTOLIC BLOOD PRESSURE: 155 MMHG

## 2018-07-20 DIAGNOSIS — C08.9 MALIGNANT NEOPLASM OF MAJOR SALIVARY GLAND, UNSPECIFIED: ICD-10-CM

## 2018-07-20 DIAGNOSIS — E86.0 DEHYDRATION: ICD-10-CM

## 2018-07-20 DIAGNOSIS — Z90.49 ACQUIRED ABSENCE OF OTHER SPECIFIED PARTS OF DIGESTIVE TRACT: Chronic | ICD-10-CM

## 2018-07-20 DIAGNOSIS — R11.2 NAUSEA WITH VOMITING, UNSPECIFIED: ICD-10-CM

## 2018-07-20 PROCEDURE — 96375 TX/PRO/DX INJ NEW DRUG ADDON: CPT

## 2018-07-20 PROCEDURE — 96361 HYDRATE IV INFUSION ADD-ON: CPT

## 2018-07-20 PROCEDURE — 96413 CHEMO IV INFUSION 1 HR: CPT

## 2018-07-20 RX ORDER — SODIUM CHLORIDE 9 MG/ML
1000 INJECTION INTRAMUSCULAR; INTRAVENOUS; SUBCUTANEOUS ONCE
Qty: 0 | Refills: 0 | Status: COMPLETED | OUTPATIENT
Start: 2018-07-20 | End: 2018-07-20

## 2018-07-20 RX ORDER — DEXAMETHASONE 0.5 MG/5ML
12 ELIXIR ORAL ONCE
Qty: 0 | Refills: 0 | Status: COMPLETED | OUTPATIENT
Start: 2018-07-20 | End: 2018-07-20

## 2018-07-20 RX ORDER — CISPLATIN 1 MG/ML
90 INJECTION, SOLUTION INTRAVENOUS ONCE
Qty: 0 | Refills: 0 | Status: COMPLETED | OUTPATIENT
Start: 2018-07-20 | End: 2018-07-20

## 2018-07-20 RX ORDER — DIPHENHYDRAMINE HCL 50 MG
25 CAPSULE ORAL ONCE
Qty: 0 | Refills: 0 | Status: COMPLETED | OUTPATIENT
Start: 2018-07-20 | End: 2018-07-20

## 2018-07-20 RX ORDER — ONDANSETRON 8 MG/1
16 TABLET, FILM COATED ORAL ONCE
Qty: 0 | Refills: 0 | Status: COMPLETED | OUTPATIENT
Start: 2018-07-20 | End: 2018-07-20

## 2018-07-20 RX ADMIN — Medication 212 MILLIGRAM(S): at 13:33

## 2018-07-20 RX ADMIN — CISPLATIN 393.33 MILLIGRAM(S): 1 INJECTION, SOLUTION INTRAVENOUS at 13:34

## 2018-07-20 RX ADMIN — SODIUM CHLORIDE 1000 MILLILITER(S): 9 INJECTION INTRAMUSCULAR; INTRAVENOUS; SUBCUTANEOUS at 13:34

## 2018-07-20 RX ADMIN — Medication 202 MILLIGRAM(S): at 13:33

## 2018-07-20 RX ADMIN — ONDANSETRON 232 MILLIGRAM(S): 8 TABLET, FILM COATED ORAL at 13:34

## 2018-07-24 VITALS
WEIGHT: 235.6 LBS | BODY MASS INDEX: 33.81 KG/M2 | HEART RATE: 100 BPM | RESPIRATION RATE: 18 BRPM | OXYGEN SATURATION: 96 % | DIASTOLIC BLOOD PRESSURE: 97 MMHG | SYSTOLIC BLOOD PRESSURE: 147 MMHG

## 2018-07-24 VITALS — HEART RATE: 98 BPM | DIASTOLIC BLOOD PRESSURE: 92 MMHG | SYSTOLIC BLOOD PRESSURE: 141 MMHG

## 2018-07-27 ENCOUNTER — APPOINTMENT (OUTPATIENT)
Dept: INFUSION THERAPY | Facility: HOSPITAL | Age: 58
End: 2018-07-27

## 2018-07-27 ENCOUNTER — OUTPATIENT (OUTPATIENT)
Dept: OUTPATIENT SERVICES | Facility: HOSPITAL | Age: 58
LOS: 1 days | End: 2018-07-27
Payer: COMMERCIAL

## 2018-07-27 VITALS
SYSTOLIC BLOOD PRESSURE: 150 MMHG | TEMPERATURE: 99 F | HEART RATE: 91 BPM | RESPIRATION RATE: 18 BRPM | DIASTOLIC BLOOD PRESSURE: 98 MMHG | OXYGEN SATURATION: 99 %

## 2018-07-27 VITALS
SYSTOLIC BLOOD PRESSURE: 125 MMHG | WEIGHT: 235.01 LBS | OXYGEN SATURATION: 98 % | HEART RATE: 96 BPM | DIASTOLIC BLOOD PRESSURE: 80 MMHG | HEIGHT: 70 IN | TEMPERATURE: 99 F | RESPIRATION RATE: 18 BRPM

## 2018-07-27 DIAGNOSIS — Z90.49 ACQUIRED ABSENCE OF OTHER SPECIFIED PARTS OF DIGESTIVE TRACT: Chronic | ICD-10-CM

## 2018-07-27 DIAGNOSIS — R11.2 NAUSEA WITH VOMITING, UNSPECIFIED: ICD-10-CM

## 2018-07-27 DIAGNOSIS — E86.0 DEHYDRATION: ICD-10-CM

## 2018-07-27 DIAGNOSIS — C08.9 MALIGNANT NEOPLASM OF MAJOR SALIVARY GLAND, UNSPECIFIED: ICD-10-CM

## 2018-07-27 PROCEDURE — 96413 CHEMO IV INFUSION 1 HR: CPT

## 2018-07-27 PROCEDURE — 96361 HYDRATE IV INFUSION ADD-ON: CPT

## 2018-07-27 PROCEDURE — 82962 GLUCOSE BLOOD TEST: CPT

## 2018-07-27 PROCEDURE — 36415 COLL VENOUS BLD VENIPUNCTURE: CPT

## 2018-07-27 PROCEDURE — 80048 BASIC METABOLIC PNL TOTAL CA: CPT

## 2018-07-27 PROCEDURE — 96375 TX/PRO/DX INJ NEW DRUG ADDON: CPT

## 2018-07-27 RX ORDER — ONDANSETRON 8 MG/1
16 TABLET, FILM COATED ORAL ONCE
Qty: 0 | Refills: 0 | Status: COMPLETED | OUTPATIENT
Start: 2018-07-27 | End: 2018-07-27

## 2018-07-27 RX ORDER — DIPHENHYDRAMINE HCL 50 MG
25 CAPSULE ORAL ONCE
Qty: 0 | Refills: 0 | Status: COMPLETED | OUTPATIENT
Start: 2018-07-27 | End: 2018-07-27

## 2018-07-27 RX ORDER — SODIUM CHLORIDE 9 MG/ML
1000 INJECTION INTRAMUSCULAR; INTRAVENOUS; SUBCUTANEOUS ONCE
Qty: 0 | Refills: 0 | Status: COMPLETED | OUTPATIENT
Start: 2018-07-27 | End: 2018-07-27

## 2018-07-27 RX ORDER — DEXAMETHASONE 0.5 MG/5ML
12 ELIXIR ORAL ONCE
Qty: 0 | Refills: 0 | Status: COMPLETED | OUTPATIENT
Start: 2018-07-27 | End: 2018-07-27

## 2018-07-27 RX ORDER — CISPLATIN 1 MG/ML
90 INJECTION, SOLUTION INTRAVENOUS ONCE
Qty: 0 | Refills: 0 | Status: COMPLETED | OUTPATIENT
Start: 2018-07-27 | End: 2018-07-27

## 2018-07-27 RX ADMIN — CISPLATIN 393.33 MILLIGRAM(S): 1 INJECTION, SOLUTION INTRAVENOUS at 11:11

## 2018-07-27 RX ADMIN — ONDANSETRON 232 MILLIGRAM(S): 8 TABLET, FILM COATED ORAL at 11:11

## 2018-07-27 RX ADMIN — SODIUM CHLORIDE 1000 MILLILITER(S): 9 INJECTION INTRAMUSCULAR; INTRAVENOUS; SUBCUTANEOUS at 11:10

## 2018-07-27 RX ADMIN — Medication 212 MILLIGRAM(S): at 11:10

## 2018-07-27 RX ADMIN — Medication 202 MILLIGRAM(S): at 11:10

## 2018-07-31 VITALS — DIASTOLIC BLOOD PRESSURE: 78 MMHG | SYSTOLIC BLOOD PRESSURE: 136 MMHG | HEART RATE: 85 BPM

## 2018-07-31 VITALS
RESPIRATION RATE: 16 BRPM | OXYGEN SATURATION: 99 % | WEIGHT: 230 LBS | DIASTOLIC BLOOD PRESSURE: 90 MMHG | BODY MASS INDEX: 33 KG/M2 | SYSTOLIC BLOOD PRESSURE: 144 MMHG | HEART RATE: 78 BPM

## 2018-08-01 RX ORDER — FLUCONAZOLE 100 MG/1
100 TABLET ORAL
Qty: 15 | Refills: 0 | Status: DISCONTINUED | COMMUNITY
Start: 2018-07-17 | End: 2018-08-01

## 2018-08-03 ENCOUNTER — OUTPATIENT (OUTPATIENT)
Dept: OUTPATIENT SERVICES | Facility: HOSPITAL | Age: 58
LOS: 1 days | End: 2018-08-03
Payer: COMMERCIAL

## 2018-08-03 ENCOUNTER — APPOINTMENT (OUTPATIENT)
Dept: INFUSION THERAPY | Facility: HOSPITAL | Age: 58
End: 2018-08-03

## 2018-08-03 VITALS
DIASTOLIC BLOOD PRESSURE: 72 MMHG | HEART RATE: 81 BPM | OXYGEN SATURATION: 98 % | SYSTOLIC BLOOD PRESSURE: 110 MMHG | HEIGHT: 70 IN | RESPIRATION RATE: 18 BRPM | TEMPERATURE: 97 F | WEIGHT: 227.08 LBS

## 2018-08-03 DIAGNOSIS — C08.9 MALIGNANT NEOPLASM OF MAJOR SALIVARY GLAND, UNSPECIFIED: ICD-10-CM

## 2018-08-03 DIAGNOSIS — Z90.49 ACQUIRED ABSENCE OF OTHER SPECIFIED PARTS OF DIGESTIVE TRACT: Chronic | ICD-10-CM

## 2018-08-03 PROCEDURE — 96415 CHEMO IV INFUSION ADDL HR: CPT

## 2018-08-03 PROCEDURE — 96413 CHEMO IV INFUSION 1 HR: CPT

## 2018-08-03 PROCEDURE — 96375 TX/PRO/DX INJ NEW DRUG ADDON: CPT

## 2018-08-03 PROCEDURE — 96361 HYDRATE IV INFUSION ADD-ON: CPT

## 2018-08-03 RX ORDER — ONDANSETRON 8 MG/1
16 TABLET, FILM COATED ORAL ONCE
Qty: 0 | Refills: 0 | Status: COMPLETED | OUTPATIENT
Start: 2018-08-03 | End: 2018-08-03

## 2018-08-03 RX ORDER — SODIUM CHLORIDE 9 MG/ML
1000 INJECTION INTRAMUSCULAR; INTRAVENOUS; SUBCUTANEOUS ONCE
Qty: 0 | Refills: 0 | Status: COMPLETED | OUTPATIENT
Start: 2018-08-03 | End: 2018-08-03

## 2018-08-03 RX ORDER — DEXAMETHASONE 0.5 MG/5ML
12 ELIXIR ORAL ONCE
Qty: 0 | Refills: 0 | Status: COMPLETED | OUTPATIENT
Start: 2018-08-03 | End: 2018-08-03

## 2018-08-03 RX ORDER — DIPHENHYDRAMINE HCL 50 MG
25 CAPSULE ORAL ONCE
Qty: 0 | Refills: 0 | Status: COMPLETED | OUTPATIENT
Start: 2018-08-03 | End: 2018-08-03

## 2018-08-03 RX ORDER — CISPLATIN 1 MG/ML
90 INJECTION, SOLUTION INTRAVENOUS ONCE
Qty: 0 | Refills: 0 | Status: COMPLETED | OUTPATIENT
Start: 2018-08-03 | End: 2018-08-03

## 2018-08-03 RX ADMIN — Medication 212 MILLIGRAM(S): at 12:13

## 2018-08-03 RX ADMIN — ONDANSETRON 232 MILLIGRAM(S): 8 TABLET, FILM COATED ORAL at 12:13

## 2018-08-03 RX ADMIN — CISPLATIN 393.33 MILLIGRAM(S): 1 INJECTION, SOLUTION INTRAVENOUS at 12:53

## 2018-08-03 RX ADMIN — Medication 202 MILLIGRAM(S): at 12:12

## 2018-08-03 RX ADMIN — SODIUM CHLORIDE 1000 MILLILITER(S): 9 INJECTION INTRAMUSCULAR; INTRAVENOUS; SUBCUTANEOUS at 12:13

## 2018-08-07 VITALS
WEIGHT: 229.7 LBS | OXYGEN SATURATION: 97 % | HEART RATE: 97 BPM | BODY MASS INDEX: 32.96 KG/M2 | RESPIRATION RATE: 16 BRPM | DIASTOLIC BLOOD PRESSURE: 73 MMHG | SYSTOLIC BLOOD PRESSURE: 113 MMHG

## 2018-08-07 VITALS — DIASTOLIC BLOOD PRESSURE: 63 MMHG | HEART RATE: 103 BPM | SYSTOLIC BLOOD PRESSURE: 96 MMHG

## 2018-08-09 ENCOUNTER — APPOINTMENT (OUTPATIENT)
Dept: INFUSION THERAPY | Facility: HOSPITAL | Age: 58
End: 2018-08-09

## 2018-08-09 ENCOUNTER — OUTPATIENT (OUTPATIENT)
Dept: OUTPATIENT SERVICES | Facility: HOSPITAL | Age: 58
LOS: 1 days | End: 2018-08-09
Payer: COMMERCIAL

## 2018-08-09 VITALS
OXYGEN SATURATION: 96 % | WEIGHT: 229.94 LBS | DIASTOLIC BLOOD PRESSURE: 90 MMHG | HEIGHT: 70 IN | SYSTOLIC BLOOD PRESSURE: 148 MMHG | TEMPERATURE: 98 F | RESPIRATION RATE: 18 BRPM | HEART RATE: 94 BPM

## 2018-08-09 DIAGNOSIS — Z90.49 ACQUIRED ABSENCE OF OTHER SPECIFIED PARTS OF DIGESTIVE TRACT: Chronic | ICD-10-CM

## 2018-08-09 DIAGNOSIS — C08.9 MALIGNANT NEOPLASM OF MAJOR SALIVARY GLAND, UNSPECIFIED: ICD-10-CM

## 2018-08-09 PROCEDURE — 96361 HYDRATE IV INFUSION ADD-ON: CPT

## 2018-08-09 PROCEDURE — 96375 TX/PRO/DX INJ NEW DRUG ADDON: CPT

## 2018-08-09 PROCEDURE — 96413 CHEMO IV INFUSION 1 HR: CPT

## 2018-08-09 PROCEDURE — 96415 CHEMO IV INFUSION ADDL HR: CPT

## 2018-08-09 RX ORDER — SODIUM CHLORIDE 9 MG/ML
1000 INJECTION INTRAMUSCULAR; INTRAVENOUS; SUBCUTANEOUS ONCE
Qty: 0 | Refills: 0 | Status: COMPLETED | OUTPATIENT
Start: 2018-08-09 | End: 2018-08-09

## 2018-08-09 RX ORDER — ONDANSETRON 8 MG/1
16 TABLET, FILM COATED ORAL ONCE
Qty: 0 | Refills: 0 | Status: COMPLETED | OUTPATIENT
Start: 2018-08-09 | End: 2018-08-09

## 2018-08-09 RX ORDER — DEXAMETHASONE 0.5 MG/5ML
12 ELIXIR ORAL ONCE
Qty: 0 | Refills: 0 | Status: COMPLETED | OUTPATIENT
Start: 2018-08-09 | End: 2018-08-09

## 2018-08-09 RX ORDER — DIPHENHYDRAMINE HCL 50 MG
25 CAPSULE ORAL ONCE
Qty: 0 | Refills: 0 | Status: COMPLETED | OUTPATIENT
Start: 2018-08-09 | End: 2018-08-09

## 2018-08-09 RX ORDER — CISPLATIN 1 MG/ML
90 INJECTION, SOLUTION INTRAVENOUS ONCE
Qty: 0 | Refills: 0 | Status: COMPLETED | OUTPATIENT
Start: 2018-08-09 | End: 2018-08-09

## 2018-08-09 RX ADMIN — Medication 202 MILLIGRAM(S): at 12:16

## 2018-08-09 RX ADMIN — Medication 212 MILLIGRAM(S): at 12:18

## 2018-08-09 RX ADMIN — ONDANSETRON 232 MILLIGRAM(S): 8 TABLET, FILM COATED ORAL at 12:17

## 2018-08-09 RX ADMIN — SODIUM CHLORIDE 1000 MILLILITER(S): 9 INJECTION INTRAMUSCULAR; INTRAVENOUS; SUBCUTANEOUS at 13:05

## 2018-08-09 RX ADMIN — CISPLATIN 393.33 MILLIGRAM(S): 1 INJECTION, SOLUTION INTRAVENOUS at 13:00

## 2018-08-17 ENCOUNTER — APPOINTMENT (OUTPATIENT)
Dept: INFUSION THERAPY | Facility: HOSPITAL | Age: 58
End: 2018-08-17

## 2018-08-20 DIAGNOSIS — R11.2 NAUSEA WITH VOMITING, UNSPECIFIED: ICD-10-CM

## 2018-08-20 DIAGNOSIS — E86.0 DEHYDRATION: ICD-10-CM

## 2018-08-24 ENCOUNTER — APPOINTMENT (OUTPATIENT)
Dept: INFUSION THERAPY | Facility: HOSPITAL | Age: 58
End: 2018-08-24

## 2018-08-27 ENCOUNTER — APPOINTMENT (OUTPATIENT)
Dept: OTOLARYNGOLOGY | Facility: CLINIC | Age: 58
End: 2018-08-27
Payer: MEDICAID

## 2018-08-27 VITALS
TEMPERATURE: 98.3 F | OXYGEN SATURATION: 95 % | SYSTOLIC BLOOD PRESSURE: 163 MMHG | HEART RATE: 111 BPM | BODY MASS INDEX: 31.92 KG/M2 | DIASTOLIC BLOOD PRESSURE: 100 MMHG | WEIGHT: 223 LBS | HEIGHT: 70 IN

## 2018-08-27 PROCEDURE — 31237 NSL/SINS NDSC SURG BX POLYPC: CPT | Mod: RT

## 2018-08-27 PROCEDURE — 99214 OFFICE O/P EST MOD 30 MIN: CPT | Mod: 25

## 2018-08-29 ENCOUNTER — FORM ENCOUNTER (OUTPATIENT)
Age: 58
End: 2018-08-29

## 2018-08-30 ENCOUNTER — OUTPATIENT (OUTPATIENT)
Dept: OUTPATIENT SERVICES | Facility: HOSPITAL | Age: 58
LOS: 1 days | End: 2018-08-30
Payer: COMMERCIAL

## 2018-08-30 ENCOUNTER — APPOINTMENT (OUTPATIENT)
Dept: CT IMAGING | Facility: HOSPITAL | Age: 58
End: 2018-08-30
Payer: MEDICAID

## 2018-08-30 DIAGNOSIS — Z90.49 ACQUIRED ABSENCE OF OTHER SPECIFIED PARTS OF DIGESTIVE TRACT: Chronic | ICD-10-CM

## 2018-08-30 PROCEDURE — 70486 CT MAXILLOFACIAL W/O DYE: CPT

## 2018-08-30 PROCEDURE — 70486 CT MAXILLOFACIAL W/O DYE: CPT | Mod: 26

## 2018-08-31 ENCOUNTER — APPOINTMENT (OUTPATIENT)
Dept: INFUSION THERAPY | Facility: HOSPITAL | Age: 58
End: 2018-08-31

## 2018-09-13 ENCOUNTER — APPOINTMENT (OUTPATIENT)
Dept: OTOLARYNGOLOGY | Facility: CLINIC | Age: 58
End: 2018-09-13
Payer: MEDICAID

## 2018-09-13 ENCOUNTER — APPOINTMENT (OUTPATIENT)
Dept: RADIATION ONCOLOGY | Facility: CLINIC | Age: 58
End: 2018-09-13
Payer: SELF-PAY

## 2018-09-13 VITALS
BODY MASS INDEX: 31.21 KG/M2 | HEIGHT: 70 IN | SYSTOLIC BLOOD PRESSURE: 137 MMHG | DIASTOLIC BLOOD PRESSURE: 93 MMHG | HEART RATE: 90 BPM | WEIGHT: 218 LBS | TEMPERATURE: 98.8 F

## 2018-09-13 VITALS
OXYGEN SATURATION: 98 % | DIASTOLIC BLOOD PRESSURE: 105 MMHG | BODY MASS INDEX: 31.37 KG/M2 | SYSTOLIC BLOOD PRESSURE: 126 MMHG | WEIGHT: 218.6 LBS | RESPIRATION RATE: 16 BRPM | HEART RATE: 106 BPM

## 2018-09-13 PROCEDURE — 99024 POSTOP FOLLOW-UP VISIT: CPT

## 2018-09-13 PROCEDURE — 31237 NSL/SINS NDSC SURG BX POLYPC: CPT | Mod: 50

## 2018-09-13 PROCEDURE — 99214 OFFICE O/P EST MOD 30 MIN: CPT | Mod: 25

## 2018-09-13 RX ORDER — DEXAMETHASONE 2 MG/1
2 TABLET ORAL DAILY
Qty: 30 | Refills: 0 | Status: DISCONTINUED | COMMUNITY
Start: 2018-06-08 | End: 2018-09-13

## 2018-09-13 RX ORDER — DEXAMETHASONE 1 MG/1
1 TABLET ORAL
Qty: 4 | Refills: 0 | Status: DISCONTINUED | COMMUNITY
Start: 2018-08-07 | End: 2018-09-13

## 2018-09-13 RX ORDER — LIDOCAINE HYDROCHLORIDE 20 MG/ML
2 SOLUTION OROPHARYNGEAL
Qty: 1 | Refills: 5 | Status: DISCONTINUED | COMMUNITY
Start: 2018-07-10 | End: 2018-09-13

## 2018-09-13 NOTE — REASON FOR VISIT
[Head and Neck Cancer] : head and neck cancer [Post-Treatment Evaluation] : post-treatment evaluation for

## 2018-09-13 NOTE — HISTORY OF PRESENT ILLNESS
[FreeTextEntry1] : Mr. Sinha has completed radiation therapy to the skull base/ sinus for a total of 6000 cGy/ 30 fractions from 6/26/18 to 8/9/18. He had an uncomplicated treatment course. Of note, he did have significant hyperglycemia that required hospital admission for management. He also had poor blood pressure control. \par \par [place]  # [id number]\par 9/13/18- PTE\[facility] CT maxillofacial on 8/30/18 showed extensive opacification of the sinonasal cavity and bilateral mastoid air cells.\par \[facility] Today, he notes decreased appetite due to nausea, dysgeusia and phlegm. His main complaint is being unable to breathe through his nose. He has used saline sprays in the past without relief. Denies pain or dysphagia. His vision has improved and his right eye moves much better. Has appt. with Dr. Stewart today. Saw PMD last week.\[facility] Has a follow up appointment today with Dr. Stewart.\par \par \[id number], [id number]\par Mr. 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 He presented with dizziness, vomiting, blurry vision of Left eye to [facility] in early May.  Imaging showed sellar lesion, extending into sphenoid cavernous sinus prepontine regions.  He was transferred to [facility] on 05/10 for biopsy. CT sinuses 5/13/18  showed destructive central skull base mass.\par \[facility] MR sella 5/13/18 showed showed destructive central skull base components within the clivus,  the bilateral sphenoid sinuses and the bilateral pterygopalatine fossae.  The sella floor appears intact on concurrent CT imaging, suggesting extra  pituitary origin. 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  \par par On 5/17/18 he underwent  biopsies of his anterior skull  and skull base lesions by Dr Stewatr. Pathology of anterior skull lesions showed infiltrating carcinoma, favoring adenoid cystic.  Sellar and anterior lesions positive for adenoid cystic carcinoma with neural invasion. immunostains support adenoid cystic carcinoma. Positive for , SMActin and p63.\par \par His case was reviewed in multidisciplinary tumor board with recommendation for radiation therapy, possibly with concurrent chemotherapy. \par \par At the time of initial consult he c/o of a left sided headache 5/10 and fatigue. He has double vision on left gaze. He also had right-sided tinnitus. Denied throat pain or dysphagia.\par \par Notably, he presented to to the ED at [facility] with dizziness and vomiting. He was treated with antihypertensive medication and d/c home. He notes blurry vision in left eye. He recently saw eye doctor 6/1/18 and diagnosed with glaucoma and ordered to use eye gtts. \par \par 6/8/18 Mr. Roblse presents today for follow up. [place]  #[id number]\par He underwent CT simulation for radiation therapy today, which he tolerated well. He notes pain 5/10 to his head at most times. He takes ibuprofen 600 mg QHS, without much relief. \par He underwent a PET scan yesterday. No evidence of distant metastasis. He underwent an MRI for further characterization of his disease site as well. Final reports of both scans are pending. He saw Med Onc Dr. Link Meneses and plan is for concurrent weekly chemotherapy. \par \par 6/21/18- Mr. Sinha presents today for follow up. Today he is feeling well. Notes headaches have improved after starting dexamethasone 2mg daily.  Still with visual deficits. \par He is here to discuss again the rationale for treatment since he thinks his oncologist told him the disease would worsen after treatment. \par

## 2018-09-13 NOTE — PHYSICAL EXAM
[Skin Color & Pigmentation] : normal skin color and pigmentation [Oriented To Time, Place, And Person] : oriented to person, place, and time [Normal] : no joint swelling, no clubbing or cyanosis of the fingernails and muscle strength and tone were normal [de-identified] : improved EOM [de-identified] : grade 1 mucositis of palate is mostly resolved.  [de-identified] : alopecia [de-identified] : improved right lateral rectus palsy

## 2018-09-13 NOTE — REVIEW OF SYSTEMS
[Constipation: Grade 1 - Occasional or intermittent symptoms; occasional use of stool softeners, laxatives, dietary modification, or enema] : Constipation: Grade 1 - Occasional or intermittent symptoms; occasional use of stool softeners, laxatives, dietary modification, or enema [Fatigue: Grade 2 - Fatigue not relieved by rest; limiting instrumental ADL] : Fatigue: Grade 2 - Fatigue not relieved by rest; limiting instrumental ADL [Blurred Vision: Grade 0] : Blurred Vision: Grade 0 [Cognitive Disturbance: Grade 0] : Cognitive Disturbance: Grade 0 [Dizziness: Grade 0] : Dizziness: Grade 0  [Headache: Grade 0] : Headache: Grade 0 [Dermatitis Radiation: Grade 0] : Dermatitis Radiation: Grade 0 [Dysphagia: Grade 0] : Dysphagia: Grade 0 [Esophagitis: Grade 0] : Esophagitis: Grade 0 [Mucositis Oral: Grade 1 - Asymptomatic or mild symptoms; intervention not indicated] : Mucositis Oral: Grade 1 - Asymptomatic or mild symptoms; intervention not indicated [Xerostomia: Grade 1 - Symptomatic (e.g., dry or thick saliva) without significant dietary alteration; unstimulated saliva flow >0.2 ml/min] : Xerostomia: Grade 1 - Symptomatic (e.g., dry or thick saliva) without significant dietary alteration; unstimulated saliva flow >0.2 ml/min [Oral Pain: Grade 1 - Mild pain] : Oral Pain: Grade 1 - Mild pain

## 2018-09-13 NOTE — ASSESSMENT
[Work-up necessary to assess local, regional or metastatic recurrence] : Work-up necessary to assess local, regional or metastatic recurrence

## 2018-10-02 DIAGNOSIS — Z01.818 ENCOUNTER FOR OTHER PREPROCEDURAL EXAMINATION: ICD-10-CM

## 2018-10-05 ENCOUNTER — APPOINTMENT (OUTPATIENT)
Dept: SURGERY | Facility: CLINIC | Age: 58
End: 2018-10-05

## 2018-10-05 ENCOUNTER — OUTPATIENT (OUTPATIENT)
Dept: OUTPATIENT SERVICES | Facility: HOSPITAL | Age: 58
LOS: 1 days | End: 2018-10-05
Payer: MEDICAID

## 2018-10-05 VITALS
SYSTOLIC BLOOD PRESSURE: 125 MMHG | DIASTOLIC BLOOD PRESSURE: 90 MMHG | TEMPERATURE: 98 F | HEIGHT: 70 IN | HEART RATE: 88 BPM | OXYGEN SATURATION: 97 % | WEIGHT: 221.34 LBS | RESPIRATION RATE: 17 BRPM

## 2018-10-05 DIAGNOSIS — Z01.818 ENCOUNTER FOR OTHER PREPROCEDURAL EXAMINATION: ICD-10-CM

## 2018-10-05 DIAGNOSIS — J32.8 OTHER CHRONIC SINUSITIS: ICD-10-CM

## 2018-10-05 DIAGNOSIS — Z90.49 ACQUIRED ABSENCE OF OTHER SPECIFIED PARTS OF DIGESTIVE TRACT: Chronic | ICD-10-CM

## 2018-10-05 DIAGNOSIS — J34.89 OTHER SPECIFIED DISORDERS OF NOSE AND NASAL SINUSES: ICD-10-CM

## 2018-10-05 LAB
ALBUMIN SERPL ELPH-MCNC: 3.4 G/DL — SIGNIFICANT CHANGE UP (ref 3.3–5)
ALP SERPL-CCNC: 86 U/L — SIGNIFICANT CHANGE UP (ref 40–120)
ALT FLD-CCNC: 10 U/L — SIGNIFICANT CHANGE UP (ref 10–45)
ANION GAP SERPL CALC-SCNC: 11 MMOL/L — SIGNIFICANT CHANGE UP (ref 5–17)
APPEARANCE UR: CLEAR — SIGNIFICANT CHANGE UP
APTT BLD: 31.6 SEC — SIGNIFICANT CHANGE UP (ref 27.5–37.4)
AST SERPL-CCNC: 24 U/L — SIGNIFICANT CHANGE UP (ref 10–40)
BASOPHILS NFR BLD AUTO: 0.3 % — SIGNIFICANT CHANGE UP (ref 0–2)
BILIRUB SERPL-MCNC: 0.7 MG/DL — SIGNIFICANT CHANGE UP (ref 0.2–1.2)
BILIRUB UR-MCNC: NEGATIVE — SIGNIFICANT CHANGE UP
BUN SERPL-MCNC: 12 MG/DL — SIGNIFICANT CHANGE UP (ref 7–23)
CALCIUM SERPL-MCNC: 9.7 MG/DL — SIGNIFICANT CHANGE UP (ref 8.4–10.5)
CHLORIDE SERPL-SCNC: 100 MMOL/L — SIGNIFICANT CHANGE UP (ref 96–108)
CO2 SERPL-SCNC: 29 MMOL/L — SIGNIFICANT CHANGE UP (ref 22–31)
COLOR SPEC: YELLOW — SIGNIFICANT CHANGE UP
CREAT SERPL-MCNC: 2.11 MG/DL — HIGH (ref 0.5–1.3)
DIFF PNL FLD: NEGATIVE — SIGNIFICANT CHANGE UP
EOSINOPHIL NFR BLD AUTO: 2.2 % — SIGNIFICANT CHANGE UP (ref 0–6)
GLUCOSE SERPL-MCNC: 95 MG/DL — SIGNIFICANT CHANGE UP (ref 70–99)
GLUCOSE UR QL: NEGATIVE — SIGNIFICANT CHANGE UP
HCT VFR BLD CALC: 34.2 % — LOW (ref 39–50)
HGB BLD-MCNC: 11 G/DL — LOW (ref 13–17)
INR BLD: 1.09 — SIGNIFICANT CHANGE UP (ref 0.88–1.16)
KETONES UR-MCNC: NEGATIVE — SIGNIFICANT CHANGE UP
LEUKOCYTE ESTERASE UR-ACNC: NEGATIVE — SIGNIFICANT CHANGE UP
LYMPHOCYTES # BLD AUTO: 50.1 % — HIGH (ref 13–44)
MCHC RBC-ENTMCNC: 32.2 G/DL — SIGNIFICANT CHANGE UP (ref 32–36)
MCHC RBC-ENTMCNC: 33 PG — SIGNIFICANT CHANGE UP (ref 27–34)
MCV RBC AUTO: 102.7 FL — HIGH (ref 80–100)
MONOCYTES NFR BLD AUTO: 8.2 % — SIGNIFICANT CHANGE UP (ref 2–14)
NEUTROPHILS NFR BLD AUTO: 39.2 % — LOW (ref 43–77)
NITRITE UR-MCNC: NEGATIVE — SIGNIFICANT CHANGE UP
PH UR: 6 — SIGNIFICANT CHANGE UP (ref 5–8)
PLATELET # BLD AUTO: 202 K/UL — SIGNIFICANT CHANGE UP (ref 150–400)
POTASSIUM SERPL-MCNC: 4.2 MMOL/L — SIGNIFICANT CHANGE UP (ref 3.5–5.3)
POTASSIUM SERPL-SCNC: 4.2 MMOL/L — SIGNIFICANT CHANGE UP (ref 3.5–5.3)
PROT SERPL-MCNC: 7.3 G/DL — SIGNIFICANT CHANGE UP (ref 6–8.3)
PROT UR-MCNC: NEGATIVE MG/DL — SIGNIFICANT CHANGE UP
PROTHROM AB SERPL-ACNC: 12.1 SEC — SIGNIFICANT CHANGE UP (ref 9.8–12.7)
RBC # BLD: 3.33 M/UL — LOW (ref 4.2–5.8)
RBC # FLD: 13.4 % — SIGNIFICANT CHANGE UP (ref 10.3–16.9)
SODIUM SERPL-SCNC: 140 MMOL/L — SIGNIFICANT CHANGE UP (ref 135–145)
SP GR SPEC: 1.02 — SIGNIFICANT CHANGE UP (ref 1–1.03)
UROBILINOGEN FLD QL: 0.2 E.U./DL — SIGNIFICANT CHANGE UP
WBC # BLD: 7.2 K/UL — SIGNIFICANT CHANGE UP (ref 3.8–10.5)
WBC # FLD AUTO: 7.2 K/UL — SIGNIFICANT CHANGE UP (ref 3.8–10.5)

## 2018-10-05 PROCEDURE — 93010 ELECTROCARDIOGRAM REPORT: CPT

## 2018-10-05 RX ORDER — OMEPRAZOLE 10 MG/1
40 CAPSULE, DELAYED RELEASE ORAL
Qty: 0 | Refills: 0 | COMMUNITY

## 2018-10-05 NOTE — H&P PST ADULT - ASSESSMENT
No medical contraindication to endoscopic, endonasal CT guided, lysis of adhesions; bilateral maxillary antrostomies; bilateral ethmoidectomies; sphenoidectomies, pending normal labs.

## 2018-10-05 NOTE — H&P PST ADULT - FAMILY HISTORY
Mother  Still living? No  Type 2 diabetes mellitus, Age at diagnosis: Age Unknown     Sibling  Still living? Yes, Estimated age: Age Unknown  Hypertension, Age at diagnosis: Age Unknown

## 2018-10-05 NOTE — H&P PST ADULT - REASON FOR ADMISSION
Patinet is to undergo endoscopic, endonasal CT guided lysis of adhesions; bilateral maxillary antrostomies; bilateral ethmoidectomies; sphenoidectomies.

## 2018-10-06 LAB
CULTURE RESULTS: NO GROWTH — SIGNIFICANT CHANGE UP
SPECIMEN SOURCE: SIGNIFICANT CHANGE UP

## 2018-10-09 ENCOUNTER — APPOINTMENT (OUTPATIENT)
Dept: OTOLARYNGOLOGY | Facility: HOSPITAL | Age: 58
End: 2018-10-09

## 2018-10-09 ENCOUNTER — RESULT REVIEW (OUTPATIENT)
Age: 58
End: 2018-10-09

## 2018-10-09 ENCOUNTER — OUTPATIENT (OUTPATIENT)
Dept: OUTPATIENT SERVICES | Facility: HOSPITAL | Age: 58
LOS: 1 days | Discharge: HOME | End: 2018-10-09
Payer: MEDICAID

## 2018-10-09 VITALS
SYSTOLIC BLOOD PRESSURE: 124 MMHG | HEART RATE: 103 BPM | WEIGHT: 228.4 LBS | RESPIRATION RATE: 16 BRPM | HEIGHT: 70 IN | TEMPERATURE: 97 F | OXYGEN SATURATION: 99 % | DIASTOLIC BLOOD PRESSURE: 77 MMHG

## 2018-10-09 DIAGNOSIS — Z90.49 ACQUIRED ABSENCE OF OTHER SPECIFIED PARTS OF DIGESTIVE TRACT: Chronic | ICD-10-CM

## 2018-10-09 LAB — GLUCOSE BLDC GLUCOMTR-MCNC: 103 MG/DL — HIGH (ref 70–99)

## 2018-10-09 PROCEDURE — 61782 SCAN PROC CRANIAL EXTRA: CPT

## 2018-10-09 PROCEDURE — 31254 NSL/SINS NDSC W/PRTL ETHMDCT: CPT | Mod: 50

## 2018-10-09 PROCEDURE — 31256 EXPLORATION MAXILLARY SINUS: CPT | Mod: 50

## 2018-10-09 PROCEDURE — 31287 NASAL/SINUS ENDOSCOPY SURG: CPT | Mod: 50

## 2018-10-09 PROCEDURE — 30560 LYSIS INTRANASAL SYNECHIA: CPT

## 2018-10-09 RX ORDER — ACETAMINOPHEN 500 MG
650 TABLET ORAL EVERY 6 HOURS
Qty: 0 | Refills: 0 | Status: DISCONTINUED | OUTPATIENT
Start: 2018-10-09 | End: 2018-10-10

## 2018-10-09 RX ORDER — ONDANSETRON 8 MG/1
4 TABLET, FILM COATED ORAL EVERY 6 HOURS
Qty: 0 | Refills: 0 | Status: DISCONTINUED | OUTPATIENT
Start: 2018-10-09 | End: 2018-10-10

## 2018-10-09 RX ORDER — OXYMETAZOLINE HYDROCHLORIDE 0.5 MG/ML
3 SPRAY NASAL EVERY 12 HOURS
Qty: 0 | Refills: 0 | Status: DISCONTINUED | OUTPATIENT
Start: 2018-10-09 | End: 2018-10-10

## 2018-10-09 RX ORDER — MORPHINE SULFATE 50 MG/1
2 CAPSULE, EXTENDED RELEASE ORAL
Qty: 0 | Refills: 0 | Status: DISCONTINUED | OUTPATIENT
Start: 2018-10-09 | End: 2018-10-09

## 2018-10-09 RX ORDER — LABETALOL HCL 100 MG
100 TABLET ORAL ONCE
Qty: 0 | Refills: 0 | Status: COMPLETED | OUTPATIENT
Start: 2018-10-09 | End: 2018-10-09

## 2018-10-09 RX ORDER — LOSARTAN POTASSIUM 100 MG/1
100 TABLET, FILM COATED ORAL DAILY
Qty: 0 | Refills: 0 | Status: DISCONTINUED | OUTPATIENT
Start: 2018-10-09 | End: 2018-10-10

## 2018-10-09 RX ORDER — MORPHINE SULFATE 50 MG/1
2 CAPSULE, EXTENDED RELEASE ORAL
Qty: 0 | Refills: 0 | Status: DISCONTINUED | OUTPATIENT
Start: 2018-10-09 | End: 2018-10-10

## 2018-10-09 RX ORDER — SODIUM CHLORIDE 9 MG/ML
1000 INJECTION, SOLUTION INTRAVENOUS
Qty: 0 | Refills: 0 | Status: DISCONTINUED | OUTPATIENT
Start: 2018-10-09 | End: 2018-10-10

## 2018-10-09 RX ORDER — OXYCODONE AND ACETAMINOPHEN 5; 325 MG/1; MG/1
1 TABLET ORAL EVERY 4 HOURS
Qty: 0 | Refills: 0 | Status: DISCONTINUED | OUTPATIENT
Start: 2018-10-09 | End: 2018-10-10

## 2018-10-09 RX ORDER — OXYCODONE AND ACETAMINOPHEN 5; 325 MG/1; MG/1
2 TABLET ORAL EVERY 6 HOURS
Qty: 0 | Refills: 0 | Status: DISCONTINUED | OUTPATIENT
Start: 2018-10-09 | End: 2018-10-10

## 2018-10-09 RX ADMIN — SODIUM CHLORIDE 70 MILLILITER(S): 9 INJECTION, SOLUTION INTRAVENOUS at 23:14

## 2018-10-09 RX ADMIN — Medication 1 DROP(S): at 23:14

## 2018-10-09 RX ADMIN — Medication 300 MILLIGRAM(S): at 23:14

## 2018-10-09 RX ADMIN — MORPHINE SULFATE 2 MILLIGRAM(S): 50 CAPSULE, EXTENDED RELEASE ORAL at 16:46

## 2018-10-09 RX ADMIN — Medication 100 MILLIGRAM(S): at 18:24

## 2018-10-09 RX ADMIN — OXYMETAZOLINE HYDROCHLORIDE 3 SPRAY(S): 0.5 SPRAY NASAL at 20:31

## 2018-10-09 RX ADMIN — MORPHINE SULFATE 2 MILLIGRAM(S): 50 CAPSULE, EXTENDED RELEASE ORAL at 20:01

## 2018-10-09 NOTE — BRIEF OPERATIVE NOTE - PROCEDURE
<<-----Click on this checkbox to enter Procedure Endoscopic sinus surgery  10/09/2018  bilateral sphenoid, ethmoid and maxillary sinus surgery. +lysis of adhesions  Active  DGARBER

## 2018-10-09 NOTE — BRIEF OPERATIVE NOTE - OPERATION/FINDINGS
significant adhesions bilaterally from inferior turb to septum and middle turb to septum. purulence in bilateral ethmoids and maxillaries.

## 2018-10-10 VITALS — DIASTOLIC BLOOD PRESSURE: 93 MMHG | SYSTOLIC BLOOD PRESSURE: 154 MMHG | HEART RATE: 90 BPM | TEMPERATURE: 98 F

## 2018-10-10 PROCEDURE — 31257 NSL/SINS NDSC TOT W/SPHENDT: CPT | Mod: 50

## 2018-10-10 PROCEDURE — 88312 SPECIAL STAINS GROUP 1: CPT

## 2018-10-10 PROCEDURE — 88304 TISSUE EXAM BY PATHOLOGIST: CPT

## 2018-10-10 PROCEDURE — 61782 SCAN PROC CRANIAL EXTRA: CPT

## 2018-10-10 PROCEDURE — 31256 EXPLORATION MAXILLARY SINUS: CPT | Mod: 50

## 2018-10-10 PROCEDURE — 82962 GLUCOSE BLOOD TEST: CPT

## 2018-10-10 PROCEDURE — 88311 DECALCIFY TISSUE: CPT

## 2018-10-10 PROCEDURE — C9399: CPT

## 2018-10-10 RX ADMIN — LOSARTAN POTASSIUM 100 MILLIGRAM(S): 100 TABLET, FILM COATED ORAL at 06:58

## 2018-10-10 RX ADMIN — SODIUM CHLORIDE 70 MILLILITER(S): 9 INJECTION, SOLUTION INTRAVENOUS at 07:01

## 2018-10-10 RX ADMIN — OXYMETAZOLINE HYDROCHLORIDE 3 SPRAY(S): 0.5 SPRAY NASAL at 06:58

## 2018-10-10 RX ADMIN — Medication 300 MILLIGRAM(S): at 07:18

## 2018-10-10 RX ADMIN — Medication 1 DROP(S): at 06:58

## 2018-10-10 NOTE — PROGRESS NOTE ADULT - SUBJECTIVE AND OBJECTIVE BOX
ENT Progress Note    HPI: 58M s/p FESS yesterday, tolerated procedure well. AFVSS this AM. Merocel removed prior to discharge.      PAST MEDICAL & SURGICAL HISTORY:  Diabetes  HTN (hypertension)  History of cholecystectomy    Allergies    penicillin (Rash)    Intolerances      MEDICATIONS  (STANDING):  artificial tears (preservative free) Ophthalmic Solution 1 Drop(s) Both EYES three times a day  clindamycin   Capsule 300 milliGRAM(s) Oral every 8 hours  lactated ringers. 1000 milliLiter(s) (70 mL/Hr) IV Continuous <Continuous>  losartan 100 milliGRAM(s) Oral daily  oxymetazoline 0.05% Nasal Spray 3 Spray(s) Both Nostrils every 12 hours    MEDICATIONS  (PRN):  acetaminophen   Tablet .. 650 milliGRAM(s) Oral every 6 hours PRN Temp greater or equal to 38C (100.4F), Mild Pain (1 - 3)  morphine  - Injectable 2 milliGRAM(s) IV Push every 1 hour PRN Severe Pain (7 - 10)  ondansetron Injectable 4 milliGRAM(s) IV Push every 6 hours PRN Nausea  oxyCODONE    5 mG/acetaminophen 325 mG 1 Tablet(s) Oral every 4 hours PRN Moderate Pain (4 - 6)  oxyCODONE    5 mG/acetaminophen 325 mG 2 Tablet(s) Oral every 6 hours PRN Severe Pain (7 - 10)        Vital Signs Last 24 Hrs  T(C): 36.5 (10 Oct 2018 05:39), Max: 36.5 (10 Oct 2018 05:39)  T(F): 97.7 (10 Oct 2018 05:39), Max: 97.7 (10 Oct 2018 05:39)  HR: 90 (10 Oct 2018 05:39) (80 - 103)  BP: 154/93 (10 Oct 2018 05:39) (124/77 - 177/98)  BP(mean): 112 (09 Oct 2018 20:25) (106 - 134)  RR: 17 (09 Oct 2018 21:35) (7 - 23)  SpO2: 94% (09 Oct 2018 21:35) (94% - 100%)    Physical Exam:  Alert, NAD  Merocel on R removed prior to discharge.   Nonlabored Respirations HILLARY JAMAs on LE    10-09-18 @ 07:01  -  10-10-18 @ 07:00  --------------------------------------------------------  IN: 580 mL / OUT: 200 mL / NET: 380 mL          A/P: 58yMale s/p FESS yesterday  - D/c home today  - d/w attending

## 2018-10-11 LAB — SURGICAL PATHOLOGY STUDY: SIGNIFICANT CHANGE UP

## 2018-10-15 ENCOUNTER — APPOINTMENT (OUTPATIENT)
Dept: OTOLARYNGOLOGY | Facility: CLINIC | Age: 58
End: 2018-10-15
Payer: MEDICAID

## 2018-10-15 VITALS
SYSTOLIC BLOOD PRESSURE: 133 MMHG | OXYGEN SATURATION: 99 % | HEART RATE: 107 BPM | BODY MASS INDEX: 32.21 KG/M2 | DIASTOLIC BLOOD PRESSURE: 91 MMHG | WEIGHT: 225 LBS | HEIGHT: 70 IN

## 2018-10-15 PROCEDURE — 99214 OFFICE O/P EST MOD 30 MIN: CPT | Mod: 24

## 2018-10-22 ENCOUNTER — APPOINTMENT (OUTPATIENT)
Dept: OTOLARYNGOLOGY | Facility: CLINIC | Age: 58
End: 2018-10-22
Payer: MEDICAID

## 2018-10-22 VITALS
DIASTOLIC BLOOD PRESSURE: 82 MMHG | SYSTOLIC BLOOD PRESSURE: 121 MMHG | BODY MASS INDEX: 32.21 KG/M2 | HEIGHT: 70 IN | OXYGEN SATURATION: 98 % | HEART RATE: 92 BPM | WEIGHT: 225 LBS

## 2018-10-22 PROCEDURE — 99214 OFFICE O/P EST MOD 30 MIN: CPT

## 2018-10-25 ENCOUNTER — APPOINTMENT (OUTPATIENT)
Dept: RADIATION ONCOLOGY | Facility: CLINIC | Age: 58
End: 2018-10-25

## 2018-10-31 LAB — BACTERIA SPEC CULT: ABNORMAL

## 2018-11-19 ENCOUNTER — APPOINTMENT (OUTPATIENT)
Dept: MRI IMAGING | Facility: HOSPITAL | Age: 58
End: 2018-11-19

## 2018-11-30 ENCOUNTER — OTHER (OUTPATIENT)
Age: 58
End: 2018-11-30

## 2018-12-03 ENCOUNTER — APPOINTMENT (OUTPATIENT)
Dept: MRI IMAGING | Facility: HOSPITAL | Age: 58
End: 2018-12-03

## 2018-12-04 ENCOUNTER — APPOINTMENT (OUTPATIENT)
Dept: RADIATION ONCOLOGY | Facility: CLINIC | Age: 58
End: 2018-12-04

## 2019-01-28 ENCOUNTER — OUTPATIENT (OUTPATIENT)
Dept: OUTPATIENT SERVICES | Facility: HOSPITAL | Age: 59
LOS: 1 days | End: 2019-01-28
Payer: MEDICAID

## 2019-01-28 DIAGNOSIS — Z90.49 ACQUIRED ABSENCE OF OTHER SPECIFIED PARTS OF DIGESTIVE TRACT: Chronic | ICD-10-CM

## 2019-01-28 LAB — GLUCOSE BLDC GLUCOMTR-MCNC: 162 MG/DL — HIGH (ref 70–99)

## 2019-01-28 PROCEDURE — 82962 GLUCOSE BLOOD TEST: CPT

## 2019-01-28 PROCEDURE — A9552: CPT

## 2019-01-28 PROCEDURE — 78815 PET IMAGE W/CT SKULL-THIGH: CPT | Mod: 26

## 2019-01-28 PROCEDURE — 78815 PET IMAGE W/CT SKULL-THIGH: CPT

## 2019-02-04 ENCOUNTER — APPOINTMENT (OUTPATIENT)
Dept: RADIATION ONCOLOGY | Facility: CLINIC | Age: 59
End: 2019-02-04
Payer: MEDICAID

## 2019-02-04 VITALS
OXYGEN SATURATION: 99 % | SYSTOLIC BLOOD PRESSURE: 163 MMHG | BODY MASS INDEX: 26.87 KG/M2 | RESPIRATION RATE: 17 BRPM | DIASTOLIC BLOOD PRESSURE: 107 MMHG | WEIGHT: 187.3 LBS | HEART RATE: 91 BPM

## 2019-02-04 DIAGNOSIS — Z87.09 PERSONAL HISTORY OF OTHER DISEASES OF THE RESPIRATORY SYSTEM: ICD-10-CM

## 2019-02-04 PROCEDURE — 99214 OFFICE O/P EST MOD 30 MIN: CPT

## 2019-02-04 NOTE — PHYSICAL EXAM
[PERRL With Normal Accommodation] : pupils were equal in size, round, reactive to light [Cervical Lymph Nodes Enlarged Posterior Bilaterally] : posterior cervical [Cervical Lymph Nodes Enlarged Anterior Bilaterally] : anterior cervical [Supraclavicular Lymph Nodes Enlarged Bilaterally] : supraclavicular [de-identified] : improved EOM of the left eye. Still with far left gaze palsy. Left pupil is reactive, slightly sluggish.  [de-identified] : grade 1 mucositis of palate is mostly resolved.  [de-identified] : improved right lateral rectus palsy

## 2019-02-04 NOTE — DATA REVIEWED
[FreeTextEntry1] : I have personally reviewed all relevant imaging studies (MRI, PET, CT) and I have discussed the case with the referring physician.\par

## 2019-02-04 NOTE — HISTORY OF PRESENT ILLNESS
[FreeTextEntry1] : Mr. Sinha completed radiation therapy to the skull base/ sinus for a total of 6000 cGy/ 30 fractions from 6/26/18 to 8/9/18. He had an uncomplicated treatment course. Of note, he did have significant hyperglycemia that required hospital admission for management. \par He missed prior follow ups that were scheduled in December. \par \par 2/4/19-Follow up\[facility]  # [id number] (Vickie). \par At his last visit he notes nasal congestion and was planned for sinonasal debridement with Dr. Stewart. He was advised to follow up with Dr. Stewart and return post MRI. He has not yet seen Dr Osullivan. He also had poor blood pressure control including today. Daughter stated that he did take his Losartin this morning as prescribed. \par \par He underwent FESS and lysis of adhesions on 10/9/18. At his last visit with Dr. Stewart 10/22/18 he continued to complain of nasal congestion but noted improvement in headache and nose pain. He was advised to perform nasal irrigations irrigations and use Afrin and saline nasal sprays\par \par MRI orbits w/wo contrast was ordered by Dr. Maza and done at [facility] 12/13/18, showed: extensive heterogenous signal throughout clivus and cavernous sinuses/sphenoid sinuses; mass cannot be excluded. \par \par PET/CT done at [facility] 1/28/19 showed: \par Impression: \par 1. There are two small mildly hypermetabolic lymph nodes in the left neck. (There is a 1.0 x 0.5 cm hypermetabolic left level II lymph node with SUV of 3.3. and 0.9 x 0.4 cm lymph node in the left neck at level III is mildly FDG avid, with SUV of 2.4.)  They could represent metastatic disease.\par 2. The tissue in the skull base is not FDG avid. \par \par The radiology findings and case were reviewed at H&N tumor board today with recommendation for MRI imaging of the sinus and CT of neck/chest going forward; no PET scans. There was no obvious evidence of disease. Recommended follow up MRI in 3-4mos.\par \par Today he states he feels well overall. Main complaint is peripheral neuropathy. His left eye is still limited with blurry vision and leftward gaze palsy, he saw Ophtho at [facility] on 1/30/19 and will follow up there. Denies headache or sinus pressure. Denies facial pain or nasal pain. tolerating diet well. Appetite good,. no difficulties swallowing.  Patient states he does have significant constipation, not currently taking anything for that situation. Increased fluids encouraged to ensure hydration. Left eye vision unchanged. Denies changes in hearing or ringing in ears. \par \par \par ONCOLOGY HISTORY\par [facility]  # [id number], [id number]\par Mr. 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 He presented with dizziness, vomiting, blurry vision of Left eye to [facility] in early May.  Imaging showed sellar lesion, extending into sphenoid cavernous sinus prepontine regions.  He was transferred to [facility] on 05/10 for biopsy. CT sinuses 5/13/18  showed destructive central skull base mass.\par \par MR sella 5/13/18 showed showed destructive central skull base components within the clivus,  the bilateral sphenoid sinuses and the bilateral pterygopalatine fossae.  The sella floor appears intact on concurrent CT imaging, suggesting extra  pituitary origin. 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  \par \par On 5/17/18 he underwent  biopsies of his anterior skull  and skull base lesions by Dr Stewart. Pathology of anterior skull lesions showed infiltrating carcinoma, favoring adenoid cystic.  Sellar and anterior lesions positive for adenoid cystic carcinoma with neural invasion. immunostains support adenoid cystic carcinoma. Positive for , SMActin and p63.\par \par His case was reviewed in multidisciplinary tumor board with recommendation for radiation therapy, possibly with concurrent chemotherapy. \par \par At the time of initial consult he c/o of a left sided headache 5/10 and fatigue. He has double vision on left gaze. He also had right-sided tinnitus. Denied throat pain or dysphagia.\par \par Notably, he presented to to the ED at [facility] with dizziness and vomiting. He was treated with antihypertensive medication and d/c home. He notes blurry vision in left eye. He recently saw eye doctor 6/1/18 and diagnosed with glaucoma and ordered to use eye gtts. \par \par 6/8/18 Mr. Robles presents today for follow up. [facility]  #[id number]\par He underwent CT simulation for radiation therapy today, which he tolerated well. He notes pain 5/10 to his head at most times. He takes ibuprofen 600 mg QHS, without much relief. \par He underwent a PET scan yesterday. No evidence of distant metastasis. He underwent an MRI for further characterization of his disease site as well. Final reports of both scans are pending. He saw Med Onc Dr. Link Meneses and plan is for concurrent weekly chemotherapy. \par \par 6/21/18- Mr. Sinha presents today for follow up. Today he is feeling well. Notes headaches have improved after starting dexamethasone 2mg daily.  Still with visual deficits. \par He is here to discuss again the rationale for treatment since he thinks his oncologist told him the disease would worsen after treatment. \par

## 2019-02-04 NOTE — REVIEW OF SYSTEMS
[Constipation: Grade 0] : Constipation: Grade 0 [Diarrhea: Grade 1 - Increase of <4 stools per day over baseline; mild increase in ostomy output compared to baseline] : Diarrhea: Grade 1 - Increase of <4 stools per day over baseline; mild increase in ostomy output compared to baseline [Fatigue: Grade 1 - Fatigue relieved by rest] : Fatigue: Grade 1 - Fatigue relieved by rest [Xerostomia: Grade 0] : Xerostomia: Grade 0 [Oral Pain: Grade 0] : Oral Pain: Grade 0 [de-identified] : decreased vision left eye - unchanged

## 2019-02-11 ENCOUNTER — APPOINTMENT (OUTPATIENT)
Dept: OTOLARYNGOLOGY | Facility: CLINIC | Age: 59
End: 2019-02-11
Payer: MEDICAID

## 2019-02-11 VITALS
TEMPERATURE: 97.7 F | DIASTOLIC BLOOD PRESSURE: 97 MMHG | OXYGEN SATURATION: 88 % | SYSTOLIC BLOOD PRESSURE: 154 MMHG | HEART RATE: 98 BPM

## 2019-02-11 PROCEDURE — 99214 OFFICE O/P EST MOD 30 MIN: CPT | Mod: 25

## 2019-02-11 PROCEDURE — 31231 NASAL ENDOSCOPY DX: CPT

## 2019-02-15 NOTE — PROCEDURE
[FreeTextEntry6] : Nasal Endoscopy: Verbal consent was obtained prior to the procedure. Pt was sprayed with phenylephrine/lidocaine. A zero-degree rigid nasal endoscopy was used to examine nasal cavities and nasopharynx bilaterally. \par \par Right nasal cavity: dry yellow nasal crusting covering almost entire nasal mucosa\par Left nasal cavity:dry yellow nasal crusting covering almost entire nasal mucosa\par \par Patient refused debridement\par

## 2019-02-15 NOTE — PHYSICAL EXAM
[Nasal Endoscopy Performed] : nasal endoscopy was performed, see procedure section for findings [Normal] : no rashes [de-identified] : restricted left lateral gaze

## 2019-02-15 NOTE — ASSESSMENT
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 He is s/p chemo/RT- 6000 cGy/ 30 fractions from 6/26/18 to 8/9/18 (Dr. Johnston) with concurrent chemo (Dr. Maza). 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 \par \par S/P FESS 10/9/18. Finished radiation on 8/9/18. Pt refused debridement today. However, no sighs of acute infection\par \par - Highly recommend nasal saline spray and Neilmed BID, instruction given\par - RTC in 8 weeks \par - Continue seeing ophthalmologist for vision changes\par - Advised patient to call the office or go to ED if symptoms get worse\par - Patient verbalized understanding of plans above\par \par \par

## 2019-02-15 NOTE — REVIEW OF SYSTEMS
[Nasal Congestion] : nasal congestion [Recurrent Sinus Infections] : recurrent sinus infections [As Noted in HPI] : as noted in HPI [Negative] : Heme/Lymph [Nose Bleeds] : no nose bleeds

## 2019-02-15 NOTE — HISTORY OF PRESENT ILLNESS
[de-identified] : 58 yo Belizean-speaking man originally presented to Dr Valenzuela with a CNVI palsy and was found to have a large midline sinonasal/skull base mass. He underwent a transsphenoidal biopsy and frozen section returned as likely adenoid cystic carcinoma. Pathology confirmed adenoid cystic, positive for , SMActin and p63. His case was discussed at Head and Neck Tumour Board, which felt that resection to grossly negative disease would not be feasible and so chemoradiation therapy was recommended. \par \par Patient completed a total of 6000 cGy/ 30 fractions from 6/26/18 to 8/9/18 (Dr. Johnston) with concurrent chemo (Dr. Maza).\par  [FreeTextEntry1] : Patient returns with  for post radiation treatment visit (finished RT on 8/9/18). Patient reports doing well. He also reports nasal congestion and nasal crusting when he blows his nose. Patient stops using nasal saline spray and irrigation. He has been following up with opthalmologist for blurry vision. Pt denies epistaxis, facial pain/pressure, fever/chills, cough, sore throat, vision changes, eye pain.\par

## 2019-04-29 ENCOUNTER — APPOINTMENT (OUTPATIENT)
Dept: OTOLARYNGOLOGY | Facility: CLINIC | Age: 59
End: 2019-04-29
Payer: MEDICAID

## 2019-04-29 VITALS
SYSTOLIC BLOOD PRESSURE: 146 MMHG | TEMPERATURE: 96 F | OXYGEN SATURATION: 98 % | HEART RATE: 85 BPM | DIASTOLIC BLOOD PRESSURE: 89 MMHG

## 2019-04-29 DIAGNOSIS — Z00.00 ENCOUNTER FOR GENERAL ADULT MEDICAL EXAMINATION W/OUT ABNORMAL FINDINGS: ICD-10-CM

## 2019-04-29 DIAGNOSIS — J34.89 OTHER SPECIFIED DISORDERS OF NOSE AND NASAL SINUSES: ICD-10-CM

## 2019-04-29 PROCEDURE — 31231 NASAL ENDOSCOPY DX: CPT

## 2019-04-29 PROCEDURE — 99213 OFFICE O/P EST LOW 20 MIN: CPT | Mod: 25

## 2019-04-29 NOTE — HISTORY OF PRESENT ILLNESS
[de-identified] : 56 yo Cypriot-speaking man originally presented to Dr Valenzuela with a CNVI palsy and was found to have a large midline sinonasal/skull base mass. He underwent a transsphenoidal biopsy and frozen section returned as likely adenoid cystic carcinoma. Pathology confirmed adenoid cystic, positive for , SMActin and p63. His case was discussed at Head and Neck Tumour Board, which felt that resection to grossly negative disease would not be feasible and so chemoradiation therapy was recommended. \par \par Patient completed a total of 6000 cGy/ 30 fractions from 6/26/18 to 8/9/18 (Dr. Johnston) with concurrent chemo (Dr. Maza).\par  [FreeTextEntry1] : Presents for f/u ,no severe complains.

## 2019-04-29 NOTE — REVIEW OF SYSTEMS
[Nasal Congestion] : nasal congestion [Nose Bleeds] : no nose bleeds [Recurrent Sinus Infections] : recurrent sinus infections [As Noted in HPI] : as noted in HPI [Negative] : Heme/Lymph

## 2019-04-29 NOTE — ASSESSMENT
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 He is s/p chemo/RT- 6000 cGy/ 30 fractions from 6/26/18 to 8/9/18 (Dr. Johnston) with concurrent chemo (Dr. Maza). 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 \par \par S/P FESS 10/9/18. Finished radiation on 8/9/18. Pt refused debridement today. However, no sighs of acute infection\par \par - Highly recommend nasal saline spray and Neilmed BID, instruction given\par - MRI with contrast\par - Advised patient to call the office or go to ED if symptoms get worse\par - Patient verbalized understanding of plans above\par \par RTC after MRI\par \par

## 2019-04-29 NOTE — PROCEDURE
[FreeTextEntry6] : Nasal Endoscopy: Verbal consent was obtained prior to the procedure. Pt was sprayed with phenylephrine/lidocaine. A zero-degree rigid nasal endoscopy was used to examine nasal cavities and nasopharynx bilaterally. \par \par Right nasal cavity: well healed minimal edema\par Left nasal cavity:well healed minimal edema\par \par Patient refused debridement\par

## 2019-04-29 NOTE — PHYSICAL EXAM
[Nasal Endoscopy Performed] : nasal endoscopy was performed, see procedure section for findings [Normal] : no rashes [de-identified] : restricted left lateral gaze

## 2019-05-06 ENCOUNTER — APPOINTMENT (OUTPATIENT)
Dept: CT IMAGING | Facility: HOSPITAL | Age: 59
End: 2019-05-06
Payer: MEDICAID

## 2019-05-06 ENCOUNTER — OUTPATIENT (OUTPATIENT)
Dept: OUTPATIENT SERVICES | Facility: HOSPITAL | Age: 59
LOS: 1 days | End: 2019-05-06
Payer: MEDICAID

## 2019-05-06 ENCOUNTER — APPOINTMENT (OUTPATIENT)
Dept: MRI IMAGING | Facility: HOSPITAL | Age: 59
End: 2019-05-06
Payer: MEDICAID

## 2019-05-06 DIAGNOSIS — Z90.49 ACQUIRED ABSENCE OF OTHER SPECIFIED PARTS OF DIGESTIVE TRACT: Chronic | ICD-10-CM

## 2019-05-06 PROCEDURE — 70491 CT SOFT TISSUE NECK W/DYE: CPT | Mod: 26

## 2019-05-06 PROCEDURE — 70543 MRI ORBT/FAC/NCK W/O &W/DYE: CPT | Mod: 26

## 2019-05-06 PROCEDURE — 70491 CT SOFT TISSUE NECK W/DYE: CPT

## 2019-05-06 PROCEDURE — 70543 MRI ORBT/FAC/NCK W/O &W/DYE: CPT

## 2019-05-06 PROCEDURE — A9585: CPT

## 2019-05-09 DIAGNOSIS — C41.0 MALIGNANT NEOPLASM OF BONES OF SKULL AND FACE: ICD-10-CM

## 2019-05-09 DIAGNOSIS — C08.9 MALIGNANT NEOPLASM OF MAJOR SALIVARY GLAND, UNSPECIFIED: ICD-10-CM

## 2019-05-09 DIAGNOSIS — C08.0 MALIGNANT NEOPLASM OF SUBMANDIBULAR GLAND: ICD-10-CM

## 2019-05-15 NOTE — VITALS
[Least Pain Intensity: 0/10] : 0/10 [Maximal Pain Intensity: 4/10] : 4/10 [Pain Location: ___] : Pain Location: [unfilled] [Adjuvant (neuroleptics)] : adjuvant (neuroleptics) [80: Normal activity with effort; some signs or symptoms of disease.] : 80: Normal activity with effort; some signs or symptoms of disease.  [ECOG Performance Status: 1 - Restricted in physically strenuous activity but ambulatory and able to carry out work of a light or sedentary nature] : Performance Status: 1 - Restricted in physically strenuous activity but ambulatory and able to carry out work of a light or sedentary nature, e.g., light house work, office work

## 2019-05-21 ENCOUNTER — APPOINTMENT (OUTPATIENT)
Dept: RADIATION ONCOLOGY | Facility: CLINIC | Age: 59
End: 2019-05-21
Payer: MEDICAID

## 2019-05-21 PROCEDURE — 99214 OFFICE O/P EST MOD 30 MIN: CPT

## 2019-05-21 NOTE — REVIEW OF SYSTEMS
[Constipation: Grade 0] : Constipation: Grade 0 [Diarrhea: Grade 1 - Increase of <4 stools per day over baseline; mild increase in ostomy output compared to baseline] : Diarrhea: Grade 1 - Increase of <4 stools per day over baseline; mild increase in ostomy output compared to baseline [Dysphagia: Grade 0] : Dysphagia: Grade 0 [Fatigue: Grade 1 - Fatigue relieved by rest] : Fatigue: Grade 1 - Fatigue relieved by rest [Esophagitis: Grade 0] : Esophagitis: Grade 0 [Blurred Vision: Grade 0] : Blurred Vision: Grade 0 [Xerostomia: Grade 0] : Xerostomia: Grade 0 [Oral Pain: Grade 0] : Oral Pain: Grade 0 [Mucositis Oral: Grade 1 - Asymptomatic or mild symptoms; intervention not indicated] : Mucositis Oral: Grade 1 - Asymptomatic or mild symptoms; intervention not indicated [Cognitive Disturbance: Grade 0] : Cognitive Disturbance: Grade 0 [Dizziness: Grade 0] : Dizziness: Grade 0  [Headache: Grade 0] : Headache: Grade 0 [Dermatitis Radiation: Grade 0] : Dermatitis Radiation: Grade 0 [de-identified] : decreased vision left eye - unchanged

## 2019-05-21 NOTE — DATA REVIEWED
[FreeTextEntry1] : I have personally reviewed all relevant imaging studies (MRI, CT) and I have discussed the case with the referring physician.\par

## 2019-05-21 NOTE — PHYSICAL EXAM
[PERRL With Normal Accommodation] : pupils were equal in size, round, reactive to light [Cervical Lymph Nodes Enlarged Anterior Bilaterally] : anterior cervical [Cervical Lymph Nodes Enlarged Posterior Bilaterally] : posterior cervical [Supraclavicular Lymph Nodes Enlarged Bilaterally] : supraclavicular [Normal] : no joint swelling, no clubbing or cyanosis of the fingernails and muscle strength and tone were normal [Skin Color & Pigmentation] : normal skin color and pigmentation [Oriented To Time, Place, And Person] : oriented to person, place, and time [de-identified] : improved EOM of the left eye. Residual far left gaze palsy. Left pupil is reactive, slightly sluggish.  [de-identified] : normal oral exam. poor dentition  [de-identified] : improved right lateral rectus palsy

## 2019-05-21 NOTE — REASON FOR VISIT
[Routine Follow-Up] : routine follow-up visit for [Head and Neck Cancer] : head and neck cancer [Family Member] : family member

## 2019-05-21 NOTE — HISTORY OF PRESENT ILLNESS
[FreeTextEntry1] : Mr. Sinha completed radiation therapy to the skull base/ sinus for a total of 6000 cGy/ 30 fractions from 6/26/18 to 8/9/18. He had an uncomplicated treatment course. Of note, he did have significant hyperglycemia that required hospital admission for management. \par He missed prior follow ups that were scheduled in December. \par \par 5/21/19 - Follow-up\[facility]  #[id number] - [place]aina\par Mr. Sinha returns today for routine follow-up.  He was last seen here on 2/4/19.  Plan at that time was for repeat imaging in 3 months, f/u with Dr. Stewart and formal ophthalmology follow-up. \par \par MRI orbit face and neck with and without contrast 5/6/19 - Impression: There is evidence of response to interval therapy with reduction in volume of pathologic enhancement at the central skull base.  Residual, though diminished, enhancement is present within the pterygopalatine fossae, skull base foramina, cavernous sinuses and Meckel's caves.  \par \par CT neck with contrast 5/6/19 - Impression: no cervical lymphadenopathy \par \par He last saw Dr. Osullivan on 4/27/19 and the plan was to follow up. He last saw Dr. Maza yesterday for test results. Nothing else was discussed this visit per the daughter. He has an appt with [facility] in August.  Today, he reports feeling very tired and has weakness is his legs. He uses a cane most of the time but did not bring today. He did not feel like the weakness was related to not using the cane. His Bp remains poorly controlled 139/103 on the left and 157/112 on the right. Encouraged follow up as per the pt his bp medication was prescribed by a DrJorge in [place]. He continues to have blurriness in the left eye. No complaints of chest pain, sob or nausea. \par \par \par 2/4/19-Follow up\[facility]  # [id number] (Vickie). \par At his last visit he notes nasal congestion and was planned for sinonasal debridement with Dr. Stewart. He was advised to follow up with Dr. Stewart and return post MRI. He has not yet seen Dr Osullivan. He also had poor blood pressure control including today. Daughter stated that he did take his Losartin this morning as prescribed. \par \par He underwent FESS and lysis of adhesions on 10/9/18. At his last visit with Dr. Stewart 10/22/18 he continued to complain of nasal congestion but noted improvement in headache and nose pain. He was advised to perform nasal irrigations irrigations and use Afrin and saline nasal sprays\par \par MRI orbits w/wo contrast was ordered by Dr. Maza and done at [facility] 12/13/18, showed: extensive heterogenous signal throughout clivus and cavernous sinuses/sphenoid sinuses; mass cannot be excluded. \par \par PET/CT done at [facility] 1/28/19 showed: \par Impression: \par 1. There are two small mildly hypermetabolic lymph nodes in the left neck. (There is a 1.0 x 0.5 cm hypermetabolic left level II lymph node with SUV of 3.3. and 0.9 x 0.4 cm lymph node in the left neck at level III is mildly FDG avid, with SUV of 2.4.)  They could represent metastatic disease.\par 2. The tissue in the skull base is not FDG avid. \par \par The radiology findings and case were reviewed at H&N tumor board today with recommendation for MRI imaging of the sinus and CT of neck/chest going forward; no PET scans. There was no obvious evidence of disease. Recommended follow up MRI in 3-4mos.\par \par Today he states he feels well overall. Main complaint is peripheral neuropathy. His left eye is still limited with blurry vision and leftward gaze palsy, he saw Ophtho at [facility] on 1/30/19 and will follow up there. Denies headache or sinus pressure. Denies facial pain or nasal pain. tolerating diet well. Appetite good,. no difficulties swallowing.  Patient states he does have significant constipation, not currently taking anything for that situation. Increased fluids encouraged to ensure hydration. Left eye vision unchanged. Denies changes in hearing or ringing in ears. \par \par \par ONCOLOGY HISTORY\par [facility]  # [id number], [id number]\par Mr. 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 He presented with dizziness, vomiting, blurry vision of Left eye to [facility] in early May.  Imaging showed sellar lesion, extending into sphenoid cavernous sinus prepontine regions.  He was transferred to [facility] on 05/10 for biopsy. CT sinuses 5/13/18  showed destructive central skull base mass.\par \par MR sella 5/13/18 showed showed destructive central skull base components within the clivus,  the bilateral sphenoid sinuses and the bilateral pterygopalatine fossae.  The sella floor appears intact on concurrent CT imaging, suggesting extra  pituitary origin. 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  \par \par On 5/17/18 he underwent  biopsies of his anterior skull  and skull base lesions by Dr Stewart. Pathology of anterior skull lesions showed infiltrating carcinoma, favoring adenoid cystic.  Sellar and anterior lesions positive for adenoid cystic carcinoma with neural invasion. immunostains support adenoid cystic carcinoma. Positive for , SMActin and p63.\par \par His case was reviewed in multidisciplinary tumor board with recommendation for radiation therapy, possibly with concurrent chemotherapy. \par \par At the time of initial consult he c/o of a left sided headache 5/10 and fatigue. He has double vision on left gaze. He also had right-sided tinnitus. Denied throat pain or dysphagia.\par \par Notably, he presented to to the ED at [facility] with dizziness and vomiting. He was treated with antihypertensive medication and d/c home. He notes blurry vision in left eye. He recently saw eye doctor 6/1/18 and diagnosed with glaucoma and ordered to use eye gtts. \par \par 6/8/18 Mr. Robles presents today for follow up. [facility]  #[id number]\par He underwent CT simulation for radiation therapy today, which he tolerated well. He notes pain 5/10 to his head at most times. He takes ibuprofen 600 mg QHS, without much relief. \par He underwent a PET scan yesterday. No evidence of distant metastasis. He underwent an MRI for further characterization of his disease site as well. Final reports of both scans are pending. He saw Med Onc Dr. Maza Tuesday and plan is for concurrent weekly chemotherapy. \par \par 6/21/18- Mr. Sinha presents today for follow up. Today he is feeling well. Notes headaches have improved after starting dexamethasone 2mg daily.  Still with visual deficits. \par He is here to discuss again the rationale for treatment since he thinks his oncologist told him the disease would worsen after treatment. \par

## 2019-11-15 ENCOUNTER — APPOINTMENT (OUTPATIENT)
Dept: MRI IMAGING | Facility: HOSPITAL | Age: 59
End: 2019-11-15

## 2019-11-19 NOTE — VITALS
[80: Normal activity with effort; some signs or symptoms of disease.] : 80: Normal activity with effort; some signs or symptoms of disease.  [ECOG Performance Status: 1 - Restricted in physically strenuous activity but ambulatory and able to carry out work of a light or sedentary nature] : Performance Status: 1 - Restricted in physically strenuous activity but ambulatory and able to carry out work of a light or sedentary nature, e.g., light house work, office work

## 2019-11-21 ENCOUNTER — APPOINTMENT (OUTPATIENT)
Dept: RADIATION ONCOLOGY | Facility: CLINIC | Age: 59
End: 2019-11-21
Payer: MEDICAID

## 2019-11-21 VITALS
RESPIRATION RATE: 16 BRPM | SYSTOLIC BLOOD PRESSURE: 156 MMHG | WEIGHT: 217 LBS | HEART RATE: 63 BPM | DIASTOLIC BLOOD PRESSURE: 88 MMHG | BODY MASS INDEX: 31.14 KG/M2 | TEMPERATURE: 98.1 F | OXYGEN SATURATION: 100 %

## 2019-11-21 PROCEDURE — 99214 OFFICE O/P EST MOD 30 MIN: CPT

## 2019-11-21 RX ORDER — ACETAMINOPHEN 325 MG/1
325 TABLET ORAL
Qty: 200 | Refills: 0 | Status: DISCONTINUED | COMMUNITY
Start: 2018-05-18 | End: 2019-11-21

## 2019-11-21 RX ORDER — ONDANSETRON 4 MG/1
4 TABLET ORAL EVERY 8 HOURS
Qty: 60 | Refills: 3 | Status: DISCONTINUED | COMMUNITY
Start: 2018-09-13 | End: 2019-11-21

## 2019-11-21 RX ORDER — METHYLPREDNISOLONE 4 MG/1
4 TABLET ORAL
Qty: 1 | Refills: 0 | Status: DISCONTINUED | COMMUNITY
Start: 2018-10-15 | End: 2019-11-21

## 2019-11-21 RX ORDER — LEVOFLOXACIN 750 MG/1
750 TABLET, FILM COATED ORAL DAILY
Qty: 10 | Refills: 0 | Status: DISCONTINUED | COMMUNITY
Start: 2018-10-15 | End: 2019-11-21

## 2019-11-21 RX ORDER — OMEPRAZOLE MAGNESIUM 20 MG/1
20 TABLET, DELAYED RELEASE ORAL DAILY
Qty: 30 | Refills: 2 | Status: DISCONTINUED | COMMUNITY
Start: 2018-06-08 | End: 2019-11-21

## 2019-11-21 RX ORDER — SULFAMETHOXAZOLE AND TRIMETHOPRIM 800; 160 MG/1; MG/1
800-160 TABLET ORAL TWICE DAILY
Qty: 20 | Refills: 0 | Status: DISCONTINUED | COMMUNITY
Start: 2018-10-19 | End: 2019-11-21

## 2019-11-21 RX ORDER — POLYETHYLENE GLYCOL 3350 17 G/17G
17 POWDER, FOR SOLUTION ORAL
Qty: 255 | Refills: 0 | Status: DISCONTINUED | COMMUNITY
Start: 2018-05-18 | End: 2019-11-21

## 2019-11-21 RX ORDER — GABAPENTIN 300 MG/1
300 CAPSULE ORAL 3 TIMES DAILY
Qty: 270 | Refills: 2 | Status: DISCONTINUED | COMMUNITY
Start: 2018-07-10 | End: 2019-11-21

## 2019-11-21 NOTE — REASON FOR VISIT
[Routine Follow-Up] : routine follow-up visit for [Head and Neck Cancer] : head and neck cancer [Family Member] : family member [[employer] ] : provided by [employer]   [FreeTextEntry1] : [id number] [TWNoteComboBox1] : [place]

## 2019-11-21 NOTE — HISTORY OF PRESENT ILLNESS
[FreeTextEntry1] : Mr. Sinha completed radiation therapy to the skull base/ sinus for a total of 6000 cGy/ 30 fractions from 6/26/18 to 8/9/18. He had an uncomplicated treatment course. Of note, he did have significant hyperglycemia that required hospital admission for management. \par \par 11/21/19 - Follow-up\[facility]  # [id number]\par Mr. Sinha returns for routine follow-up.  He was last seen on 5/21/19.  Plan was for repeat imaging in 6 months, f/u with Dr. Maza, Dr. Stewart, PCP, endocrine and optho.  \par \par MRI orbit/face/neck with and without contrast 11/12/19 ([facility]) - IMPRESSION: A hypoenhancing lesion is seen within the left sphenoid sinus with has slightly decreased in size.  Abnormal enhancement is seen within the pterygopalatine fossa bilaterally which is consistent with tumor involvement.  This is unchanged.  Smooth symmetric thickening of the mucosa of the posterior nasopharynx has increased since the prior exam.  This is nonspecific.  \par \par He last saw Dr. Maza 3 months ago and is scheduled to see him again on 11/27.  He states he saw Dr. Osullivan 3 months ago, although the last documented encounter was in April.  He last saw his PCP 3 months ago.  He states he has never seen an endocrinologist  He last saw optho on 11/12.  Today, he reports feeling much better since his last visit here.  He notes that he still feels like he doesn’t have all his strength back, but overall feels well.  He states he felt a headache coming on today, but it went away.  He otherwise denies headaches.  He reports left eye blurry vision has improved but it is not like his right eye.  He also reports an episode of midsternal chest pain 3 days ago while at rest, lasting 5-6 minutes, resolved spontaneously.  He did not seek evaluation.  He is otherwise without complaints. \par \par 5/21/19 - Follow-up\[facility]  #[id number] - [place]ania\par Mr. Sinha returns today for routine follow-up.  He was last seen here on 2/4/19.  Plan at that time was for repeat imaging in 3 months, f/u with Dr. Stewart and formal ophthalmology follow-up. \par \par MRI orbit face and neck with and without contrast 5/6/19 - Impression: There is evidence of response to interval therapy with reduction in volume of pathologic enhancement at the central skull base.  Residual, though diminished, enhancement is present within the pterygopalatine fossae, skull base foramina, cavernous sinuses and Meckel's caves.  \par \par CT neck with contrast 5/6/19 - Impression: no cervical lymphadenopathy \par \par He last saw Dr. Osullivan on 4/27/19 and the plan was to follow up. He last saw Dr. Maza yesterday for test results. Nothing else was discussed this visit per the daughter. He has an appt with [facility] in August.  Today, he reports feeling very tired and has weakness is his legs. He uses a cane most of the time but did not bring today. He did not feel like the weakness was related to not using the cane. His Bp remains poorly controlled 139/103 on the left and 157/112 on the right. Encouraged follow up as per the pt his bp medication was prescribed by a DrJorge in [place]. He continues to have blurriness in the left eye. No complaints of chest pain, sob or nausea. \par \par \[id number] \par At his last visit he notes nasal congestion and was planned for sinonasal debridement with Dr. Stewart. He was advised to follow up with Dr. Stewart and return post MRI. He has not yet seen Dr Osullivan. He also had poor blood pressure control including today. Daughter stated that he did take his Losartin this morning as prescribed. \par \par He underwent FESS and lysis of adhesions on 10/9/18. At his last visit with Dr. Stewart 10/22/18 he continued to complain of nasal congestion but noted improvement in headache and nose pain. He was advised to perform nasal irrigations irrigations and use Afrin and saline nasal sprays\par \par MRI orbits w/wo contrast was ordered by Dr. Maza and done at [facility] 12/13/18, showed: extensive heterogenous signal throughout clivus and cavernous sinuses/sphenoid sinuses; mass cannot be excluded. \par \par PET/CT done at [facility] 1/28/19 showed: \par Impression: \par 1. There are two small mildly hypermetabolic lymph nodes in the left neck. (There is a 1.0 x 0.5 cm hypermetabolic left level II lymph node with SUV of 3.3. and 0.9 x 0.4 cm lymph node in the left neck at level III is mildly FDG avid, with SUV of 2.4.)  They could represent metastatic disease.\par 2. The tissue in the skull base is not FDG avid. \par \par The radiology findings and case were reviewed at H&N tumor board today with recommendation for MRI imaging of the sinus and CT of neck/chest going forward; no PET scans. There was no obvious evidence of disease. Recommended follow up MRI in 3-4mos.\par \par Today he states he feels well overall. Main complaint is peripheral neuropathy. His left eye is still limited with blurry vision and leftward gaze palsy, he saw Ophtho at [facility] on 1/30/19 and will follow up there. Denies headache or sinus pressure. Denies facial pain or nasal pain. tolerating diet well. Appetite good,. no difficulties swallowing.  Patient states he does have significant constipation, not currently taking anything for that situation. Increased fluids encouraged to ensure hydration. Left eye vision unchanged. Denies changes in hearing or ringing in ears. \par \par \par ONCOLOGY HISTORY\par [place]  # [id number], [id number]\par Mr. 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 He presented with dizziness, vomiting, blurry vision of Left eye to [facility] in early May.  Imaging showed sellar lesion, extending into sphenoid cavernous sinus prepontine regions.  He was transferred to [facility] on 05/10 for biopsy. CT sinuses 5/13/18  showed destructive central skull base mass.\par \par MR sella 5/13/18 showed showed destructive central skull base components within the clivus,  the bilateral sphenoid sinuses and the bilateral pterygopalatine fossae.  The sella floor appears intact on concurrent CT imaging, suggesting extra  pituitary origin. 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  \par \par On 5/17/18 he underwent  biopsies of his anterior skull  and skull base lesions by Dr Stewart. Pathology of anterior skull lesions showed infiltrating carcinoma, favoring adenoid cystic.  Sellar and anterior lesions positive for adenoid cystic carcinoma with neural invasion. immunostains support adenoid cystic carcinoma. Positive for , SMActin and p63.\par \par His case was reviewed in multidisciplinary tumor board with recommendation for radiation therapy, possibly with concurrent chemotherapy. \par \par At the time of initial consult he c/o of a left sided headache 5/10 and fatigue. He has double vision on left gaze. He also had right-sided tinnitus. Denied throat pain or dysphagia.\par \par Notably, he presented to to the ED at [facility] with dizziness and vomiting. He was treated with antihypertensive medication and d/c home. He notes blurry vision in left eye. He recently saw eye doctor 6/1/18 and diagnosed with glaucoma and ordered to use eye gtts. \par \par 6/8/18 Mr. Robles presents today for follow up. [facility]  #[id number]\par He underwent CT simulation for radiation therapy today, which he tolerated well. He notes pain 5/10 to his head at most times. He takes ibuprofen 600 mg QHS, without much relief. \par He underwent a PET scan yesterday. No evidence of distant metastasis. He underwent an MRI for further characterization of his disease site as well. Final reports of both scans are pending. He saw Med Onc Dr. Maza [place] and plan is for concurrent weekly chemotherapy. \par \par 6/21/18- Mr. Sinha presents today for follow up. Today he is feeling well. Notes headaches have improved after starting dexamethasone 2mg daily.  Still with visual deficits. \par He is here to discuss again the rationale for treatment since he thinks his oncologist told him the disease would worsen after treatment. \par

## 2019-11-21 NOTE — PHYSICAL EXAM
[PERRL With Normal Accommodation] : pupils were equal in size, round, reactive to light [Cervical Lymph Nodes Enlarged Posterior Bilaterally] : posterior cervical [Cervical Lymph Nodes Enlarged Anterior Bilaterally] : anterior cervical [Supraclavicular Lymph Nodes Enlarged Bilaterally] : supraclavicular [Skin Color & Pigmentation] : normal skin color and pigmentation [Oriented To Time, Place, And Person] : oriented to person, place, and time [Heart Rate And Rhythm] : heart rate and rhythm were normal [Heart Sounds] : normal S1 and S2 [Normal] : no focal deficits [de-identified] : slight left eye lateral gaze palsy - stable.  [de-identified] : normal oral exam. poor dentition

## 2019-11-21 NOTE — REVIEW OF SYSTEMS
[Constipation: Grade 0] : Constipation: Grade 0 [Dysphagia: Grade 0] : Dysphagia: Grade 0 [Esophagitis: Grade 0] : Esophagitis: Grade 0 [Fatigue: Grade 1 - Fatigue relieved by rest] : Fatigue: Grade 1 - Fatigue relieved by rest [Blurred Vision: Grade 0] : Blurred Vision: Grade 0 [Xerostomia: Grade 0] : Xerostomia: Grade 0 [Oral Pain: Grade 0] : Oral Pain: Grade 0 [Cognitive Disturbance: Grade 0] : Cognitive Disturbance: Grade 0 [Dizziness: Grade 0] : Dizziness: Grade 0  [Headache: Grade 0] : Headache: Grade 0 [Dermatitis Radiation: Grade 0] : Dermatitis Radiation: Grade 0 [Patient Intake Form Reviewed] : Patient intake form was reviewed [Diarrhea: Grade 0] : Diarrhea: Grade 0 [Mucositis Oral: Grade 0] : Mucositis Oral: Grade 0  [Visual Disturbances] : visual disturbances [Muscle Weakness] : muscle weakness [Negative] : Heme/Lymph [Eye Pain] : no eye pain [Dry Eyes] : no dryness of the eyes [FreeTextEntry3] : improving left eye blurry vision [FreeTextEntry5] : see HPI [de-identified] : decreased vision left eye - unchanged

## 2020-02-06 LAB — BACTERIA FLD CULT: ABNORMAL

## 2020-03-21 ENCOUNTER — RESULT REVIEW (OUTPATIENT)
Age: 60
End: 2020-03-21

## 2020-03-21 ENCOUNTER — APPOINTMENT (OUTPATIENT)
Dept: MRI IMAGING | Facility: HOSPITAL | Age: 60
End: 2020-03-21
Payer: MEDICAID

## 2020-03-21 ENCOUNTER — OUTPATIENT (OUTPATIENT)
Dept: OUTPATIENT SERVICES | Facility: HOSPITAL | Age: 60
LOS: 1 days | End: 2020-03-21
Payer: MEDICAID

## 2020-03-21 DIAGNOSIS — Z90.49 ACQUIRED ABSENCE OF OTHER SPECIFIED PARTS OF DIGESTIVE TRACT: Chronic | ICD-10-CM

## 2020-03-21 PROCEDURE — A9585: CPT

## 2020-03-21 PROCEDURE — 70543 MRI ORBT/FAC/NCK W/O &W/DYE: CPT | Mod: 26

## 2020-03-21 PROCEDURE — 70543 MRI ORBT/FAC/NCK W/O &W/DYE: CPT

## 2020-05-20 NOTE — REASON FOR VISIT
[Routine Follow-Up] : routine follow-up visit for [Head and Neck Cancer] : head and neck cancer [Family Member] : family member [[employer] ] : provided by [employer]   [FreeTextEntry1] : [id number] [TWNoteComboBox1] : [place]

## 2020-05-20 NOTE — REVIEW OF SYSTEMS
[Patient Intake Form Reviewed] : Patient intake form was reviewed [Eye Pain] : no eye pain [Dry Eyes] : no dryness of the eyes [Visual Disturbances] : visual disturbances [Muscle Weakness] : muscle weakness [Negative] : Heme/Lymph [FreeTextEntry5] : see HPI [FreeTextEntry3] : improving left eye blurry vision [Constipation: Grade 0] : Constipation: Grade 0 [Diarrhea: Grade 0] : Diarrhea: Grade 0 [Dysphagia: Grade 0] : Dysphagia: Grade 0 [Esophagitis: Grade 0] : Esophagitis: Grade 0 [Fatigue: Grade 1 - Fatigue relieved by rest] : Fatigue: Grade 1 - Fatigue relieved by rest [Mucositis Oral: Grade 0] : Mucositis Oral: Grade 0  [Xerostomia: Grade 0] : Xerostomia: Grade 0 [Blurred Vision: Grade 0] : Blurred Vision: Grade 0 [Oral Pain: Grade 0] : Oral Pain: Grade 0 [de-identified] : decreased vision left eye - unchanged [Cognitive Disturbance: Grade 0] : Cognitive Disturbance: Grade 0 [Dizziness: Grade 0] : Dizziness: Grade 0  [Headache: Grade 0] : Headache: Grade 0 [Dermatitis Radiation: Grade 0] : Dermatitis Radiation: Grade 0

## 2020-05-20 NOTE — PHYSICAL EXAM
[PERRL With Normal Accommodation] : pupils were equal in size, round, reactive to light [Heart Rate And Rhythm] : heart rate and rhythm were normal [Heart Sounds] : normal S1 and S2 [Cervical Lymph Nodes Enlarged Posterior Bilaterally] : posterior cervical [Cervical Lymph Nodes Enlarged Anterior Bilaterally] : anterior cervical [Supraclavicular Lymph Nodes Enlarged Bilaterally] : supraclavicular [Skin Color & Pigmentation] : normal skin color and pigmentation [Normal] : no joint swelling, no clubbing or cyanosis of the fingernails and muscle strength and tone were normal [Oriented To Time, Place, And Person] : oriented to person, place, and time [de-identified] : normal oral exam. poor dentition  [de-identified] : slight left eye lateral gaze palsy - stable.

## 2020-05-20 NOTE — HISTORY OF PRESENT ILLNESS
[FreeTextEntry1] : Mr. Sinha completed radiation therapy to the skull base/ sinus for a total of 6000 cGy/ 30 fractions from 6/26/18 to 8/9/18. He had an uncomplicated treatment course. Of note, he did have significant hyperglycemia that required hospital admission for management. \par \par 5/21/20 - Follow-up\[facility]  # \par Mr. Sinha returns for routine follow-up.  He was last seen on 11/21/19.  Plan was for MRI in 6 months, f/u with Dr. Stewart for nasopharyngoscopy, f/u with PCP regarding chest pain, f/u with dental, Dr. Maza, and optho, nasal sprays as per Dr. Stewart. \par \par MRI orbit/face/neck 3/21/20 - There has been slight continued diminution in volume of enhancing soft tissue within the pterygopalatine fossae and at the central skull base, as above. There is continued mild asymmetric fullness of the left cavernous \par sinus and at Meckel's cave. \par \par He last saw Dr. Stewart on ___.  He last saw Dr. Maza on ____.  He last saw dental on ____.  He last saw optho on ____.  He last saw his PCP on _____.  Today, he \par \par nasal spray?\par \par 11/21/19 - Follow-up\[facility]  # [id number]\par Mr. Sinha returns for routine follow-up.  He was last seen on 5/21/19.  Plan was for repeat imaging in 6 months, f/u with Dr. Maza, Dr. Stewart, PCP, endocrine and optho.  \par \par MRI orbit/face/neck with and without contrast 11/12/19 ([facility]) - IMPRESSION: A hypoenhancing lesion is seen within the left sphenoid sinus with has slightly decreased in size.  Abnormal enhancement is seen within the pterygopalatine fossa bilaterally which is consistent with tumor involvement.  This is unchanged.  Smooth symmetric thickening of the mucosa of the posterior nasopharynx has increased since the prior exam.  This is nonspecific.  \par \par He last saw Dr. Maza 3 months ago and is scheduled to see him again on 11/27.  He states he saw Dr. Osullivan 3 months ago, although the last documented encounter was in April.  He last saw his PCP 3 months ago.  He states he has never seen an endocrinologist  He last saw optho on 11/12.  Today, he reports feeling much better since his last visit here.  He notes that he still feels like he doesn’t have all his strength back, but overall feels well.  He states he felt a headache coming on today, but it went away.  He otherwise denies headaches.  He reports left eye blurry vision has improved but it is not like his right eye.  He also reports an episode of midsternal chest pain 3 days ago while at rest, lasting 5-6 minutes, resolved spontaneously.  He did not seek evaluation.  He is otherwise without complaints. \par \par 5/21/19 - Follow-up\[facility]  #[id number] - [place]\par Mr. Sinha returns today for routine follow-up.  He was last seen here on 2/4/19.  Plan at that time was for repeat imaging in 3 months, f/u with Dr. Stewart and formal ophthalmology follow-up. \par \par MRI orbit face and neck with and without contrast 5/6/19 - Impression: There is evidence of response to interval therapy with reduction in volume of pathologic enhancement at the central skull base.  Residual, though diminished, enhancement is present within the pterygopalatine fossae, skull base foramina, cavernous sinuses and Meckel's caves.  \par \par CT neck with contrast 5/6/19 - Impression: no cervical lymphadenopathy \par \par He last saw Dr. Osullivan on 4/27/19 and the plan was to follow up. He last saw Dr. Maza yesterday for test results. Nothing else was discussed this visit per the daughter. He has an appt with optho in August.  Today, he reports feeling very tired and has weakness is his legs. He uses a cane most of the time but did not bring today. He did not feel like the weakness was related to not using the cane. His Bp remains poorly controlled 139/103 on the left and 157/112 on the right. Encouraged follow up as per the pt his bp medication was prescribed by a DrJorge in [place]. He continues to have blurriness in the left eye. No complaints of chest pain, sob or nausea. \par \par \par 2/4/19-Follow up\[facility]  # [id number] ([place]ickie). \par At his last visit he notes nasal congestion and was planned for sinonasal debridement with Dr. Stewart. He was advised to follow up with Dr. Stewart and return post MRI. He has not yet seen Dr Osullivan. He also had poor blood pressure control including today. Daughter stated that he did take his Losartin this morning as prescribed. \par \par He underwent FESS and lysis of adhesions on 10/9/18. At his last visit with Dr. Stewart 10/22/18 he continued to complain of nasal congestion but noted improvement in headache and nose pain. He was advised to perform nasal irrigations irrigations and use Afrin and saline nasal sprays\par \par MRI orbits w/wo contrast was ordered by Dr. Maza and done at [facility] 12/13/18, showed: extensive heterogenous signal throughout clivus and cavernous sinuses/sphenoid sinuses; mass cannot be excluded. \par \par PET/CT done at [facility] 1/28/19 showed: \par Impression: \par 1. There are two small mildly hypermetabolic lymph nodes in the left neck. (There is a 1.0 x 0.5 cm hypermetabolic left level II lymph node with SUV of 3.3. and 0.9 x 0.4 cm lymph node in the left neck at level III is mildly FDG avid, with SUV of 2.4.)  They could represent metastatic disease.\par 2. The tissue in the skull base is not FDG avid. \par \par The radiology findings and case were reviewed at H&N tumor board today with recommendation for MRI imaging of the sinus and CT of neck/chest going forward; no PET scans. There was no obvious evidence of disease. Recommended follow up MRI in 3-4mos.\par \par Today he states he feels well overall. Main complaint is peripheral neuropathy. His left eye is still limited with blurry vision and leftward gaze palsy, he saw Ophtho at [facility] on 1/30/19 and will follow up there. Denies headache or sinus pressure. Denies facial pain or nasal pain. tolerating diet well. Appetite good,. no difficulties swallowing.  Patient states he does have significant constipation, not currently taking anything for that situation. Increased fluids encouraged to ensure hydration. Left eye vision unchanged. Denies changes in hearing or ringing in ears. \par \par \par ONCOLOGY HISTORY\par [place]  # [id number], [id number]\par Mr. 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 He presented with dizziness, vomiting, blurry vision of Left eye to [facility] in early May.  Imaging showed sellar lesion, extending into sphenoid cavernous sinus prepontine regions.  He was transferred to [facility] on 05/10 for biopsy. CT sinuses 5/13/18  showed destructive central skull base mass.\par \par MR sella 5/13/18 showed showed destructive central skull base components within the clivus,  the bilateral sphenoid sinuses and the bilateral pterygopalatine fossae.  The sella floor appears intact on concurrent CT imaging, suggesting extra  pituitary origin. 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  \par \par On 5/17/18 he underwent  biopsies of his anterior skull  and skull base lesions by Dr Stewart. Pathology of anterior skull lesions showed infiltrating carcinoma, favoring adenoid cystic.  Sellar and anterior lesions positive for adenoid cystic carcinoma with neural invasion. immunostains support adenoid cystic carcinoma. Positive for , SMActin and p63.\par \par His case was reviewed in multidisciplinary tumor board with recommendation for radiation therapy, possibly with concurrent chemotherapy. \par \par At the time of initial consult he c/o of a left sided headache 5/10 and fatigue. He has double vision on left gaze. He also had right-sided tinnitus. Denied throat pain or dysphagia.\par \par Notably, he presented to to the ED at [facility] with dizziness and vomiting. He was treated with antihypertensive medication and d/c home. He notes blurry vision in left eye. He recently saw eye doctor 6/1/18 and diagnosed with glaucoma and ordered to use eye gtts. \par \par 6/8/18 Mr. Robles presents today for follow up. [facility]  #[id number]\par He underwent CT simulation for radiation therapy today, which he tolerated well. He notes pain 5/10 to his head at most times. He takes ibuprofen 600 mg QHS, without much relief. \par He underwent a PET scan yesterday. No evidence of distant metastasis. He underwent an MRI for further characterization of his disease site as well. Final reports of both scans are pending. He saw Med Onc Dr. Link Meneses and plan is for concurrent weekly chemotherapy. \par \par 6/21/18- Mr. Sinha presents today for follow up. Today he is feeling well. Notes headaches have improved after starting dexamethasone 2mg daily.  Still with visual deficits. \par He is here to discuss again the rationale for treatment since he thinks his oncologist told him the disease would worsen after treatment. \par

## 2020-05-21 ENCOUNTER — APPOINTMENT (OUTPATIENT)
Dept: RADIATION ONCOLOGY | Facility: CLINIC | Age: 60
End: 2020-05-21

## 2020-09-24 ENCOUNTER — APPOINTMENT (OUTPATIENT)
Dept: RADIATION ONCOLOGY | Facility: CLINIC | Age: 60
End: 2020-09-24

## 2020-09-29 ENCOUNTER — OUTPATIENT (OUTPATIENT)
Dept: OUTPATIENT SERVICES | Facility: HOSPITAL | Age: 60
LOS: 1 days | End: 2020-09-29
Payer: MEDICARE

## 2020-09-29 ENCOUNTER — APPOINTMENT (OUTPATIENT)
Dept: MRI IMAGING | Facility: HOSPITAL | Age: 60
End: 2020-09-29
Payer: MEDICARE

## 2020-09-29 DIAGNOSIS — Z90.49 ACQUIRED ABSENCE OF OTHER SPECIFIED PARTS OF DIGESTIVE TRACT: Chronic | ICD-10-CM

## 2020-09-29 PROCEDURE — 70543 MRI ORBT/FAC/NCK W/O &W/DYE: CPT

## 2020-09-29 PROCEDURE — A9585: CPT

## 2020-09-29 PROCEDURE — 70543 MRI ORBT/FAC/NCK W/O &W/DYE: CPT | Mod: 26

## 2020-10-01 ENCOUNTER — APPOINTMENT (OUTPATIENT)
Dept: RADIATION ONCOLOGY | Facility: CLINIC | Age: 60
End: 2020-10-01
Payer: MEDICARE

## 2020-10-01 VITALS
SYSTOLIC BLOOD PRESSURE: 146 MMHG | DIASTOLIC BLOOD PRESSURE: 90 MMHG | BODY MASS INDEX: 33.98 KG/M2 | HEART RATE: 83 BPM | RESPIRATION RATE: 15 BRPM | WEIGHT: 236.8 LBS | OXYGEN SATURATION: 98 % | TEMPERATURE: 97.9 F

## 2020-10-01 DIAGNOSIS — H49.9 UNSPECIFIED PARALYTIC STRABISMUS: ICD-10-CM

## 2020-10-01 PROCEDURE — 99214 OFFICE O/P EST MOD 30 MIN: CPT

## 2020-10-01 RX ORDER — LEVOFLOXACIN 500 MG/1
500 TABLET, FILM COATED ORAL DAILY
Qty: 10 | Refills: 0 | Status: DISCONTINUED | COMMUNITY
Start: 2020-01-31 | End: 2020-10-01

## 2020-10-01 NOTE — REASON FOR VISIT
[Routine Follow-Up] : routine follow-up visit for [Head and Neck Cancer] : head and neck cancer [Family Member] : family member [[employer] ] : provided by [employer]   [FreeTextEntry1] : [id number] [FreeTextEntry2] : Nicholas [TWNoteComboBox1] : Camb[place]ian

## 2020-10-01 NOTE — HISTORY OF PRESENT ILLNESS
[FreeTextEntry1] : Mr. Sinha completed radiation therapy to the skull base/ sinus for a total of 6000 cGy/ 30 fractions from 6/26/18 to 8/9/18. He had an uncomplicated treatment course. Of note, he did have significant hyperglycemia that required hospital admission for management. \par \par 10/1/20 - Follow-up\[facility]  # \par Mr. Sinha returns for routine follow-up.  He was last seen on 11/21/19.  Plan was for MRI in 6 months, f/u with Dr. Stewart for nasopharyngoscopy, f/u with PCP regarding chest pain, f/u with dental, Dr. Maza, and optho, nasal sprays as per Dr. Stewart.  Most recent MRI as follows:\par \par MRI orbit/face/neck 9/29/20 - Stable sites of residual enhancement at the central skull base and sphenoid sinuses, as above. \par \par He last saw Dr. Stewart 6-7 months ago.  He last saw Dr. Maza 4-5 months ago.  He last saw dental 10 months ago.  He last saw optho 4 months ago.  He last saw his PCP 4-5 months ago.  He does not have future follow-up schedule with any of these providers.  Today, he reports feeling well and is without complaints.  He states his chest pain resolved.  He continues to use his nasal spray. \par \par 11/21/19 - Follow-up\[facility] [place]  # [id number]\par Mr. Sinha returns for routine follow-up.  He was last seen on 5/21/19.  Plan was for repeat imaging in 6 months, f/u with Dr. Maza, Dr. Stewart, PCP, endocrine and optho.  \par \par MRI orbit/face/neck with and without contrast 11/12/19 ([facility]) - IMPRESSION: A hypoenhancing lesion is seen within the left sphenoid sinus with has slightly decreased in size.  Abnormal enhancement is seen within the pterygopalatine fossa bilaterally which is consistent with tumor involvement.  This is unchanged.  Smooth symmetric thickening of the mucosa of the posterior nasopharynx has increased since the prior exam.  This is nonspecific.  \par \par He last saw Dr. Maza 3 months ago and is scheduled to see him again on 11/27.  He states he saw Dr. Osullivan 3 months ago, although the last documented encounter was in April.  He last saw his PCP 3 months ago.  He states he has never seen an endocrinologist  He last saw optho on 11/12.  Today, he reports feeling much better since his last visit here.  He notes that he still feels like he doesn’t have all his strength back, but overall feels well.  He states he felt a headache coming on today, but it went away.  He otherwise denies headaches.  He reports left eye blurry vision has improved but it is not like his right eye.  He also reports an episode of midsternal chest pain 3 days ago while at rest, lasting 5-6 minutes, resolved spontaneously.  He did not seek evaluation.  He is otherwise without complaints. \par \par 5/21/19 - Follow-up\[facility]  #[id number] - [place]\par Mr. Sinha returns today for routine follow-up.  He was last seen here on 2/4/19.  Plan at that time was for repeat imaging in 3 months, f/u with Dr. Stewart and formal ophthalmology follow-up. \par \par MRI orbit face and neck with and without contrast 5/6/19 - Impression: There is evidence of response to interval therapy with reduction in volume of pathologic enhancement at the central skull base.  Residual, though diminished, enhancement is present within the pterygopalatine fossae, skull base foramina, cavernous sinuses and Meckel's caves.  \par \par CT neck with contrast 5/6/19 - Impression: no cervical lymphadenopathy \par \par He last saw Dr. Osullivan on 4/27/19 and the plan was to follow up. He last saw Dr. Maza yesterday for test results. Nothing else was discussed this visit per the daughter. He has an appt with optho in August.  Today, he reports feeling very tired and has weakness is his legs. He uses a cane most of the time but did not bring today. He did not feel like the weakness was related to not using the cane. His Bp remains poorly controlled 139/103 on the left and 157/112 on the right. Encouraged follow up as per the pt his bp medication was prescribed by a DrJorge in [place]. He continues to have blurriness in the left eye. No complaints of chest pain, sob or nausea. \par \par \par 2/4/19-Follow up\[facility]  # [id number] ([place]ickie). \par At his last visit he notes nasal congestion and was planned for sinonasal debridement with Dr. Stewart. He was advised to follow up with Dr. Stewart and return post MRI. He has not yet seen Dr Osullivan. He also had poor blood pressure control including today. Daughter stated that he did take his Losartin this morning as prescribed. \par \par He underwent FESS and lysis of adhesions on 10/9/18. At his last visit with Dr. Stewart 10/22/18 he continued to complain of nasal congestion but noted improvement in headache and nose pain. He was advised to perform nasal irrigations irrigations and use Afrin and saline nasal sprays\par \par MRI orbits w/wo contrast was ordered by Dr. Maza and done at [facility] 12/13/18, showed: extensive heterogenous signal throughout clivus and cavernous sinuses/sphenoid sinuses; mass cannot be excluded. \par \par PET/CT done at [facility] 1/28/19 showed: \par Impression: \par 1. There are two small mildly hypermetabolic lymph nodes in the left neck. (There is a 1.0 x 0.5 cm hypermetabolic left level II lymph node with SUV of 3.3. and 0.9 x 0.4 cm lymph node in the left neck at level III is mildly FDG avid, with SUV of 2.4.)  They could represent metastatic disease.\par 2. The tissue in the skull base is not FDG avid. \par \par The radiology findings and case were reviewed at H&N tumor board today with recommendation for MRI imaging of the sinus and CT of neck/chest going forward; no PET scans. There was no obvious evidence of disease. Recommended follow up MRI in 3-4mos.\par \par Today he states he feels well overall. Main complaint is peripheral neuropathy. His left eye is still limited with blurry vision and leftward gaze palsy, he saw Ophtho at [facility] on 1/30/19 and will follow up there. Denies headache or sinus pressure. Denies facial pain or nasal pain. tolerating diet well. Appetite good,. no difficulties swallowing.  Patient states he does have significant constipation, not currently taking anything for that situation. Increased fluids encouraged to ensure hydration. Left eye vision unchanged. Denies changes in hearing or ringing in ears. \par \par \par ONCOLOGY HISTORY\par [place]  # [id number], [id number]\par Mr. 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 He presented with dizziness, vomiting, blurry vision of Left eye to [facility] in early May.  Imaging showed sellar lesion, extending into sphenoid cavernous sinus prepontine regions.  He was transferred to [facility] on 05/10 for biopsy. CT sinuses 5/13/18  showed destructive central skull base mass.\par \par MR sella 5/13/18 showed showed destructive central skull base components within the clivus,  the bilateral sphenoid sinuses and the bilateral pterygopalatine fossae.  The sella floor appears intact on concurrent CT imaging, suggesting extra  pituitary origin. 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  \par \par On 5/17/18 he underwent  biopsies of his anterior skull  and skull base lesions by Dr Stewart. Pathology of anterior skull lesions showed infiltrating carcinoma, favoring adenoid cystic.  Sellar and anterior lesions positive for adenoid cystic carcinoma with neural invasion. immunostains support adenoid cystic carcinoma. Positive for , SMActin and p63.\par \par His case was reviewed in multidisciplinary tumor board with recommendation for radiation therapy, possibly with concurrent chemotherapy. \par \par At the time of initial consult he c/o of a left sided headache 5/10 and fatigue. He has double vision on left gaze. He also had right-sided tinnitus. Denied throat pain or dysphagia.\par \par Notably, he presented to to the ED at [facility] with dizziness and vomiting. He was treated with antihypertensive medication and d/c home. He notes blurry vision in left eye. He recently saw eye doctor 6/1/18 and diagnosed with glaucoma and ordered to use eye gtts. \par \par 6/8/18 Mr. Robles presents today for follow up. [facility]  #[id number]\par He underwent CT simulation for radiation therapy today, which he tolerated well. He notes pain 5/10 to his head at most times. He takes ibuprofen 600 mg QHS, without much relief. \par He underwent a PET scan yesterday. No evidence of distant metastasis. He underwent an MRI for further characterization of his disease site as well. Final reports of both scans are pending. He saw Med Onc Dr. Link Meneses and plan is for concurrent weekly chemotherapy. \par \par 6/21/18- Mr. Sinha presents today for follow up. Today he is feeling well. Notes headaches have improved after starting dexamethasone 2mg daily.  Still with visual deficits. \par He is here to discuss again the rationale for treatment since he thinks his oncologist told him the disease would worsen after treatment. \par

## 2020-10-01 NOTE — PHYSICAL EXAM
[PERRL With Normal Accommodation] : pupils were equal in size, round, reactive to light [Heart Rate And Rhythm] : heart rate and rhythm were normal [Heart Sounds] : normal S1 and S2 [Cervical Lymph Nodes Enlarged Posterior Bilaterally] : posterior cervical [Cervical Lymph Nodes Enlarged Anterior Bilaterally] : anterior cervical [Supraclavicular Lymph Nodes Enlarged Bilaterally] : supraclavicular [Skin Color & Pigmentation] : normal skin color and pigmentation [Oriented To Time, Place, And Person] : oriented to person, place, and time [Normal] : the ears, nose and oropharynx were normal in appearance [de-identified] : slight left eye lateral gaze palsy - stable.

## 2020-10-01 NOTE — REVIEW OF SYSTEMS
[Patient Intake Form Reviewed] : Patient intake form was reviewed [Constipation: Grade 0] : Constipation: Grade 0 [Diarrhea: Grade 0] : Diarrhea: Grade 0 [Dysphagia: Grade 0] : Dysphagia: Grade 0 [Esophagitis: Grade 0] : Esophagitis: Grade 0 [Blurred Vision: Grade 0] : Blurred Vision: Grade 0 [Mucositis Oral: Grade 0] : Mucositis Oral: Grade 0  [Xerostomia: Grade 0] : Xerostomia: Grade 0 [Oral Pain: Grade 0] : Oral Pain: Grade 0 [Cognitive Disturbance: Grade 0] : Cognitive Disturbance: Grade 0 [Dizziness: Grade 0] : Dizziness: Grade 0  [Headache: Grade 0] : Headache: Grade 0 [Dermatitis Radiation: Grade 0] : Dermatitis Radiation: Grade 0 [Negative] : Cardiovascular [Fatigue: Grade 0] : Fatigue: Grade 0 [de-identified] : decreased vision left eye - unchanged

## 2020-10-05 LAB — TSH SERPL-ACNC: 1.13 UIU/ML

## 2021-03-31 ENCOUNTER — NON-APPOINTMENT (OUTPATIENT)
Age: 61
End: 2021-03-31

## 2021-04-09 ENCOUNTER — APPOINTMENT (OUTPATIENT)
Dept: MRI IMAGING | Facility: HOSPITAL | Age: 61
End: 2021-04-09

## 2021-05-13 ENCOUNTER — APPOINTMENT (OUTPATIENT)
Dept: RADIATION ONCOLOGY | Facility: CLINIC | Age: 61
End: 2021-05-13

## 2021-07-08 ENCOUNTER — RESULT REVIEW (OUTPATIENT)
Age: 61
End: 2021-07-08

## 2021-07-08 ENCOUNTER — OUTPATIENT (OUTPATIENT)
Dept: OUTPATIENT SERVICES | Facility: HOSPITAL | Age: 61
LOS: 1 days | End: 2021-07-08
Payer: MEDICARE

## 2021-07-08 ENCOUNTER — APPOINTMENT (OUTPATIENT)
Dept: MRI IMAGING | Facility: HOSPITAL | Age: 61
End: 2021-07-08
Payer: MEDICARE

## 2021-07-08 DIAGNOSIS — Z90.49 ACQUIRED ABSENCE OF OTHER SPECIFIED PARTS OF DIGESTIVE TRACT: Chronic | ICD-10-CM

## 2021-07-08 PROCEDURE — 70543 MRI ORBT/FAC/NCK W/O &W/DYE: CPT

## 2021-07-08 PROCEDURE — A9585: CPT

## 2021-07-08 PROCEDURE — 70543 MRI ORBT/FAC/NCK W/O &W/DYE: CPT | Mod: 26

## 2021-07-13 NOTE — REASON FOR VISIT
[Routine Follow-Up] : routine follow-up visit for [Head and Neck Cancer] : head and neck cancer [Family Member] : family member [[employer] ] : provided by [employer]

## 2021-07-20 ENCOUNTER — APPOINTMENT (OUTPATIENT)
Dept: RADIATION ONCOLOGY | Facility: CLINIC | Age: 61
End: 2021-07-20
Payer: MEDICARE

## 2021-07-20 VITALS
WEIGHT: 245 LBS | TEMPERATURE: 98 F | DIASTOLIC BLOOD PRESSURE: 84 MMHG | SYSTOLIC BLOOD PRESSURE: 143 MMHG | HEIGHT: 70 IN | OXYGEN SATURATION: 98 % | BODY MASS INDEX: 35.07 KG/M2 | RESPIRATION RATE: 18 BRPM | HEART RATE: 77 BPM

## 2021-07-20 PROCEDURE — 99214 OFFICE O/P EST MOD 30 MIN: CPT

## 2021-07-20 NOTE — HISTORY OF PRESENT ILLNESS
[FreeTextEntry1] : Mr. Sinah completed radiation therapy to the skull base/ sinus for a total of 6000 cGy/ 30 fractions from 18 to 18. He had an uncomplicated treatment course. Of note, he did have significant hyperglycemia that required hospital admission for management. \par \par 21- FOLLOW UP\par  returns for routine follow up after 10/1/20.  Karen Kendall # [id number] \par \par Missed his appt in  one of his brothers . \par \par Today he reports he feels fine and has no other complaints. Continues to have left eye slight lateral palsy but overall is happy with vision. Seen by PCP Jennifer Engel PA-C on the 21 and ophthalmologist (doesn't recall the name).\par \par 2021 - MRI OF FACE AND NECK\par MRI shows slow interval enlargement of lobular soft tissue in the region of the inter sphenoid septum on comparison with prior studies dating back to 3/2020, felt suspicious for persistent/recurrent tumor. Other findings are stable.\par \par ____________________________________________\par 3/31/21- Telephone Visit\par Called patient using  services ( ID # [id number]) and left detailed message of MRI appointment  at 4pm and to follow up on 21 at 11:30am.\par Unable to reach patient on multiple attempts. He has never called us back to reschedule appointments.\par \par 10/1/20 - Follow-up\[facility]  # \par Mr. Sinha returns for routine follow-up.  He was last seen on 19.  Plan was for MRI in 6 months, f/u with Dr. Stewart for nasopharyngoscopy, f/u with PCP regarding chest pain, f/u with dental, Dr. Maza, and optho, nasal sprays as per Dr. Stewart.  Most recent MRI as follows:\par \par MRI orbit/face/neck 20 - Stable sites of residual enhancement at the central skull base and sphenoid sinuses, as above. \par \par He last saw Dr. Stewart 6-7 months ago.  He last saw Dr. Maza 4-5 months ago.  He last saw dental 10 months ago.  He last saw optho 4 months ago.  He last saw his PCP 4-5 months ago.  He does not have future follow-up schedule with any of these providers.  Today, he reports feeling well and is without complaints.  He states his chest pain resolved.  He continues to use his nasal spray. \par \[id number]\par Mr. Sinha returns for routine follow-up.  He was last seen on 19.  Plan was for repeat imaging in 6 months, f/u with Dr. Maza, Dr. Stewart, PCP, endocrine and optho.  \par \par MRI orbit/face/neck with and without contrast 19 ([facility]) - IMPRESSION: A hypoenhancing lesion is seen within the left sphenoid sinus with has slightly decreased in size.  Abnormal enhancement is seen within the pterygopalatine fossa bilaterally which is consistent with tumor involvement.  This is unchanged.  Smooth symmetric thickening of the mucosa of the posterior nasopharynx has increased since the prior exam.  This is nonspecific.  \par \par He last saw Dr. Maza 3 months ago and is scheduled to see him again on .  He states he saw Dr. Osullivan 3 months ago, although the last documented encounter was in April.  He last saw his PCP 3 months ago.  He states he has never seen an endocrinologist  He last saw optho on .  Today, he reports feeling much better since his last visit here.  He notes that he still feels like he doesn’t have all his strength back, but overall feels well.  He states he felt a headache coming on today, but it went away.  He otherwise denies headaches.  He reports left eye blurry vision has improved but it is not like his right eye.  He also reports an episode of midsternal chest pain 3 days ago while at rest, lasting 5-6 minutes, resolved spontaneously.  He did not seek evaluation.  He is otherwise without complaints. \par \par [id number] [place]\par Mr. Sinha returns today for routine follow-up.  He was last seen here on 19.  Plan at that time was for repeat imaging in 3 months, f/u with Dr. Stewart and formal ophthalmology follow-up. \par \par MRI orbit face and neck with and without contrast 19 - Impression: There is evidence of response to interval therapy with reduction in volume of pathologic enhancement at the central skull base.  Residual, though diminished, enhancement is present within the pterygopalatine fossae, skull base foramina, cavernous sinuses and Meckel's caves.  \par \par CT neck with contrast 19 - Impression: no cervical lymphadenopathy \par \par He last saw Dr. Osullivan on 19 and the plan was to follow up. He last saw Dr. Maza yesterday for test results. Nothing else was discussed this visit per the daughter. He has an appt with [facility] in August.  Today, he reports feeling very tired and has weakness is his legs. He uses a cane most of the time but did not bring today. He did not feel like the weakness was related to not using the cane. His Bp remains poorly controlled 139/103 on the left and 157/112 on the right. Encouraged follow up as per the pt his bp medication was prescribed by a DrJorge in [place]. He continues to have blurriness in the left eye. No complaints of chest pain, sob or nausea. \par \par \par 19-Follow up\[facility]  # [id number] (Vickie). \par At his last visit he notes nasal congestion and was planned for sinonasal debridement with Dr. Stewart. He was advised to follow up with Dr. Stewart and return post MRI. He has not yet seen Dr Osullivan. He also had poor blood pressure control including today. Daughter stated that he did take his Losartin this morning as prescribed. \par \par He underwent FESS and lysis of adhesions on 10/9/18. At his last visit with Dr. Stewart 10/22/18 he continued to complain of nasal congestion but noted improvement in headache and nose pain. He was advised to perform nasal irrigations irrigations and use Afrin and saline nasal sprays\par \par MRI orbits w/wo contrast was ordered by Dr. Maza and done at [facility] 18, showed: extensive heterogenous signal throughout clivus and cavernous sinuses/sphenoid sinuses; mass cannot be excluded. \par \par PET/CT done at [facility] 19 showed: \par Impression: \par 1. There are two small mildly hypermetabolic lymph nodes in the left neck. (There is a 1.0 x 0.5 cm hypermetabolic left level II lymph node with SUV of 3.3. and 0.9 x 0.4 cm lymph node in the left neck at level III is mildly FDG avid, with SUV of 2.4.)  They could represent metastatic disease.\par 2. The tissue in the skull base is not FDG avid. \par \par The radiology findings and case were reviewed at H&N tumor board today with recommendation for MRI imaging of the sinus and CT of neck/chest going forward; no PET scans. There was no obvious evidence of disease. Recommended follow up MRI in 3-4mos.\par \par Today he states he feels well overall. Main complaint is peripheral neuropathy. His left eye is still limited with blurry vision and leftward gaze palsy, he saw Ophtho at [facility] on 19 and will follow up there. Denies headache or sinus pressure. Denies facial pain or nasal pain. tolerating diet well. Appetite good,. no difficulties swallowing.  Patient states he does have significant constipation, not currently taking anything for that situation. Increased fluids encouraged to ensure hydration. Left eye vision unchanged. Denies changes in hearing or ringing in ears. \par \par \par ONCOLOGY HISTORY\par [facility]  # [id number], [id number]\par Mr. Travis Sinha is a 57 year old male who presented initially on 18 with adenoid cystic carcinoma with neural invasion. He presented with dizziness, vomiting, blurry vision of Left eye to [facility] in early May.  Imaging showed sellar lesion, extending into sphenoid cavernous sinus prepontine regions.  He was transferred to [facility] on 05/10 for biopsy. CT sinuses 18  showed destructive central skull base mass.\par \par MR sella 18 showed showed destructive central skull base components within the clivus,  the bilateral sphenoid sinuses and the bilateral pterygopalatine fossae.  The sella floor appears intact on concurrent CT imaging, suggesting extra  pituitary origin. 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  \par \par On 18 he underwent  biopsies of his anterior skull  and skull base lesions by Dr Stewart. Pathology of anterior skull lesions showed infiltrating carcinoma, favoring adenoid cystic.  Sellar and anterior lesions positive for adenoid cystic carcinoma with neural invasion. immunostains support adenoid cystic carcinoma. Positive for , SMActin and p63.\par \par His case was reviewed in multidisciplinary tumor board with recommendation for radiation therapy, possibly with concurrent chemotherapy. \par \par At the time of initial consult he c/o of a left sided headache 5/10 and fatigue. He has double vision on left gaze. He also had right-sided tinnitus. Denied throat pain or dysphagia.\par \par Notably, he presented to to the ED at [facility] with dizziness and vomiting. He was treated with antihypertensive medication and d/c home. He notes blurry vision in left eye. He recently saw eye doctor 18 and diagnosed with glaucoma and ordered to use eye gtts. \par \par 18 Mr. Robles presents today for follow up. [facility]  #[id number]\par He underwent CT simulation for radiation therapy today, which he tolerated well. He notes pain 5/10 to his head at most times. He takes ibuprofen 600 mg QHS, without much relief. \par He underwent a PET scan yesterday. No evidence of distant metastasis. He underwent an MRI for further characterization of his disease site as well. Final reports of both scans are pending. He saw Med Onc Dr. Maza kathieday and plan is for concurrent weekly chemotherapy. \par \par 18- Mr. Sinha presents today for follow up. Today he is feeling well. Notes headaches have improved after starting dexamethasone 2mg daily.  Still with visual deficits. \par He is here to discuss again the rationale for treatment since he thinks his oncologist told him the disease would worsen after treatment. \par

## 2021-07-20 NOTE — PHYSICAL EXAM
[PERRL With Normal Accommodation] : pupils were equal in size, round, reactive to light [Heart Rate And Rhythm] : heart rate and rhythm were normal [Heart Sounds] : normal S1 and S2 [Cervical Lymph Nodes Enlarged Posterior Bilaterally] : posterior cervical [Cervical Lymph Nodes Enlarged Anterior Bilaterally] : anterior cervical [Supraclavicular Lymph Nodes Enlarged Bilaterally] : supraclavicular [Skin Color & Pigmentation] : normal skin color and pigmentation [Oriented To Time, Place, And Person] : oriented to person, place, and time [Normal] : normoactive bowel sounds, soft and nontender, no hepatosplenomegaly or masses appreciated [de-identified] : slight left eye lateral gaze palsy - stable.

## 2021-07-20 NOTE — REVIEW OF SYSTEMS
[Patient Intake Form Reviewed] : Patient intake form was reviewed [Negative] : Neurological [Constipation: Grade 0] : Constipation: Grade 0 [Diarrhea: Grade 0] : Diarrhea: Grade 0 [Dysphagia: Grade 0] : Dysphagia: Grade 0 [Esophagitis: Grade 0] : Esophagitis: Grade 0 [Fatigue: Grade 0] : Fatigue: Grade 0 [Blurred Vision: Grade 0] : Blurred Vision: Grade 0 [Mucositis Oral: Grade 0] : Mucositis Oral: Grade 0  [Xerostomia: Grade 0] : Xerostomia: Grade 0 [Oral Pain: Grade 0] : Oral Pain: Grade 0 [Cognitive Disturbance: Grade 0] : Cognitive Disturbance: Grade 0 [Dizziness: Grade 0] : Dizziness: Grade 0  [Headache: Grade 0] : Headache: Grade 0 [Dermatitis Radiation: Grade 0] : Dermatitis Radiation: Grade 0 [de-identified] : decreased vision left eye - unchanged

## 2021-07-20 NOTE — ASSESSMENT
[Work-up necessary to assess local, regional or metastatic recurrence] : Work-up necessary to assess local, regional or metastatic recurrence [Local recurrence] : Local recurrence

## 2021-08-20 ENCOUNTER — OUTPATIENT (OUTPATIENT)
Dept: OUTPATIENT SERVICES | Facility: HOSPITAL | Age: 61
LOS: 1 days | End: 2021-08-20
Payer: MEDICARE

## 2021-08-20 ENCOUNTER — APPOINTMENT (OUTPATIENT)
Dept: CT IMAGING | Facility: HOSPITAL | Age: 61
End: 2021-08-20
Payer: MEDICARE

## 2021-08-20 DIAGNOSIS — Z90.49 ACQUIRED ABSENCE OF OTHER SPECIFIED PARTS OF DIGESTIVE TRACT: Chronic | ICD-10-CM

## 2021-08-20 PROCEDURE — 70486 CT MAXILLOFACIAL W/O DYE: CPT | Mod: 26

## 2021-08-20 PROCEDURE — 70486 CT MAXILLOFACIAL W/O DYE: CPT

## 2021-08-23 ENCOUNTER — TRANSCRIPTION ENCOUNTER (OUTPATIENT)
Age: 61
End: 2021-08-23

## 2021-08-23 VITALS
HEART RATE: 83 BPM | OXYGEN SATURATION: 98 % | WEIGHT: 241.63 LBS | HEIGHT: 70 IN | RESPIRATION RATE: 18 BRPM | SYSTOLIC BLOOD PRESSURE: 137 MMHG | TEMPERATURE: 99 F | DIASTOLIC BLOOD PRESSURE: 93 MMHG

## 2021-08-23 NOTE — PATIENT PROFILE ADULT - 
ADDITIONAL INFORMATION
Reported 60lbs weight loss over the past year with poor PO intake and constipation. severe malnutrition  encourage po, protein supplements  Last colonoscopy about 5 years ago per wife was within normal.   currently, not candidate for endoscopy given worsening corneal melting and concern for increased pressure during procedure  CEA wnl  MRI liver shows liver cysts  see above Rohit vaccine x 2 in

## 2021-08-24 ENCOUNTER — INPATIENT (INPATIENT)
Facility: HOSPITAL | Age: 61
LOS: 0 days | Discharge: HOME | DRG: 182 | End: 2021-08-25
Attending: SPECIALIST | Admitting: SPECIALIST
Payer: MEDICARE

## 2021-08-24 ENCOUNTER — TRANSCRIPTION ENCOUNTER (OUTPATIENT)
Age: 61
End: 2021-08-24

## 2021-08-24 ENCOUNTER — RESULT REVIEW (OUTPATIENT)
Age: 61
End: 2021-08-24

## 2021-08-24 DIAGNOSIS — Z98.890 OTHER SPECIFIED POSTPROCEDURAL STATES: Chronic | ICD-10-CM

## 2021-08-24 DIAGNOSIS — Z90.49 ACQUIRED ABSENCE OF OTHER SPECIFIED PARTS OF DIGESTIVE TRACT: Chronic | ICD-10-CM

## 2021-08-24 DIAGNOSIS — H26.9 UNSPECIFIED CATARACT: Chronic | ICD-10-CM

## 2021-08-24 LAB
GRAM STN FLD: SIGNIFICANT CHANGE UP
GRAM STN FLD: SIGNIFICANT CHANGE UP
SPECIMEN SOURCE: SIGNIFICANT CHANGE UP
SPECIMEN SOURCE: SIGNIFICANT CHANGE UP

## 2021-08-24 PROCEDURE — 88305 TISSUE EXAM BY PATHOLOGIST: CPT | Mod: 26

## 2021-08-24 PROCEDURE — 88331 PATH CONSLTJ SURG 1 BLK 1SPC: CPT | Mod: 26

## 2021-08-24 RX ORDER — LATANOPROST 0.05 MG/ML
1 SOLUTION/ DROPS OPHTHALMIC; TOPICAL AT BEDTIME
Refills: 0 | Status: DISCONTINUED | OUTPATIENT
Start: 2021-08-24 | End: 2021-08-25

## 2021-08-24 RX ORDER — LOSARTAN POTASSIUM 100 MG/1
100 TABLET, FILM COATED ORAL DAILY
Refills: 0 | Status: DISCONTINUED | OUTPATIENT
Start: 2021-08-24 | End: 2021-08-25

## 2021-08-24 RX ORDER — PANTOPRAZOLE SODIUM 20 MG/1
40 TABLET, DELAYED RELEASE ORAL
Refills: 0 | Status: DISCONTINUED | OUTPATIENT
Start: 2021-08-24 | End: 2021-08-25

## 2021-08-24 RX ORDER — OXYCODONE AND ACETAMINOPHEN 5; 325 MG/1; MG/1
1 TABLET ORAL EVERY 4 HOURS
Refills: 0 | Status: DISCONTINUED | OUTPATIENT
Start: 2021-08-24 | End: 2021-08-25

## 2021-08-24 RX ORDER — AMLODIPINE BESYLATE 2.5 MG/1
5 TABLET ORAL DAILY
Refills: 0 | Status: DISCONTINUED | OUTPATIENT
Start: 2021-08-24 | End: 2021-08-25

## 2021-08-24 RX ORDER — ACETAMINOPHEN 500 MG
650 TABLET ORAL EVERY 6 HOURS
Refills: 0 | Status: DISCONTINUED | OUTPATIENT
Start: 2021-08-24 | End: 2021-08-25

## 2021-08-24 RX ORDER — SODIUM CHLORIDE 9 MG/ML
1000 INJECTION, SOLUTION INTRAVENOUS
Refills: 0 | Status: DISCONTINUED | OUTPATIENT
Start: 2021-08-24 | End: 2021-08-25

## 2021-08-24 RX ORDER — TIMOLOL 0.5 %
1 DROPS OPHTHALMIC (EYE) AT BEDTIME
Refills: 0 | Status: DISCONTINUED | OUTPATIENT
Start: 2021-08-24 | End: 2021-08-25

## 2021-08-24 RX ADMIN — LATANOPROST 1 DROP(S): 0.05 SOLUTION/ DROPS OPHTHALMIC; TOPICAL at 23:36

## 2021-08-24 RX ADMIN — Medication 1 DROP(S): at 23:36

## 2021-08-24 RX ADMIN — Medication 100 MILLIGRAM(S): at 23:36

## 2021-08-24 NOTE — BRIEF OPERATIVE NOTE - OPERATION/FINDINGS
Multiple bilateral synechiae taken down to provide visibility to the operative site. Biopsy of the sphenoid/clival mass taken and sent for frozen and permanent pathology. There was purulent drainage noted bilaterally stemming from the maxillary sinus requiring cultures and a bilateral maxillary antrostomies performed. Splints and danay packings were put in place prior to extubation.

## 2021-08-24 NOTE — BRIEF OPERATIVE NOTE - ELECTIVE PROCEDURE
Patient: Luanne Sun    Procedure Summary     Date:  09/24/18 Room / Location:  [facility] OR 4 /  [facility] OR    Anesthesia Start:  1143 Anesthesia Stop:  1217    Procedure:  LOOP ELECTROCAUTERY EXCISION PROCEDURE BIOPSY OF CERVIX (N/A Vagina) Diagnosis:       HGSIL (high grade squamous intraepithelial lesion) on Pap smear of cervix      (HGSIL (high grade squamous intraepithelial lesion) on Pap smear of cervix [R87.613])    Surgeon:  Sorin Bower MD Provider:  Gretchen Branch CRNA    Anesthesia Type:  general ASA Status:  2          Anesthesia Type: general  Last vitals  BP   114/88 (09/24/18 1250)   Temp   98.7 °F (37.1 °C) (09/24/18 1250)   Pulse   73 (09/24/18 1250)   Resp   15 (09/24/18 1250)     SpO2   98 % (09/24/18 1250)     Post Anesthesia Care and Evaluation    Patient location during evaluation: bedside  Patient participation: complete - patient participated  Level of consciousness: awake and alert  Pain score: 0  Pain management: adequate  Airway patency: patent  Anesthetic complications: No anesthetic complications  PONV Status: none  Cardiovascular status: acceptable  Respiratory status: acceptable  Hydration status: acceptable      
Yes

## 2021-08-24 NOTE — BRIEF OPERATIVE NOTE - SPECIMENS
1. Clival mass (frozen) 2. Clival mass (permanent) 3. Left maxillary sinus (culture) 4. Right maxillary sinus (culture)

## 2021-08-24 NOTE — BRIEF OPERATIVE NOTE - NSICDXBRIEFPROCEDURE_GEN_ALL_CORE_FT
PROCEDURES:  Biopsy, mass, skull base, endoscopic, endonasal approach 24-Aug-2021 14:28:36  Isabella Franco  Endoscopic maxillary antrostomy 24-Aug-2021 14:29:41  Isabella Franco

## 2021-08-25 ENCOUNTER — TRANSCRIPTION ENCOUNTER (OUTPATIENT)
Age: 61
End: 2021-08-25

## 2021-08-25 VITALS
HEART RATE: 72 BPM | DIASTOLIC BLOOD PRESSURE: 82 MMHG | OXYGEN SATURATION: 96 % | RESPIRATION RATE: 17 BRPM | SYSTOLIC BLOOD PRESSURE: 125 MMHG | TEMPERATURE: 98 F

## 2021-08-25 LAB
COVID-19 SPIKE DOMAIN AB INTERP: POSITIVE
COVID-19 SPIKE DOMAIN ANTIBODY RESULT: >250 U/ML — HIGH
SARS-COV-2 IGG+IGM SERPL QL IA: >250 U/ML — HIGH
SARS-COV-2 IGG+IGM SERPL QL IA: POSITIVE

## 2021-08-25 RX ORDER — TIMOLOL 0.5 %
1 DROPS OPHTHALMIC (EYE)
Qty: 0 | Refills: 0 | DISCHARGE
Start: 2021-08-25

## 2021-08-25 RX ORDER — SODIUM CHLORIDE 0.65 %
1 AEROSOL, SPRAY (ML) NASAL
Qty: 28 | Refills: 0
Start: 2021-08-25 | End: 2021-09-07

## 2021-08-25 RX ORDER — BACITRACIN ZINC 500 UNIT/G
1 OINTMENT IN PACKET (EA) TOPICAL
Qty: 1 | Refills: 0
Start: 2021-08-25 | End: 2021-08-30

## 2021-08-25 RX ADMIN — Medication 100 MILLIGRAM(S): at 06:27

## 2021-08-25 RX ADMIN — LOSARTAN POTASSIUM 100 MILLIGRAM(S): 100 TABLET, FILM COATED ORAL at 06:26

## 2021-08-25 RX ADMIN — AMLODIPINE BESYLATE 5 MILLIGRAM(S): 2.5 TABLET ORAL at 06:27

## 2021-08-25 RX ADMIN — PANTOPRAZOLE SODIUM 40 MILLIGRAM(S): 20 TABLET, DELAYED RELEASE ORAL at 06:29

## 2021-08-25 NOTE — DISCHARGE NOTE NURSING/CASE MANAGEMENT/SOCIAL WORK - PATIENT PORTAL LINK FT
You can access the FollowMyHealth Patient Portal offered by [facility] by registering at the following website: [email or web address]. By joining [facility]’s FollowMyHealth portal, you will also be able to view your health information using other applications (apps) compatible with our system.

## 2021-08-25 NOTE — ASU DISCHARGE PLAN (ADULT/PEDIATRIC) - "IF YOU OR YOUR GUARDIAN/FAMILY IS A SMOKER, IT IS IMPORTANT FOR YOUR HEALTH TO STOP SMOKING. PLEASE BE AWARE THAT SECOND HAND SMOKE IS ALSO HARMFUL."
Discussed with mom the diagnosis of pneumonia.      To go ahead and continue with Omnicef for the 10 days.  Also to push fluids, keep with albuterol as a preventative right now until she is rechecked in 48 hours.           Statement Selected

## 2021-08-25 NOTE — ASU DISCHARGE PLAN (ADULT/PEDIATRIC) - ASU DC SPECIAL INSTRUCTIONSFT
DO NOT blow your nose for at least two weeks. DO NOT forcibly spit for one week. DO NOT smoke or use smokeless tobacco; smoking greatly inhibits the healing process, especially in the sinuses. Sneeze with your MOUTH OPEN. If the urge to sneeze arises, do not sneeze through your nose and avoid pinching nostrils. Drink without a straw for one week. Avoid swimming for one month and strenuous exercise (e.g. heavy lifting) for one week. Slight bleeding from the nose is not uncommon and may occur for several days after surgery.    May use a mustache dressing PRN for drainage.

## 2021-08-25 NOTE — DISCHARGE NOTE NURSING/CASE MANAGEMENT/SOCIAL WORK - NSDCPEFALRISK_GEN_ALL_CORE
For information on Fall & injury Prevention, visit [email or web address]

## 2021-08-25 NOTE — ASU DISCHARGE PLAN (ADULT/PEDIATRIC) - CARE PROVIDER_API CALL
Wei Stewart)  Otolaryngology  [address] [place] [address]  Phone: [phone]  Fax: [phone]  Follow Up Time:

## 2021-08-25 NOTE — ASU DISCHARGE PLAN (ADULT/PEDIATRIC) - CALL YOUR DOCTOR IF YOU HAVE ANY OF THE FOLLOWING:
101/Bleeding that does not stop/Fever greater than (need to indicate Fahrenheit or Celsius)/Wound/Surgical Site with redness, or foul smelling discharge or pus/Nausea and vomiting that does not stop

## 2021-08-26 LAB
-  CEFAZOLIN: SIGNIFICANT CHANGE UP
-  CEFAZOLIN: SIGNIFICANT CHANGE UP
-  CLINDAMYCIN: SIGNIFICANT CHANGE UP
-  CLINDAMYCIN: SIGNIFICANT CHANGE UP
-  ERYTHROMYCIN: SIGNIFICANT CHANGE UP
-  ERYTHROMYCIN: SIGNIFICANT CHANGE UP
-  LINEZOLID: SIGNIFICANT CHANGE UP
-  LINEZOLID: SIGNIFICANT CHANGE UP
-  OXACILLIN: SIGNIFICANT CHANGE UP
-  OXACILLIN: SIGNIFICANT CHANGE UP
-  RIFAMPIN: SIGNIFICANT CHANGE UP
-  RIFAMPIN: SIGNIFICANT CHANGE UP
-  TRIMETHOPRIM/SULFAMETHOXAZOLE: SIGNIFICANT CHANGE UP
-  TRIMETHOPRIM/SULFAMETHOXAZOLE: SIGNIFICANT CHANGE UP
-  VANCOMYCIN: SIGNIFICANT CHANGE UP
-  VANCOMYCIN: SIGNIFICANT CHANGE UP
METHOD TYPE: SIGNIFICANT CHANGE UP
METHOD TYPE: SIGNIFICANT CHANGE UP

## 2021-08-27 LAB
-  AMPICILLIN: SIGNIFICANT CHANGE UP
-  AMPICILLIN: SIGNIFICANT CHANGE UP
-  AZTREONAM: SIGNIFICANT CHANGE UP
-  CEFEPIME: SIGNIFICANT CHANGE UP
-  CEFTRIAXONE: SIGNIFICANT CHANGE UP
-  CIPROFLOXACIN: SIGNIFICANT CHANGE UP
-  CLINDAMYCIN: SIGNIFICANT CHANGE UP
-  ERYTHROMYCIN: SIGNIFICANT CHANGE UP
-  GENTAMICIN: SIGNIFICANT CHANGE UP
-  LEVOFLOXACIN: SIGNIFICANT CHANGE UP
-  PENICILLIN: SIGNIFICANT CHANGE UP
-  PIPERACILLIN/TAZOBACTAM: SIGNIFICANT CHANGE UP
-  TOBRAMYCIN: SIGNIFICANT CHANGE UP
-  VANCOMYCIN: SIGNIFICANT CHANGE UP
CULTURE RESULTS: SIGNIFICANT CHANGE UP
CULTURE RESULTS: SIGNIFICANT CHANGE UP
METHOD TYPE: SIGNIFICANT CHANGE UP
ORGANISM # SPEC MICROSCOPIC CNT: SIGNIFICANT CHANGE UP
SPECIMEN SOURCE: SIGNIFICANT CHANGE UP
SPECIMEN SOURCE: SIGNIFICANT CHANGE UP
SURGICAL PATHOLOGY STUDY: SIGNIFICANT CHANGE UP

## 2021-08-28 DIAGNOSIS — D38.5 NEOPLASM OF UNCERTAIN BEHAVIOR OF OTHER RESPIRATORY ORGANS: ICD-10-CM

## 2021-08-28 DIAGNOSIS — Z85.89 PERSONAL HISTORY OF MALIGNANT NEOPLASM OF OTHER ORGANS AND SYSTEMS: ICD-10-CM

## 2021-09-03 PROBLEM — H40.9 UNSPECIFIED GLAUCOMA: Chronic | Status: ACTIVE | Noted: 2021-08-23

## 2021-09-03 PROBLEM — Z87.19 PERSONAL HISTORY OF OTHER DISEASES OF THE DIGESTIVE SYSTEM: Chronic | Status: ACTIVE | Noted: 2021-08-23

## 2021-09-07 ENCOUNTER — APPOINTMENT (OUTPATIENT)
Dept: MRI IMAGING | Facility: HOSPITAL | Age: 61
End: 2021-09-07
Payer: MEDICARE

## 2021-09-07 ENCOUNTER — OUTPATIENT (OUTPATIENT)
Dept: OUTPATIENT SERVICES | Facility: HOSPITAL | Age: 61
LOS: 1 days | End: 2021-09-07
Payer: MEDICARE

## 2021-09-07 DIAGNOSIS — Z90.49 ACQUIRED ABSENCE OF OTHER SPECIFIED PARTS OF DIGESTIVE TRACT: Chronic | ICD-10-CM

## 2021-09-07 DIAGNOSIS — Z98.890 OTHER SPECIFIED POSTPROCEDURAL STATES: Chronic | ICD-10-CM

## 2021-09-07 DIAGNOSIS — H26.9 UNSPECIFIED CATARACT: Chronic | ICD-10-CM

## 2021-09-07 LAB — GLUCOSE BLDC GLUCOMTR-MCNC: 142 MG/DL — HIGH (ref 70–99)

## 2021-09-07 PROCEDURE — 70543 MRI ORBT/FAC/NCK W/O &W/DYE: CPT

## 2021-09-07 PROCEDURE — 70543 MRI ORBT/FAC/NCK W/O &W/DYE: CPT | Mod: 26

## 2021-09-07 PROCEDURE — A9585: CPT

## 2021-09-07 PROCEDURE — 82962 GLUCOSE BLOOD TEST: CPT

## 2021-09-07 PROCEDURE — 78815 PET IMAGE W/CT SKULL-THIGH: CPT

## 2021-09-07 PROCEDURE — A9552: CPT

## 2021-09-07 PROCEDURE — 78815 PET IMAGE W/CT SKULL-THIGH: CPT | Mod: 26,PS

## 2021-09-10 RX ORDER — AMLODIPINE BESYLATE 2.5 MG/1
1 TABLET ORAL
Qty: 0 | Refills: 0 | DISCHARGE

## 2021-09-10 RX ORDER — LATANOPROST 0.05 MG/ML
1 SOLUTION/ DROPS OPHTHALMIC; TOPICAL
Qty: 0 | Refills: 0 | DISCHARGE

## 2021-09-13 PROBLEM — C30.0 MALIGNANT NEOPLASM OF NASAL CAVITY: Chronic | Status: ACTIVE | Noted: 2021-09-10

## 2021-09-13 PROBLEM — C11.1: Chronic | Status: ACTIVE | Noted: 2021-08-23

## 2021-09-13 PROBLEM — E11.9 TYPE 2 DIABETES MELLITUS WITHOUT COMPLICATIONS: Chronic | Status: ACTIVE | Noted: 2021-09-10

## 2021-09-17 ENCOUNTER — APPOINTMENT (OUTPATIENT)
Dept: NEUROSURGERY | Facility: CLINIC | Age: 61
End: 2021-09-17
Payer: MEDICARE

## 2021-09-17 VITALS
TEMPERATURE: 97.7 F | SYSTOLIC BLOOD PRESSURE: 138 MMHG | WEIGHT: 245 LBS | OXYGEN SATURATION: 98 % | DIASTOLIC BLOOD PRESSURE: 89 MMHG | HEIGHT: 70 IN | RESPIRATION RATE: 14 BRPM | HEART RATE: 79 BPM | BODY MASS INDEX: 35.07 KG/M2

## 2021-09-17 DIAGNOSIS — C30.0 MALIGNANT NEOPLASM OF NASAL CAVITY: ICD-10-CM

## 2021-09-17 DIAGNOSIS — C31.9 MALIGNANT NEOPLASM OF NASAL CAVITY: ICD-10-CM

## 2021-09-17 PROCEDURE — 86769 SARS-COV-2 COVID-19 ANTIBODY: CPT

## 2021-09-17 PROCEDURE — 86850 RBC ANTIBODY SCREEN: CPT

## 2021-09-17 PROCEDURE — C1889: CPT

## 2021-09-17 PROCEDURE — 88331 PATH CONSLTJ SURG 1 BLK 1SPC: CPT

## 2021-09-17 PROCEDURE — 99204 OFFICE O/P NEW MOD 45 MIN: CPT

## 2021-09-17 PROCEDURE — 87186 SC STD MICRODIL/AGAR DIL: CPT

## 2021-09-17 PROCEDURE — 86900 BLOOD TYPING SEROLOGIC ABO: CPT

## 2021-09-17 PROCEDURE — 88305 TISSUE EXAM BY PATHOLOGIST: CPT

## 2021-09-17 PROCEDURE — 87184 SC STD DISK METHOD PER PLATE: CPT

## 2021-09-17 PROCEDURE — 36415 COLL VENOUS BLD VENIPUNCTURE: CPT

## 2021-09-17 PROCEDURE — 87070 CULTURE OTHR SPECIMN AEROBIC: CPT

## 2021-09-17 PROCEDURE — 87075 CULTR BACTERIA EXCEPT BLOOD: CPT

## 2021-09-17 PROCEDURE — C9399: CPT

## 2021-09-17 PROCEDURE — 86901 BLOOD TYPING SEROLOGIC RH(D): CPT

## 2021-09-17 PROCEDURE — 87181 SC STD AGAR DILUTION PER AGT: CPT

## 2021-09-17 NOTE — DATA REVIEWED
[de-identified] : DOC RNO   Report Date: 13-Sep-2021 09:26.00 \par Patient ID: [id number] ([id number]), [id number] ([id number])  Accession No.: [id number] \par Patient Birth Date: 1960  Report Status: F \par Referring Physician: [id number] RHIANNON TRAN   Reason For Study: OPAL NAVIGATION PROTOCOL. 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 Status post biopsy.\par \par TECHNIQUE: Coronal T1 and T2, axial T1 STIR images of the face, sagittal T1 and axial and coronal STIR images of the neck are obtained as well as axial T2-FLAIR imaging of the brain. Following the intravenous administration of 7.5 cc Gadavist, axial and coronal T1 fat-saturated imaging of the face was obtained, followed by axial T1 fat-saturated imaging of the whole neck and sagittal 3-D imaging of the whole brain with axial and coronal reformations provided.\par \par COMPARISON: MRI 07/08/2021, comparing back to original MR imaging of the brain-face done at outside institution from May 2018. Facial bone CT from 08/20/2021 is also reviewed, as is outside CT imaging of the face done in May 2018.\par \par FINDINGS:\par \par Upon comparison to the recent prior MR, there is interval debulking of a heterogeneous T2 hypointense an enhancing mass situated at the midline posterior and superior sphenoid sinus.\par \par As seen on the T2-weighted images, the posterior aspect of tumor on the left shows close approximation with the left carotid canal, seemingly interposed by more T2 hyperintense and possibly inflammatory soft tissue as opposed to direct tumoral invasion of the canal. On the recent CT, the left bony carotid canal erosion is best seen on sagittal series 9, image 38, spanning about 6 mm AP-oblique. Similar appearing site of thinning/defect is seen, in retrospect, on CT from May 2018. Flow void of the left ICA is normal, without encasement and narrowing by tumor.\par \par There remains no evidence of intraorbital or intracranial invasion of tumor. Previously seen mass lesion the left cavernous sinus is resolved. Only minimal and questionable asymmetric tissue seen at left foramen ovale on series 17, image 14, stable and likely site of treated/sterile tumoral disease. Currently, no masslike soft tissue seen at the cavernous sinuses or Meckel's caves nor the infratemporal fossae. Remaining paranasal sinuses show inflammatory mucosal thickening that shows greater signal intensity compared to tumor. This has increased within the sphenoid sinus, now demarcating the tumor nicely on series 9, image 24 surrounded by more inflammatory soft tissue at the periphery of the sinus, especially without visible between tumor and the left carotid canal on series 9, image 24 but more equivocal tumor abutting the left carotid artery on series 9, image 25, and furthermore difficult to discriminate on coronal series 13, image 17 at the posterolateral site of tumor appears On these images, residual enhancing tumor possessing low signal intensity on the STIR images, measures 1.8 cm in oblique AP dimension (series 9, image 24 and approximately 1.2 cm in height on series 13, image 18. The T2-FLAIR images of the brain show no interval change. No hydrocephalus, intracranial fluid collection or mass effect. Postcontrast imaging shows no abnormal intracranial enhancement.\par \par Imaging done through the whole neck is somewhat motion degraded, showing no large ariadna mass in the neck or gross infrahyoid mass lesion.\par \par IMPRESSION:\par \par Compared to 07/08/2018, there is interval debulking/biopsy of recurrent disease in the sphenoid sinus. As on the original scan, there is tumor proximity to the left carotid canal, though MRI suggests small volume inflammatory mucosal tissue interposed between the mass and the carotid canal. Degree of bony erosion is unchanged, in retrospect, since August 2018. No evidence of ICA encasement or narrowing by tumor. No evidence of recurrent tumor intracranially, specifically at cavernous sinus.\par \par No suspicious cervical lymph nodes on MRI allowing for motion limitation.\par \par \par Case reviewed during interdisciplinary ENT tumor board on the morning of this dictation.\par \par --- End of Report ---\par \par \par \par \par Thank you for the opportunity to participate in the care of this patient.\par \par \par EMILY CHOI MD; Attending Radiologist\par This document has been electronically signed. Sep 13 2021 9:26AM \par

## 2021-09-17 NOTE — HISTORY OF PRESENT ILLNESS
[de-identified] : This is a 60 year old male  who comes to be evaluated for treatment options which could include Infratemporal fossa craniotomy or Endoscopic resection  . \par Patient with a Known history of anterior skull/skull base adenocarcinoma s/p biopsy by Dr. Stewart 5/17/18 \par Reports he initially presented to a hospital in the [place] with headaches and dizziness\par \par After his biopsy he was treated  6000 cGy/ 30 fractions from 6/26/18 to 8/9/18 with Dr. Johnston. Seems Mr Sinha tolerated the treatment well\par TNM Stage: c T 4b N 0 M 0 \par AJCC Stage (8th Ed): IV. \par \par He was found to have slow interval enlargement of a lobular soft tissue in the region of the inter sphenoid septum  on his July 2021 MRI . He then underwent bilateral revision maxillary antrostomy, right ethmoidectomy, sphenoid or clivus biopsy of intranasal mass with  Dr Wei Stewart on 8/24/2021.\par \par He is scheduled to meet with Dr Markel Bright on 9/21/2021  to discuss  balloon occlusion as a part of his preparation for surgical intervention\par \par Denies any new onset complaints at today's visit

## 2021-09-17 NOTE — ASSESSMENT
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 I had a long discussion with the patient regarding the role of surgical resection with Dr. Stewart. He will require a BTO preoperatively and is seeing Dr. Bright 9/21 to discuss.  The patient was extensively educated about the nature of his disease process. Therapeutic and diagnostic tests include.  The patient should see. I have explained the alternatives, risks and benefits to the patient and she understands and agrees to proceed.  This risk of the procedure including but not limited to pain, infection, seizure, stroke, recurrence, residual disease, neurovascular injury, heart attack, pulmonary embolism, blindness, weakness, paralysis and death have been carefully explained to the patient who clearly understands and agrees to proceed.\par

## 2021-09-17 NOTE — REASON FOR VISIT
[New Patient Visit] : a new patient visit [Referred By: _________] : Patient was referred by NAJMA [[employer] ] : provided by [employer]   [Family Member] : family member [Time Spent: ____ minutes] : Total time spent using  services: [unfilled] minutes. The patient's primary language is not English thus required  services. [Interpreters_IDNumber] : [id number] [Interpreters_FullName] : Lilliam

## 2021-09-21 ENCOUNTER — APPOINTMENT (OUTPATIENT)
Dept: NEUROSURGERY | Facility: CLINIC | Age: 61
End: 2021-09-21
Payer: MEDICARE

## 2021-09-21 VITALS
DIASTOLIC BLOOD PRESSURE: 95 MMHG | BODY MASS INDEX: 35.07 KG/M2 | RESPIRATION RATE: 18 BRPM | HEART RATE: 81 BPM | SYSTOLIC BLOOD PRESSURE: 156 MMHG | TEMPERATURE: 98 F | HEIGHT: 70 IN | OXYGEN SATURATION: 98 % | WEIGHT: 245 LBS

## 2021-09-21 PROCEDURE — 99214 OFFICE O/P EST MOD 30 MIN: CPT

## 2021-09-21 RX ORDER — DORZOLAMIDE HYDROCHLORIDE TIMOLOL MALEATE 20; 5 MG/ML; MG/ML
22.3-6.8 SOLUTION/ DROPS OPHTHALMIC
Refills: 0 | Status: ACTIVE | COMMUNITY

## 2021-09-21 RX ORDER — LATANOPROST/PF 0.005 %
0.01 DROPS OPHTHALMIC (EYE)
Refills: 0 | Status: ACTIVE | COMMUNITY

## 2021-09-21 RX ORDER — AMLODIPINE BESYLATE 5 MG/1
5 TABLET ORAL
Refills: 0 | Status: ACTIVE | COMMUNITY

## 2021-09-21 NOTE — PHYSICAL EXAM
[General Appearance - Alert] : alert [General Appearance - In No Acute Distress] : in no acute distress [Oriented To Time, Place, And Person] : oriented to person, place, and time [Impaired Insight] : insight and judgment were intact [Motor Tone] : muscle tone was normal in all four extremities [Motor Strength] : muscle strength was normal in all four extremities [Abnormal Walk] : normal gait [PERRL With Normal Accommodation] : pupils were equal in size, round, reactive to light, with normal accommodation [Extraocular Movements] : extraocular movements were intact [Full Visual Field] : full visual field [Neck Appearance] : the appearance of the neck was normal [] : no respiratory distress [Respiration, Rhythm And Depth] : normal respiratory rhythm and effort [FreeTextEntry5] : PERRLA, + left eye and facial palsy/asymmetry. [FreeTextEntry6] : 5/5 on all four extremities. [FreeTextEntry1] : PERRLA, + left eye and facial pals

## 2021-09-21 NOTE — REASON FOR VISIT
[Consultation] : a consultation visit [Referred By: _________] : Patient was referred by NAJMA [Source: ______] : History obtained from [unfilled] [FreeTextEntry1] : for pre-op balloon test occlusion

## 2021-09-21 NOTE — ASSESSMENT
[FreeTextEntry1] : 60 years-old man with history of hypertension and skull base adenocarcinoma status post biopsy and radiosurgery in 2018.  A follow up MRI from July 2021 demonstrated tumor progression in the sphenoid sinus adjacent to the left internal carotid artery.  In preparation for further treatment, he will undergo a temporary balloon occlusion of the left internal carotid artery with brain SPECT.  The endovascular procedure, benefits and risks were explained.\par \par \par Plan:\par 1. Diagnostic cerebral angiogram, balloon test occlusion of the left carotid artery, nuclear brain SPECT on 10/7/21. Will need PCP clearance and COVID test 3 days before the procedure.

## 2021-09-21 NOTE — HISTORY OF PRESENT ILLNESS
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 Stewart 5/17/18 followed by 6000 cGy/ 30 fractions from 6/26/18 to 8/9/18 with Dr. Johnston.\par \par He was found to have progression of disease on his July 2021 MRI. MRI 7/8/21 at [facility] shows slow interval enlargement of a lobular soft tissue in the region of the inter sphenoid septum suspicious for recurrent tumor. He then underwent bilateral revision maxillary antrostomy, right ethmoidectomy, sphenoid or clivus biopsy of intranasal mass with Dr Wei Stewart on 8/24/2021. Sept 2021 MRI shows tumor proximity to the left carotid canal . \par \par Treatment options which could include Infratemporal fossa craniotomy or Endoscopic resection.\par \par He has no new neuro complaints.\par \par  \par Handedness:\par \par Patient Address:\par [address] [place] [address], [facility] [address]\[place] [address]\par \par Referring MD:\par Dr. Azael Callaway\par \par PCP:\par Dr. Jennifer Engel\par [address] [facility] [address]\[place] [address]\par [phone]

## 2021-09-21 NOTE — DATA REVIEWED
[de-identified] : [facility]\par    [facility] Department of Radiology\par   Radiology Report\par \par \par Patient Name: DOC RON   Report Date: 13-Sep-2021 09:26.00 \par Patient ID: [id number] ([id number]), [id number] (EPI)  Accession No.: [id number] \par Patient Birth Date: 1960  Report Status: F \par Referring Physician: [id number] RHIANNON TRAN   Reason For Study: OPAL NAVIGATION PROTOCOL. 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 Status post biopsy.\par \par TECHNIQUE: Coronal T1 and T2, axial T1 STIR images of the face, sagittal T1 and axial and coronal STIR images of the neck are obtained as well as axial T2-FLAIR imaging of the brain. Following the intravenous administration of 7.5 cc Gadavist, axial and coronal T1 fat-saturated imaging of the face was obtained, followed by axial T1 fat-saturated imaging of the whole neck and sagittal 3-D imaging of the whole brain with axial and coronal reformations provided.\par \par COMPARISON: MRI 07/08/2021, comparing back to original MR imaging of the brain-face done at outside institution from May 2018. Facial bone CT from 08/20/2021 is also reviewed, as is outside CT imaging of the face done in May 2018.\par \par FINDINGS:\par \par Upon comparison to the recent prior MR, there is interval debulking of a heterogeneous T2 hypointense an enhancing mass situated at the midline posterior and superior sphenoid sinus.\par \par As seen on the T2-weighted images, the posterior aspect of tumor on the left shows close approximation with the left carotid canal, seemingly interposed by more T2 hyperintense and possibly inflammatory soft tissue as opposed to direct tumoral invasion of the canal. On the recent CT, the left bony carotid canal erosion is best seen on sagittal series 9, image 38, spanning about 6 mm AP-oblique. Similar appearing site of thinning/defect is seen, in retrospect, on CT from May 2018. Flow void of the left ICA is normal, without encasement and narrowing by tumor.\par \par There remains no evidence of intraorbital or intracranial invasion of tumor. Previously seen mass lesion the left cavernous sinus is resolved. Only minimal and questionable asymmetric tissue seen at left foramen ovale on series 17, image 14, stable and likely site of treated/sterile tumoral disease. Currently, no masslike soft tissue seen at the cavernous sinuses or Meckel's caves nor the infratemporal fossae. Remaining paranasal sinuses show inflammatory mucosal thickening that shows greater signal intensity compared to tumor. This has increased within the sphenoid sinus, now demarcating the tumor nicely on series 9, image 24 surrounded by more inflammatory soft tissue at the periphery of the sinus, especially without visible between tumor and the left carotid canal on series 9, image 24 but more equivocal tumor abutting the left carotid artery on series 9, image 25, and furthermore difficult to discriminate on coronal series 13, image 17 at the posterolateral site of tumor appears On these images, residual enhancing tumor possessing low signal intensity on the STIR images, measures 1.8 cm in oblique AP dimension (series 9, image 24 and approximately 1.2 cm in height on series 13, image 18. The T2-FLAIR images of the brain show no interval change. No hydrocephalus, intracranial fluid collection or mass effect. Postcontrast imaging shows no abnormal intracranial enhancement.\par \par Imaging done through the whole neck is somewhat motion degraded, showing no large ariadna mass in the neck or gross infrahyoid mass lesion.\par \par IMPRESSION:\par \par Compared to 07/08/2018, there is interval debulking/biopsy of recurrent disease in the sphenoid sinus. As on the original scan, there is tumor proximity to the left carotid canal, though MRI suggests small volume inflammatory mucosal tissue interposed between the mass and the carotid canal. Degree of bony erosion is unchanged, in retrospect, since August 2018. No evidence of ICA encasement or narrowing by tumor. No evidence of recurrent tumor intracranially, specifically at cavernous sinus.\par \par No suspicious cervical lymph nodes on MRI allowing for motion limitation.\par \par \par Case reviewed during interdisciplinary ENT tumor board on the morning of this dictation.\par \par --- End of Report ---\par \par \par \par \par Thank you for the opportunity to participate in the care of this patient.\par \par \par EMILY CHOI MD; Attending Radiologist\par This document has been electronically signed. Sep 13 2021 9:26AM \par  [de-identified] : [facility]\par    [facility] Department of Radiology\par   Radiology Report\par \par \par Patient Name: DOC RON   Report Date: 08-Jul-2021 15:14.00 \par Patient ID: [id number] ([id number]), [id number] (EPI)  Accession No.: [id number] \par Patient Birth Date: 1960  Report Status: F \par Referring Physician: [id number] JULIANA KAY   Reason For Study: MALIGNANT NEOPL OF THE NASAL CAVITY AND SINUS  \par \par \par \par \par \par EXAM: MR ORBIT FACE AND ORNECK WAWIC\par \par PROCEDURE DATE: 07/08/2021\par \par \par \par INTERPRETATION: The soft tissues of the face were evaluated in the axial and coronal planes, with T1 and T2 weighted sequences acquired both before and following intravenous contrast administration. In addition, the whole brain was assessed on sagittal and axial imaging.\par \par Contrast dose: 7.5 cc of intravenous Gadavist.\par \par Clinical information: History of sinonasal adenoid cystic carcinoma. Surveillance imaging.\par \par The current study is compared with most recent previous examination dated 9/29/2020 as well as older study of 3/21/2020.\par \par There has been slow interval enlargement of a multilobular focus of enhancing soft tissue based at the inter sphenoid septum, from a maximum dimension in the axial plane of 2.0 cm on 3/2020 to 2.2 cm on 9/2020 to 2.4 cm on the current examination (please see bookmarked images). Findings are suspicious for tumor persistence/recurrence at this site.\par \par There has been interval improved ventilation of the right maxillary sinus, with persistent complete opacification of the left maxillary sinus and lesser mucosal thickening within residual bilateral ethmoidal air cells. There is been no appreciable change in the appearance of the skull base with persistent enhancement in the left greater than right pterygopalatine fossa and the left orbital fissures, but without pathologic orbital enhancement. There is persistent asymmetric enhancement in the left vidian canal and, minimally at left foramen ovale. There is persistent mild asymmetric fullness of the left cavernous sinus.\par \par The ventricles, sulci and cisterns remain normal in caliber for the patient's age without intracranial mass or pathologic contrast enhancement. There is again nonspecific T2 shortening in the cerebral white matter. Mastoid air cells remain clear.\par \par IMPRESSION:\par \par There has been slow interval enlargement of lobular soft tissue in the region of the inter sphenoid septum on comparison with prior studies dating back to 3/2020, felt suspicious for persistent/recurrent tumor. Other findings are stable.\par \par --- End of Report ---\par \par \par \par \par Thank you for the opportunity to participate in the care of this patient.\par \par \par DAVID RAO MD; Attending Radiologist\par This document has been electronically signed. Jul 8 2021 3:14PM \par

## 2021-09-21 NOTE — ADDENDUM
[FreeTextEntry1] : 2021\par \par Azael Callaway MD\par Director, Brain Tumor Center\par [facility]\par [address]\par [address] Floor\[place] [address]\par \par Patient’s Name:  Travis Sinha\par : 1960\par \par Dear Dr. Callaway:\par \par We met Travis in our office at [facility].  He was accompanied by his wife, Jennifer.  As you know, he is a 60 years-old man with history of hypertension and skull base adenocarcinoma status post biopsy and radiosurgery in 2018.  A follow up MRI from 2021 demonstrated tumor progression in the sphenoid sinus adjacent to the left internal carotid artery.  In preparation for further treatment, he will undergo a temporary balloon occlusion of the left internal carotid artery with brain SPECT.  The endovascular procedure, benefits and risks were explained.\par \par I will keep you updated at all times.  Thank you for allowing us to participate in Travis’s care.\par \par Sincerely,\par \par \par Markel Bright MD\par Chief\par Neuro-Endovascular Surgery and \par Interventional Neuroradiology \par [facility]\par \par [facility]\par [address]\par [address] Floor\[place] [address]\par T:  [phone]\par F:  [phone]\par \par cc:	Jennifer Engel MD\par 	[address]\[place] [address]\par \par Wei Stewart MD\par 	\par Mr. Travis Sinha\par [address]\[place] [address]\par \par \par

## 2021-09-21 NOTE — REVIEW OF SYSTEMS
[Facial Weakness] : facial weakness [As Noted in HPI] : as noted in HPI [Negative] : Respiratory [FreeTextEntry3] : left eye palsy

## 2021-09-30 ENCOUNTER — NON-APPOINTMENT (OUTPATIENT)
Age: 61
End: 2021-09-30

## 2021-10-01 ENCOUNTER — NON-APPOINTMENT (OUTPATIENT)
Age: 61
End: 2021-10-01

## 2021-10-02 ENCOUNTER — LABORATORY RESULT (OUTPATIENT)
Age: 61
End: 2021-10-02

## 2021-10-05 ENCOUNTER — APPOINTMENT (OUTPATIENT)
Dept: NEUROSURGERY | Facility: HOSPITAL | Age: 61
End: 2021-10-05

## 2021-10-05 ENCOUNTER — OUTPATIENT (OUTPATIENT)
Dept: OUTPATIENT SERVICES | Facility: HOSPITAL | Age: 61
LOS: 1 days | Discharge: HOME | End: 2021-10-05
Payer: MEDICARE

## 2021-10-05 ENCOUNTER — RESULT REVIEW (OUTPATIENT)
Age: 61
End: 2021-10-05

## 2021-10-05 DIAGNOSIS — Z98.890 OTHER SPECIFIED POSTPROCEDURAL STATES: Chronic | ICD-10-CM

## 2021-10-05 DIAGNOSIS — H26.9 UNSPECIFIED CATARACT: Chronic | ICD-10-CM

## 2021-10-05 DIAGNOSIS — Z90.49 ACQUIRED ABSENCE OF OTHER SPECIFIED PARTS OF DIGESTIVE TRACT: Chronic | ICD-10-CM

## 2021-10-05 LAB
GLUCOSE BLDC GLUCOMTR-MCNC: 105 MG/DL — HIGH (ref 70–99)
GLUCOSE BLDC GLUCOMTR-MCNC: 92 MG/DL — SIGNIFICANT CHANGE UP (ref 70–99)

## 2021-10-05 PROCEDURE — 36224 PLACE CATH CAROTD ART: CPT | Mod: 50

## 2021-10-05 PROCEDURE — 36226 PLACE CATH VERTEBRAL ART: CPT | Mod: 50

## 2021-10-05 PROCEDURE — C1769: CPT

## 2021-10-05 PROCEDURE — C1887: CPT

## 2021-10-05 PROCEDURE — C1760: CPT

## 2021-10-05 PROCEDURE — C1894: CPT

## 2021-10-05 PROCEDURE — 78830 RP LOCLZJ TUM SPECT W/CT 1: CPT | Mod: 26

## 2021-10-05 PROCEDURE — 82962 GLUCOSE BLOOD TEST: CPT

## 2021-10-05 RX ORDER — SODIUM CHLORIDE 9 MG/ML
500 INJECTION INTRAMUSCULAR; INTRAVENOUS; SUBCUTANEOUS
Refills: 0 | Status: DISCONTINUED | OUTPATIENT
Start: 2021-10-05 | End: 2021-10-19

## 2021-10-05 RX ORDER — SODIUM CHLORIDE 9 MG/ML
500 INJECTION INTRAMUSCULAR; INTRAVENOUS; SUBCUTANEOUS
Refills: 0 | Status: DISCONTINUED | OUTPATIENT
Start: 2021-10-05 | End: 2021-10-05

## 2021-10-05 RX ORDER — CHLORHEXIDINE GLUCONATE 213 G/1000ML
1 SOLUTION TOPICAL ONCE
Refills: 0 | Status: DISCONTINUED | OUTPATIENT
Start: 2021-10-05 | End: 2021-10-19

## 2021-10-05 NOTE — BRIEF OPERATIVE NOTE - OPERATION/FINDINGS
Under MAC, using 5 fr sheath bilateral CFA's, cerebral angiogram was performed.  Findings: No aneurysm, AVM, or early venous shunting.  Full report to follow.  Patient received heparin bolus and drip to therapeutic range, then a balloon was inflated in the left ICA for total of 10 min, patient remained hemodynamically, neurologically stable and there was angiographic evidence of collateral from right carotid artery and left PCA. The radioisotope neurolite was injected IV by anesthesiologist.  The balloon was deflated and post balloon injection without thromboembolic complications  Patient tolerated procedure well, no new neurological deficit, hemodynamically stable.  Bilateral groin/vasc access sites were closed with perclose and  manual compression applied, hemostasis achieved, no hematoma.  Patient was transferred to nuclear medicine for brain SPECT  Above d/w Dr. Bright

## 2021-10-05 NOTE — PROGRESS NOTE ADULT - SUBJECTIVE AND OBJECTIVE BOX
NS/Neuroendovascular Surgery Pre Proc Note    HPI: 60 y.o right handed man with h/o HTN, DM, glaucoma and skull base adenocarcinoma, s/p biopsy and RT 2018, with recent follow up imaging c/w disease progression  with tumor involvement in the sphenoid sinus adjacent to left internal carotid artery. Patient is scheduled for surgery 10/7/21, in preparation, patient is referred for cerebral angiography, temporary balloon occlusion and brain SPECT.  Patient has decreased visual acuity left mesial field, but is able to count fingers, he denies recent fever, cough, chills, headaches, myalgias arthralgias, cp, sob. Covid vaccinated and tested         PMHx: as above HTN, DM, Glaucoma and adenocarcinoma of skull base    PSHx: Endoscopic endonasal biopsy of anterior skull base mass 5/17/18, and 8/25/21            RT 2018            Cholecystectomy            Right cataract surgery    Soc Hx: Denies smoking, denies etoh, denies illicit subst abuse    Fm Hx: Non contributory    Meds: Amlodipine 5 mg po daily, Losartan potassium 100 mg po daily, latanoprost eye drops    Labs reviewed      Exam: General: Comfortable in no acute distress  Neck supple, No JVD, FROM, Nontender  Alert , oriented to time, place and person  Pupils 2.5 mm b/l RTL, EOM intact, left eye ptosis  CN II - XII decreased left mesial field visual acuity, otherwise intact  Motor: 5/5 in all four limbs, No pronator drift  Sensory: B/l Symmetrical and intact to fine touch  Cerebellar Signs: No dysmetria, Finger nose test negative  Distal Pulsations: 2+ bilateral DP  Skin: Intact, No bruises/ petechiae/ Rashes/Lesions noted    Proc, R/B/A were discussed, all questions were answered and consent was signed.

## 2021-10-05 NOTE — PROGRESS NOTE ADULT - THIS PATIENT HAS THE FOLLOWING CONDITION(S)/DIAGNOSES ON THIS ADMISSION:
.  .                                                      At [facility], one important tool we use to improve our patient services is our Patient Survey.  Following your visit you may receive our survey in the mail or by email.    Please take the time to complete the survey.    If your visit with us was great, we want to hear about it.    If we can improve, please let us know how.           
None

## 2021-10-05 NOTE — BRIEF OPERATIVE NOTE - NSICDXBRIEFPROCEDURE_GEN_ALL_CORE_FT
PROCEDURES:  Arteriography of cerebral arteries with temporary balloon occlusion 05-Oct-2021 14:03:40 Neuro lite injection for brain SPECT imaging [place]

## 2021-10-06 ENCOUNTER — TRANSCRIPTION ENCOUNTER (OUTPATIENT)
Age: 61
End: 2021-10-06

## 2021-10-06 ENCOUNTER — NON-APPOINTMENT (OUTPATIENT)
Age: 61
End: 2021-10-06

## 2021-10-07 ENCOUNTER — INPATIENT (INPATIENT)
Facility: HOSPITAL | Age: 61
LOS: 4 days | Discharge: HOME HEALTH | DRG: 144 | End: 2021-10-12
Attending: SPECIALIST | Admitting: SPECIALIST
Payer: MEDICARE

## 2021-10-07 ENCOUNTER — RESULT REVIEW (OUTPATIENT)
Age: 61
End: 2021-10-07

## 2021-10-07 VITALS
HEIGHT: 70 IN | HEART RATE: 89 BPM | OXYGEN SATURATION: 97 % | RESPIRATION RATE: 16 BRPM | WEIGHT: 245.59 LBS | SYSTOLIC BLOOD PRESSURE: 134 MMHG | DIASTOLIC BLOOD PRESSURE: 82 MMHG | TEMPERATURE: 98 F

## 2021-10-07 DIAGNOSIS — Z90.49 ACQUIRED ABSENCE OF OTHER SPECIFIED PARTS OF DIGESTIVE TRACT: Chronic | ICD-10-CM

## 2021-10-07 DIAGNOSIS — C80.1 MALIGNANT (PRIMARY) NEOPLASM, UNSPECIFIED: ICD-10-CM

## 2021-10-07 DIAGNOSIS — Z98.890 OTHER SPECIFIED POSTPROCEDURAL STATES: Chronic | ICD-10-CM

## 2021-10-07 DIAGNOSIS — H26.9 UNSPECIFIED CATARACT: Chronic | ICD-10-CM

## 2021-10-07 LAB
BLD GP AB SCN SERPL QL: NEGATIVE — SIGNIFICANT CHANGE UP
GLUCOSE BLDC GLUCOMTR-MCNC: 116 MG/DL — HIGH (ref 70–99)
GRAM STN FLD: SIGNIFICANT CHANGE UP
GRAM STN FLD: SIGNIFICANT CHANGE UP
RH IG SCN BLD-IMP: POSITIVE — SIGNIFICANT CHANGE UP
SPECIMEN SOURCE: SIGNIFICANT CHANGE UP
SPECIMEN SOURCE: SIGNIFICANT CHANGE UP

## 2021-10-07 PROCEDURE — 88331 PATH CONSLTJ SURG 1 BLK 1SPC: CPT | Mod: 26

## 2021-10-07 PROCEDURE — 99222 1ST HOSP IP/OBS MODERATE 55: CPT

## 2021-10-07 PROCEDURE — 88304 TISSUE EXAM BY PATHOLOGIST: CPT | Mod: 26

## 2021-10-07 PROCEDURE — 88300 SURGICAL PATH GROSS: CPT | Mod: 26,59

## 2021-10-07 PROCEDURE — 88311 DECALCIFY TISSUE: CPT | Mod: 26

## 2021-10-07 PROCEDURE — 88305 TISSUE EXAM BY PATHOLOGIST: CPT | Mod: 26

## 2021-10-07 RX ORDER — ACETAMINOPHEN 500 MG
650 TABLET ORAL EVERY 6 HOURS
Refills: 0 | Status: DISCONTINUED | OUTPATIENT
Start: 2021-10-07 | End: 2021-10-12

## 2021-10-07 RX ORDER — VANCOMYCIN HCL 1 G
1500 VIAL (EA) INTRAVENOUS EVERY 12 HOURS
Refills: 0 | Status: DISCONTINUED | OUTPATIENT
Start: 2021-10-07 | End: 2021-10-08

## 2021-10-07 RX ORDER — PIPERACILLIN AND TAZOBACTAM 4; .5 G/20ML; G/20ML
4.5 INJECTION, POWDER, LYOPHILIZED, FOR SOLUTION INTRAVENOUS EVERY 6 HOURS
Refills: 0 | Status: DISCONTINUED | OUTPATIENT
Start: 2021-10-07 | End: 2021-10-08

## 2021-10-07 RX ORDER — VANCOMYCIN HCL 1 G
1500 VIAL (EA) INTRAVENOUS ONCE
Refills: 0 | Status: COMPLETED | OUTPATIENT
Start: 2021-10-07 | End: 2021-10-07

## 2021-10-07 RX ORDER — ONDANSETRON 8 MG/1
4 TABLET, FILM COATED ORAL EVERY 6 HOURS
Refills: 0 | Status: DISCONTINUED | OUTPATIENT
Start: 2021-10-07 | End: 2021-10-12

## 2021-10-07 RX ORDER — OXYCODONE AND ACETAMINOPHEN 5; 325 MG/1; MG/1
1 TABLET ORAL EVERY 4 HOURS
Refills: 0 | Status: DISCONTINUED | OUTPATIENT
Start: 2021-10-07 | End: 2021-10-12

## 2021-10-07 RX ORDER — SODIUM CHLORIDE 9 MG/ML
1000 INJECTION, SOLUTION INTRAVENOUS
Refills: 0 | Status: DISCONTINUED | OUTPATIENT
Start: 2021-10-07 | End: 2021-10-08

## 2021-10-07 RX ORDER — LOSARTAN POTASSIUM 100 MG/1
100 TABLET, FILM COATED ORAL DAILY
Refills: 0 | Status: DISCONTINUED | OUTPATIENT
Start: 2021-10-07 | End: 2021-10-07

## 2021-10-07 RX ORDER — FAMOTIDINE 10 MG/ML
20 INJECTION INTRAVENOUS EVERY 12 HOURS
Refills: 0 | Status: DISCONTINUED | OUTPATIENT
Start: 2021-10-07 | End: 2021-10-12

## 2021-10-07 RX ORDER — PIPERACILLIN AND TAZOBACTAM 4; .5 G/20ML; G/20ML
4.5 INJECTION, POWDER, LYOPHILIZED, FOR SOLUTION INTRAVENOUS ONCE
Refills: 0 | Status: COMPLETED | OUTPATIENT
Start: 2021-10-07 | End: 2021-10-07

## 2021-10-07 RX ORDER — PANTOPRAZOLE SODIUM 20 MG/1
40 TABLET, DELAYED RELEASE ORAL
Refills: 0 | Status: DISCONTINUED | OUTPATIENT
Start: 2021-10-07 | End: 2021-10-07

## 2021-10-07 RX ORDER — AMLODIPINE BESYLATE 2.5 MG/1
5 TABLET ORAL DAILY
Refills: 0 | Status: DISCONTINUED | OUTPATIENT
Start: 2021-10-07 | End: 2021-10-07

## 2021-10-07 RX ORDER — CHLORHEXIDINE GLUCONATE 213 G/1000ML
1 SOLUTION TOPICAL ONCE
Refills: 0 | Status: COMPLETED | OUTPATIENT
Start: 2021-10-07 | End: 2021-10-07

## 2021-10-07 RX ORDER — DORZOLAMIDE HYDROCHLORIDE TIMOLOL MALEATE 20; 5 MG/ML; MG/ML
1 SOLUTION/ DROPS OPHTHALMIC
Refills: 0 | Status: DISCONTINUED | OUTPATIENT
Start: 2021-10-07 | End: 2021-10-12

## 2021-10-07 RX ORDER — LATANOPROST 0.05 MG/ML
1 SOLUTION/ DROPS OPHTHALMIC; TOPICAL AT BEDTIME
Refills: 0 | Status: DISCONTINUED | OUTPATIENT
Start: 2021-10-07 | End: 2021-10-12

## 2021-10-07 RX ORDER — DIPHENHYDRAMINE HCL 50 MG
25 CAPSULE ORAL EVERY 4 HOURS
Refills: 0 | Status: DISCONTINUED | OUTPATIENT
Start: 2021-10-07 | End: 2021-10-12

## 2021-10-07 RX ADMIN — Medication 300 MILLIGRAM(S): at 23:05

## 2021-10-07 RX ADMIN — Medication 650 MILLIGRAM(S): at 12:30

## 2021-10-07 RX ADMIN — Medication 650 MILLIGRAM(S): at 23:04

## 2021-10-07 RX ADMIN — PIPERACILLIN AND TAZOBACTAM 200 GRAM(S): 4; .5 INJECTION, POWDER, LYOPHILIZED, FOR SOLUTION INTRAVENOUS at 18:44

## 2021-10-07 RX ADMIN — CHLORHEXIDINE GLUCONATE 1 APPLICATION(S): 213 SOLUTION TOPICAL at 07:02

## 2021-10-07 RX ADMIN — Medication 650 MILLIGRAM(S): at 13:27

## 2021-10-07 NOTE — BRIEF OPERATIVE NOTE - OPERATION/FINDINGS
Active infection of nasal cavity and nasopharynx with necrosis of nasal septum; debridement of nasal septum and bilateral MT/IT's. Case aborted thereafter. Frozens negative for carcinoma.

## 2021-10-07 NOTE — H&P ADULT - NSICDXPASTSURGICALHX_GEN_ALL_CORE_FT
PAST SURGICAL HISTORY:  History of cholecystectomy     History of nasal surgery biopsy    Right cataract

## 2021-10-07 NOTE — H&P ADULT - NSICDXPASTMEDICALHX_GEN_ALL_CORE_FT
PAST MEDICAL HISTORY:  Cancer of adenoid chemo & RT    DM (diabetes mellitus) type 2, not on meds    Glaucoma     H/O gastroesophageal reflux (GERD)     HTN (hypertension)     Malignant neoplasm of nasal cavity

## 2021-10-07 NOTE — H&P ADULT - ASSESSMENT
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

## 2021-10-07 NOTE — H&P ADULT - NSICDXFAMILYHX_GEN_ALL_CORE_FT
FAMILY HISTORY:  Mother  Still living? No  Type 2 diabetes mellitus, Age at diagnosis: Age Unknown    Sibling  Still living? Yes, Estimated age: Age Unknown  Hypertension, Age at diagnosis: Age Unknown

## 2021-10-07 NOTE — H&P ADULT - CLICK TO LAUNCH ORM
. Bexarotene Pregnancy And Lactation Text: This medication is Pregnancy Category X and should not be given to women who are pregnant or may become pregnant. This medication should not be used if you are breast feeding.

## 2021-10-07 NOTE — CONSULT NOTE ADULT - ASSESSMENT
62 yo M with recurrent adenoid cystic CA found to have active infection of nasal cavity and nasopharynx at time of attempted resection.  Prior cultures from 8/2021 with MSSA, Strep pyogenes, Strep mitis/oralis and Pseudomonas.  He was treated with clinda - the MSSA, Strep pyogenes and Strep mitis/oralis were susceptible.  Clinda has no activity against Pseudomonas.    Suggest:  - Please send blood cultures X 2 sets  - Will f/u OR cultures  - Start vancomycin 1.5 g IV q12h.  Trough prior to 4th dose.  - Start pip-tazo 4.5 g IV q6h  Recommendations discussed with primary team.  Will follow with you - team 1.

## 2021-10-07 NOTE — H&P ADULT - HISTORY OF PRESENT ILLNESS
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 Magno Johnston. He underwent balloon occlusion 8/5/2021 by Dr. Bright and passed with good perfusion. He is here for surgical removal of recurrent tumor. Pt's c/o headaches and dizziness. PEt/CT shows regional mets in nodes Level 1 and II with erosion of the carotid canal seen on MRI.

## 2021-10-08 LAB
ALBUMIN SERPL ELPH-MCNC: 3.4 G/DL — SIGNIFICANT CHANGE UP (ref 3.3–5)
ALP SERPL-CCNC: 90 U/L — SIGNIFICANT CHANGE UP (ref 40–120)
ALT FLD-CCNC: 14 U/L — SIGNIFICANT CHANGE UP (ref 10–45)
ANION GAP SERPL CALC-SCNC: 12 MMOL/L — SIGNIFICANT CHANGE UP (ref 5–17)
AST SERPL-CCNC: 29 U/L — SIGNIFICANT CHANGE UP (ref 10–40)
BASOPHILS # BLD AUTO: 0.02 K/UL — SIGNIFICANT CHANGE UP (ref 0–0.2)
BASOPHILS NFR BLD AUTO: 0.1 % — SIGNIFICANT CHANGE UP (ref 0–2)
BILIRUB DIRECT SERPL-MCNC: 0.2 MG/DL — SIGNIFICANT CHANGE UP (ref 0–0.2)
BILIRUB INDIRECT FLD-MCNC: 0.1 MG/DL — LOW (ref 0.2–1)
BILIRUB SERPL-MCNC: 0.3 MG/DL — SIGNIFICANT CHANGE UP (ref 0.2–1.2)
BUN SERPL-MCNC: 17 MG/DL — SIGNIFICANT CHANGE UP (ref 7–23)
CALCIUM SERPL-MCNC: 9.3 MG/DL — SIGNIFICANT CHANGE UP (ref 8.4–10.5)
CHLORIDE SERPL-SCNC: 102 MMOL/L — SIGNIFICANT CHANGE UP (ref 96–108)
CO2 SERPL-SCNC: 22 MMOL/L — SIGNIFICANT CHANGE UP (ref 22–31)
COVID-19 SPIKE DOMAIN AB INTERP: POSITIVE
COVID-19 SPIKE DOMAIN ANTIBODY RESULT: >250 U/ML — HIGH
CREAT SERPL-MCNC: 1.36 MG/DL — HIGH (ref 0.5–1.3)
EOSINOPHIL # BLD AUTO: 0 K/UL — SIGNIFICANT CHANGE UP (ref 0–0.5)
EOSINOPHIL NFR BLD AUTO: 0 % — SIGNIFICANT CHANGE UP (ref 0–6)
GLUCOSE SERPL-MCNC: 140 MG/DL — HIGH (ref 70–99)
HCT VFR BLD CALC: 40.1 % — SIGNIFICANT CHANGE UP (ref 39–50)
HCV AB S/CO SERPL IA: 0.06 S/CO — SIGNIFICANT CHANGE UP
HCV AB SERPL-IMP: SIGNIFICANT CHANGE UP
HGB BLD-MCNC: 13.1 G/DL — SIGNIFICANT CHANGE UP (ref 13–17)
IMM GRANULOCYTES NFR BLD AUTO: 0.4 % — SIGNIFICANT CHANGE UP (ref 0–1.5)
LYMPHOCYTES # BLD AUTO: 17 % — SIGNIFICANT CHANGE UP (ref 13–44)
LYMPHOCYTES # BLD AUTO: 3.1 K/UL — SIGNIFICANT CHANGE UP (ref 1–3.3)
MAGNESIUM SERPL-MCNC: 1.9 MG/DL — SIGNIFICANT CHANGE UP (ref 1.6–2.6)
MCHC RBC-ENTMCNC: 31.3 PG — SIGNIFICANT CHANGE UP (ref 27–34)
MCHC RBC-ENTMCNC: 32.7 GM/DL — SIGNIFICANT CHANGE UP (ref 32–36)
MCV RBC AUTO: 95.9 FL — SIGNIFICANT CHANGE UP (ref 80–100)
MONOCYTES # BLD AUTO: 0.55 K/UL — SIGNIFICANT CHANGE UP (ref 0–0.9)
MONOCYTES NFR BLD AUTO: 3 % — SIGNIFICANT CHANGE UP (ref 2–14)
NEUTROPHILS # BLD AUTO: 14.47 K/UL — HIGH (ref 1.8–7.4)
NEUTROPHILS NFR BLD AUTO: 79.5 % — HIGH (ref 43–77)
NRBC # BLD: 0 /100 WBCS — SIGNIFICANT CHANGE UP (ref 0–0)
PHOSPHATE SERPL-MCNC: 2.8 MG/DL — SIGNIFICANT CHANGE UP (ref 2.5–4.5)
PLATELET # BLD AUTO: 243 K/UL — SIGNIFICANT CHANGE UP (ref 150–400)
POTASSIUM SERPL-MCNC: 4.4 MMOL/L — SIGNIFICANT CHANGE UP (ref 3.5–5.3)
POTASSIUM SERPL-SCNC: 4.4 MMOL/L — SIGNIFICANT CHANGE UP (ref 3.5–5.3)
PROT SERPL-MCNC: 7.1 G/DL — SIGNIFICANT CHANGE UP (ref 6–8.3)
RBC # BLD: 4.18 M/UL — LOW (ref 4.2–5.8)
RBC # FLD: 13.2 % — SIGNIFICANT CHANGE UP (ref 10.3–14.5)
SARS-COV-2 IGG+IGM SERPL QL IA: >250 U/ML — HIGH
SARS-COV-2 IGG+IGM SERPL QL IA: POSITIVE
SODIUM SERPL-SCNC: 136 MMOL/L — SIGNIFICANT CHANGE UP (ref 135–145)
WBC # BLD: 18.27 K/UL — HIGH (ref 3.8–10.5)
WBC # FLD AUTO: 18.27 K/UL — HIGH (ref 3.8–10.5)

## 2021-10-08 PROCEDURE — 99232 SBSQ HOSP IP/OBS MODERATE 35: CPT | Mod: GC

## 2021-10-08 RX ORDER — SODIUM CHLORIDE 0.65 %
2 AEROSOL, SPRAY (ML) NASAL THREE TIMES A DAY
Refills: 0 | Status: DISCONTINUED | OUTPATIENT
Start: 2021-10-08 | End: 2021-10-12

## 2021-10-08 RX ORDER — PIPERACILLIN AND TAZOBACTAM 4; .5 G/20ML; G/20ML
4.5 INJECTION, POWDER, LYOPHILIZED, FOR SOLUTION INTRAVENOUS EVERY 6 HOURS
Refills: 0 | Status: DISCONTINUED | OUTPATIENT
Start: 2021-10-08 | End: 2021-10-12

## 2021-10-08 RX ORDER — PIPERACILLIN AND TAZOBACTAM 4; .5 G/20ML; G/20ML
4.5 INJECTION, POWDER, LYOPHILIZED, FOR SOLUTION INTRAVENOUS ONCE
Refills: 0 | Status: COMPLETED | OUTPATIENT
Start: 2021-10-08 | End: 2021-10-08

## 2021-10-08 RX ORDER — VANCOMYCIN HCL 1 G
VIAL (EA) INTRAVENOUS
Refills: 0 | Status: DISCONTINUED | OUTPATIENT
Start: 2021-10-08 | End: 2021-10-09

## 2021-10-08 RX ORDER — PIPERACILLIN AND TAZOBACTAM 4; .5 G/20ML; G/20ML
INJECTION, POWDER, LYOPHILIZED, FOR SOLUTION INTRAVENOUS
Refills: 0 | Status: DISCONTINUED | OUTPATIENT
Start: 2021-10-08 | End: 2021-10-12

## 2021-10-08 RX ORDER — MAGNESIUM SULFATE 500 MG/ML
2 VIAL (ML) INJECTION ONCE
Refills: 0 | Status: COMPLETED | OUTPATIENT
Start: 2021-10-08 | End: 2021-10-08

## 2021-10-08 RX ORDER — VANCOMYCIN HCL 1 G
1500 VIAL (EA) INTRAVENOUS ONCE
Refills: 0 | Status: COMPLETED | OUTPATIENT
Start: 2021-10-08 | End: 2021-10-08

## 2021-10-08 RX ORDER — SODIUM,POTASSIUM PHOSPHATES 278-250MG
1 POWDER IN PACKET (EA) ORAL EVERY 6 HOURS
Refills: 0 | Status: COMPLETED | OUTPATIENT
Start: 2021-10-08 | End: 2021-10-08

## 2021-10-08 RX ORDER — VANCOMYCIN HCL 1 G
1500 VIAL (EA) INTRAVENOUS EVERY 12 HOURS
Refills: 0 | Status: DISCONTINUED | OUTPATIENT
Start: 2021-10-08 | End: 2021-10-09

## 2021-10-08 RX ADMIN — Medication 300 MILLIGRAM(S): at 23:26

## 2021-10-08 RX ADMIN — PIPERACILLIN AND TAZOBACTAM 200 GRAM(S): 4; .5 INJECTION, POWDER, LYOPHILIZED, FOR SOLUTION INTRAVENOUS at 09:59

## 2021-10-08 RX ADMIN — Medication 650 MILLIGRAM(S): at 17:01

## 2021-10-08 RX ADMIN — DORZOLAMIDE HYDROCHLORIDE TIMOLOL MALEATE 1 DROP(S): 20; 5 SOLUTION/ DROPS OPHTHALMIC at 17:04

## 2021-10-08 RX ADMIN — DORZOLAMIDE HYDROCHLORIDE TIMOLOL MALEATE 1 DROP(S): 20; 5 SOLUTION/ DROPS OPHTHALMIC at 05:01

## 2021-10-08 RX ADMIN — Medication 650 MILLIGRAM(S): at 06:07

## 2021-10-08 RX ADMIN — Medication 650 MILLIGRAM(S): at 00:04

## 2021-10-08 RX ADMIN — Medication 50 GRAM(S): at 14:22

## 2021-10-08 RX ADMIN — Medication 2 SPRAY(S): at 21:05

## 2021-10-08 RX ADMIN — PIPERACILLIN AND TAZOBACTAM 200 GRAM(S): 4; .5 INJECTION, POWDER, LYOPHILIZED, FOR SOLUTION INTRAVENOUS at 23:29

## 2021-10-08 RX ADMIN — PIPERACILLIN AND TAZOBACTAM 200 GRAM(S): 4; .5 INJECTION, POWDER, LYOPHILIZED, FOR SOLUTION INTRAVENOUS at 17:01

## 2021-10-08 RX ADMIN — Medication 650 MILLIGRAM(S): at 23:29

## 2021-10-08 RX ADMIN — Medication 650 MILLIGRAM(S): at 18:01

## 2021-10-08 RX ADMIN — Medication 650 MILLIGRAM(S): at 05:07

## 2021-10-08 RX ADMIN — Medication 1 PACKET(S): at 14:22

## 2021-10-08 RX ADMIN — Medication 300 MILLIGRAM(S): at 11:24

## 2021-10-08 RX ADMIN — Medication 2 SPRAY(S): at 13:39

## 2021-10-08 RX ADMIN — LATANOPROST 1 DROP(S): 0.05 SOLUTION/ DROPS OPHTHALMIC; TOPICAL at 21:05

## 2021-10-08 RX ADMIN — Medication 1 PACKET(S): at 19:47

## 2021-10-08 NOTE — PROGRESS NOTE ADULT - ASSESSMENT
61M with recurrent adenoid cystic CA found to have active infection of nasal cavity and nasopharynx at time of attempted resection.  Prior cultures from 8/2021 with MSSA, Strep pyogenes, Strep mitis/oralis and Pseudomonas.  He was treated with clinda - the MSSA, Strep pyogenes and Strep mitis/oralis were susceptible.  Clinda has no activity against Pseudomonas.      Recs:   - f/u blood cultures X 2 sets sent on 10/8  - 10/7 OR cultures growing few Pseudomonas and few Staph aureus presumptive Methicillin resistant, susceptibility to f/u  - c/w vancomycin 1.5 g IV q12h.  Trough prior to 4th dose on 10/9 at 10 am (day 1=10/7)  - c/w pip-tazo 4.5 g IV q6h (day 1=10/7)    Recommendations discussed with primary team.  Will follow with you - team 1.  See attending attestation for final recs    61M with recurrent adenoid cystic CA found to have active infection of nasal cavity and nasopharynx at time of attempted resection.  Prior cultures from 8/2021 with MSSA, Strep pyogenes, Strep mitis/oralis and Pseudomonas.  He was treated with clinda - the MSSA, Strep pyogenes and Strep mitis/oralis were susceptible.  Clinda has no activity against Pseudomonas.      Recs:   - f/u blood cultures X 2 sets sent on 10/8  - 10/7 OR cultures growing few Pseudomonas; few Staph aureus presumptive Methicillin resistant, Lelliottia Amnigena (formerly classified as Enterobacter) may need to change to Ertapenem=>f/up susceptibility  - c/w vancomycin 1.5 g IV q12h.  Trough prior to 4th dose on 10/9 at 10 am (day 1=10/7)  - c/w pip-tazo 4.5 g IV q6h (day 1=10/7)    Recommendations discussed with primary team.  Will follow with you - team 1.  See attending attestation for final recs    61M with recurrent adenoid cystic CA found to have active infection of nasal cavity and nasopharynx at time of attempted resection.  Prior cultures from 8/2021 with MSSA, Strep pyogenes, Strep mitis/oralis and Pseudomonas.  He was treated with clinda - the MSSA, Strep pyogenes and Strep mitis/oralis were susceptible.  Clinda has no activity against Pseudomonas.      Recs:   - f/u blood cultures X 2 sets sent on 10/8  - 10/7 OR cultures growing few Pseudomonas; few Staph aureus presumptive Methicillin resistant, Lelliottia Amnigena (formerly classified as Enterobacter) may need to change to meropenem=>f/up susceptibility  - c/w vancomycin 1.5 g IV q12h.  Trough prior to 4th dose on 10/9 at 10 am (day 1=10/7)  - c/w pip-tazo 4.5 g IV q6h (day 1=10/7)    Recommendations discussed with primary team.  Will follow with you - team 1.  See attending attestation for final recs

## 2021-10-09 LAB
-  AMPICILLIN/SULBACTAM: SIGNIFICANT CHANGE UP
-  AMPICILLIN: SIGNIFICANT CHANGE UP
-  AZTREONAM: SIGNIFICANT CHANGE UP
-  CEFAZOLIN: SIGNIFICANT CHANGE UP
-  CEFEPIME: SIGNIFICANT CHANGE UP
-  CEFEPIME: SIGNIFICANT CHANGE UP
-  CEFTRIAXONE: SIGNIFICANT CHANGE UP
-  CIPROFLOXACIN: SIGNIFICANT CHANGE UP
-  CIPROFLOXACIN: SIGNIFICANT CHANGE UP
-  CLINDAMYCIN: SIGNIFICANT CHANGE UP
-  CLINDAMYCIN: SIGNIFICANT CHANGE UP
-  DAPTOMYCIN: SIGNIFICANT CHANGE UP
-  DAPTOMYCIN: SIGNIFICANT CHANGE UP
-  ERTAPENEM: SIGNIFICANT CHANGE UP
-  ERYTHROMYCIN: SIGNIFICANT CHANGE UP
-  ERYTHROMYCIN: SIGNIFICANT CHANGE UP
-  GENTAMICIN: SIGNIFICANT CHANGE UP
-  GENTAMICIN: SIGNIFICANT CHANGE UP
-  LINEZOLID: SIGNIFICANT CHANGE UP
-  LINEZOLID: SIGNIFICANT CHANGE UP
-  OXACILLIN: SIGNIFICANT CHANGE UP
-  OXACILLIN: SIGNIFICANT CHANGE UP
-  PIPERACILLIN/TAZOBACTAM: SIGNIFICANT CHANGE UP
-  PIPERACILLIN/TAZOBACTAM: SIGNIFICANT CHANGE UP
-  RIFAMPIN: SIGNIFICANT CHANGE UP
-  RIFAMPIN: SIGNIFICANT CHANGE UP
-  TETRACYCLINE: SIGNIFICANT CHANGE UP
-  TETRACYCLINE: SIGNIFICANT CHANGE UP
-  TOBRAMYCIN: SIGNIFICANT CHANGE UP
-  TOBRAMYCIN: SIGNIFICANT CHANGE UP
-  TRIMETHOPRIM/SULFAMETHOXAZOLE: SIGNIFICANT CHANGE UP
-  VANCOMYCIN: SIGNIFICANT CHANGE UP
-  VANCOMYCIN: SIGNIFICANT CHANGE UP
ALBUMIN SERPL ELPH-MCNC: 3.8 G/DL — SIGNIFICANT CHANGE UP (ref 3.3–5)
ALP SERPL-CCNC: 83 U/L — SIGNIFICANT CHANGE UP (ref 40–120)
ALT FLD-CCNC: 15 U/L — SIGNIFICANT CHANGE UP (ref 10–45)
ANION GAP SERPL CALC-SCNC: 10 MMOL/L — SIGNIFICANT CHANGE UP (ref 5–17)
AST SERPL-CCNC: 17 U/L — SIGNIFICANT CHANGE UP (ref 10–40)
BASOPHILS # BLD AUTO: 0.04 K/UL — SIGNIFICANT CHANGE UP (ref 0–0.2)
BASOPHILS NFR BLD AUTO: 0.3 % — SIGNIFICANT CHANGE UP (ref 0–2)
BILIRUB SERPL-MCNC: 0.3 MG/DL — SIGNIFICANT CHANGE UP (ref 0.2–1.2)
BUN SERPL-MCNC: 20 MG/DL — SIGNIFICANT CHANGE UP (ref 7–23)
CALCIUM SERPL-MCNC: 9.6 MG/DL — SIGNIFICANT CHANGE UP (ref 8.4–10.5)
CHLORIDE SERPL-SCNC: 103 MMOL/L — SIGNIFICANT CHANGE UP (ref 96–108)
CO2 SERPL-SCNC: 29 MMOL/L — SIGNIFICANT CHANGE UP (ref 22–31)
CREAT SERPL-MCNC: 1.49 MG/DL — HIGH (ref 0.5–1.3)
CULTURE RESULTS: SIGNIFICANT CHANGE UP
CULTURE RESULTS: SIGNIFICANT CHANGE UP
EOSINOPHIL # BLD AUTO: 0.09 K/UL — SIGNIFICANT CHANGE UP (ref 0–0.5)
EOSINOPHIL NFR BLD AUTO: 0.8 % — SIGNIFICANT CHANGE UP (ref 0–6)
GLUCOSE SERPL-MCNC: 153 MG/DL — HIGH (ref 70–99)
HCT VFR BLD CALC: 40.5 % — SIGNIFICANT CHANGE UP (ref 39–50)
HGB BLD-MCNC: 12.9 G/DL — LOW (ref 13–17)
IMM GRANULOCYTES NFR BLD AUTO: 0.5 % — SIGNIFICANT CHANGE UP (ref 0–1.5)
LYMPHOCYTES # BLD AUTO: 37.5 % — SIGNIFICANT CHANGE UP (ref 13–44)
LYMPHOCYTES # BLD AUTO: 4.35 K/UL — HIGH (ref 1–3.3)
MAGNESIUM SERPL-MCNC: 2.1 MG/DL — SIGNIFICANT CHANGE UP (ref 1.6–2.6)
MCHC RBC-ENTMCNC: 30.6 PG — SIGNIFICANT CHANGE UP (ref 27–34)
MCHC RBC-ENTMCNC: 31.9 GM/DL — LOW (ref 32–36)
MCV RBC AUTO: 96.2 FL — SIGNIFICANT CHANGE UP (ref 80–100)
METHOD TYPE: SIGNIFICANT CHANGE UP
MONOCYTES # BLD AUTO: 0.65 K/UL — SIGNIFICANT CHANGE UP (ref 0–0.9)
MONOCYTES NFR BLD AUTO: 5.6 % — SIGNIFICANT CHANGE UP (ref 2–14)
NEUTROPHILS # BLD AUTO: 6.42 K/UL — SIGNIFICANT CHANGE UP (ref 1.8–7.4)
NEUTROPHILS NFR BLD AUTO: 55.3 % — SIGNIFICANT CHANGE UP (ref 43–77)
NRBC # BLD: 0 /100 WBCS — SIGNIFICANT CHANGE UP (ref 0–0)
ORGANISM # SPEC MICROSCOPIC CNT: SIGNIFICANT CHANGE UP
PHOSPHATE SERPL-MCNC: 3 MG/DL — SIGNIFICANT CHANGE UP (ref 2.5–4.5)
PLATELET # BLD AUTO: 225 K/UL — SIGNIFICANT CHANGE UP (ref 150–400)
POTASSIUM SERPL-MCNC: 4 MMOL/L — SIGNIFICANT CHANGE UP (ref 3.5–5.3)
POTASSIUM SERPL-SCNC: 4 MMOL/L — SIGNIFICANT CHANGE UP (ref 3.5–5.3)
PROT SERPL-MCNC: 7 G/DL — SIGNIFICANT CHANGE UP (ref 6–8.3)
RBC # BLD: 4.21 M/UL — SIGNIFICANT CHANGE UP (ref 4.2–5.8)
RBC # FLD: 14.1 % — SIGNIFICANT CHANGE UP (ref 10.3–14.5)
SODIUM SERPL-SCNC: 142 MMOL/L — SIGNIFICANT CHANGE UP (ref 135–145)
SPECIMEN SOURCE: SIGNIFICANT CHANGE UP
SPECIMEN SOURCE: SIGNIFICANT CHANGE UP
VANCOMYCIN TROUGH SERPL-MCNC: 22.4 UG/ML — HIGH (ref 10–20)
VANCOMYCIN TROUGH SERPL-MCNC: 22.9 UG/ML — HIGH (ref 10–20)
WBC # BLD: 11.61 K/UL — HIGH (ref 3.8–10.5)
WBC # FLD AUTO: 11.61 K/UL — HIGH (ref 3.8–10.5)

## 2021-10-09 PROCEDURE — 99232 SBSQ HOSP IP/OBS MODERATE 35: CPT

## 2021-10-09 RX ORDER — VANCOMYCIN HCL 1 G
1500 VIAL (EA) INTRAVENOUS EVERY 24 HOURS
Refills: 0 | Status: DISCONTINUED | OUTPATIENT
Start: 2021-10-10 | End: 2021-10-11

## 2021-10-09 RX ADMIN — Medication 650 MILLIGRAM(S): at 18:08

## 2021-10-09 RX ADMIN — Medication 2 SPRAY(S): at 15:20

## 2021-10-09 RX ADMIN — Medication 650 MILLIGRAM(S): at 19:00

## 2021-10-09 RX ADMIN — PIPERACILLIN AND TAZOBACTAM 200 GRAM(S): 4; .5 INJECTION, POWDER, LYOPHILIZED, FOR SOLUTION INTRAVENOUS at 18:08

## 2021-10-09 RX ADMIN — Medication 650 MILLIGRAM(S): at 12:11

## 2021-10-09 RX ADMIN — LATANOPROST 1 DROP(S): 0.05 SOLUTION/ DROPS OPHTHALMIC; TOPICAL at 22:52

## 2021-10-09 RX ADMIN — PIPERACILLIN AND TAZOBACTAM 200 GRAM(S): 4; .5 INJECTION, POWDER, LYOPHILIZED, FOR SOLUTION INTRAVENOUS at 05:48

## 2021-10-09 RX ADMIN — Medication 650 MILLIGRAM(S): at 06:49

## 2021-10-09 RX ADMIN — Medication 650 MILLIGRAM(S): at 23:01

## 2021-10-09 RX ADMIN — DORZOLAMIDE HYDROCHLORIDE TIMOLOL MALEATE 1 DROP(S): 20; 5 SOLUTION/ DROPS OPHTHALMIC at 05:48

## 2021-10-09 RX ADMIN — DORZOLAMIDE HYDROCHLORIDE TIMOLOL MALEATE 1 DROP(S): 20; 5 SOLUTION/ DROPS OPHTHALMIC at 18:08

## 2021-10-09 RX ADMIN — Medication 650 MILLIGRAM(S): at 00:29

## 2021-10-09 RX ADMIN — PIPERACILLIN AND TAZOBACTAM 200 GRAM(S): 4; .5 INJECTION, POWDER, LYOPHILIZED, FOR SOLUTION INTRAVENOUS at 23:00

## 2021-10-09 RX ADMIN — Medication 2 SPRAY(S): at 05:48

## 2021-10-09 RX ADMIN — Medication 650 MILLIGRAM(S): at 13:00

## 2021-10-09 RX ADMIN — PIPERACILLIN AND TAZOBACTAM 200 GRAM(S): 4; .5 INJECTION, POWDER, LYOPHILIZED, FOR SOLUTION INTRAVENOUS at 12:11

## 2021-10-09 RX ADMIN — Medication 2 SPRAY(S): at 22:52

## 2021-10-09 RX ADMIN — Medication 650 MILLIGRAM(S): at 05:49

## 2021-10-09 NOTE — PROGRESS NOTE ADULT - ASSESSMENT
60 yo male with nasal adenoid cystic ca, sinusitis with MRSA, Enterobacter, Pseudomonas; emergent ROBYN in setting of vancomycin and Zosyn exposure.  - f/u bcx 10/8  - advise change vancomycin to 1.5g IV q24h (next dose tomorrow) and repeat trough prior to dose on Monday 10/11  - continue Zosyn 4.5g IV q6h    D/w primary team    Dr. Mcgraw ID Team 1 resumes care Mon 10/11

## 2021-10-10 LAB
ALBUMIN SERPL ELPH-MCNC: 3.7 G/DL — SIGNIFICANT CHANGE UP (ref 3.3–5)
ALP SERPL-CCNC: 79 U/L — SIGNIFICANT CHANGE UP (ref 40–120)
ALT FLD-CCNC: 15 U/L — SIGNIFICANT CHANGE UP (ref 10–45)
ANION GAP SERPL CALC-SCNC: 9 MMOL/L — SIGNIFICANT CHANGE UP (ref 5–17)
AST SERPL-CCNC: 18 U/L — SIGNIFICANT CHANGE UP (ref 10–40)
BILIRUB SERPL-MCNC: 0.4 MG/DL — SIGNIFICANT CHANGE UP (ref 0.2–1.2)
BUN SERPL-MCNC: 16 MG/DL — SIGNIFICANT CHANGE UP (ref 7–23)
CALCIUM SERPL-MCNC: 9.6 MG/DL — SIGNIFICANT CHANGE UP (ref 8.4–10.5)
CHLORIDE SERPL-SCNC: 104 MMOL/L — SIGNIFICANT CHANGE UP (ref 96–108)
CO2 SERPL-SCNC: 28 MMOL/L — SIGNIFICANT CHANGE UP (ref 22–31)
CREAT SERPL-MCNC: 1.4 MG/DL — HIGH (ref 0.5–1.3)
GLUCOSE SERPL-MCNC: 100 MG/DL — HIGH (ref 70–99)
HCT VFR BLD CALC: 40.1 % — SIGNIFICANT CHANGE UP (ref 39–50)
HGB BLD-MCNC: 13.1 G/DL — SIGNIFICANT CHANGE UP (ref 13–17)
MAGNESIUM SERPL-MCNC: 1.9 MG/DL — SIGNIFICANT CHANGE UP (ref 1.6–2.6)
MCHC RBC-ENTMCNC: 32 PG — SIGNIFICANT CHANGE UP (ref 27–34)
MCHC RBC-ENTMCNC: 32.7 GM/DL — SIGNIFICANT CHANGE UP (ref 32–36)
MCV RBC AUTO: 98 FL — SIGNIFICANT CHANGE UP (ref 80–100)
NRBC # BLD: 0 /100 WBCS — SIGNIFICANT CHANGE UP (ref 0–0)
PHOSPHATE SERPL-MCNC: 3.7 MG/DL — SIGNIFICANT CHANGE UP (ref 2.5–4.5)
PLATELET # BLD AUTO: 215 K/UL — SIGNIFICANT CHANGE UP (ref 150–400)
POTASSIUM SERPL-MCNC: 4.1 MMOL/L — SIGNIFICANT CHANGE UP (ref 3.5–5.3)
POTASSIUM SERPL-SCNC: 4.1 MMOL/L — SIGNIFICANT CHANGE UP (ref 3.5–5.3)
PROT SERPL-MCNC: 6.9 G/DL — SIGNIFICANT CHANGE UP (ref 6–8.3)
RBC # BLD: 4.09 M/UL — LOW (ref 4.2–5.8)
RBC # FLD: 14.1 % — SIGNIFICANT CHANGE UP (ref 10.3–14.5)
SODIUM SERPL-SCNC: 141 MMOL/L — SIGNIFICANT CHANGE UP (ref 135–145)
WBC # BLD: 8.71 K/UL — SIGNIFICANT CHANGE UP (ref 3.8–10.5)
WBC # FLD AUTO: 8.71 K/UL — SIGNIFICANT CHANGE UP (ref 3.8–10.5)

## 2021-10-10 RX ADMIN — Medication 2 SPRAY(S): at 05:31

## 2021-10-10 RX ADMIN — Medication 650 MILLIGRAM(S): at 19:23

## 2021-10-10 RX ADMIN — Medication 300 MILLIGRAM(S): at 08:34

## 2021-10-10 RX ADMIN — Medication 650 MILLIGRAM(S): at 13:01

## 2021-10-10 RX ADMIN — PIPERACILLIN AND TAZOBACTAM 200 GRAM(S): 4; .5 INJECTION, POWDER, LYOPHILIZED, FOR SOLUTION INTRAVENOUS at 23:00

## 2021-10-10 RX ADMIN — Medication 650 MILLIGRAM(S): at 00:01

## 2021-10-10 RX ADMIN — Medication 2 SPRAY(S): at 21:12

## 2021-10-10 RX ADMIN — LATANOPROST 1 DROP(S): 0.05 SOLUTION/ DROPS OPHTHALMIC; TOPICAL at 21:12

## 2021-10-10 RX ADMIN — Medication 650 MILLIGRAM(S): at 18:04

## 2021-10-10 RX ADMIN — Medication 650 MILLIGRAM(S): at 23:00

## 2021-10-10 RX ADMIN — Medication 2 SPRAY(S): at 13:02

## 2021-10-10 RX ADMIN — Medication 650 MILLIGRAM(S): at 05:33

## 2021-10-10 RX ADMIN — PIPERACILLIN AND TAZOBACTAM 200 GRAM(S): 4; .5 INJECTION, POWDER, LYOPHILIZED, FOR SOLUTION INTRAVENOUS at 05:33

## 2021-10-10 RX ADMIN — PIPERACILLIN AND TAZOBACTAM 200 GRAM(S): 4; .5 INJECTION, POWDER, LYOPHILIZED, FOR SOLUTION INTRAVENOUS at 18:04

## 2021-10-10 RX ADMIN — DORZOLAMIDE HYDROCHLORIDE TIMOLOL MALEATE 1 DROP(S): 20; 5 SOLUTION/ DROPS OPHTHALMIC at 05:31

## 2021-10-10 RX ADMIN — Medication 650 MILLIGRAM(S): at 06:33

## 2021-10-10 RX ADMIN — Medication 650 MILLIGRAM(S): at 12:24

## 2021-10-10 RX ADMIN — DORZOLAMIDE HYDROCHLORIDE TIMOLOL MALEATE 1 DROP(S): 20; 5 SOLUTION/ DROPS OPHTHALMIC at 18:01

## 2021-10-10 RX ADMIN — PIPERACILLIN AND TAZOBACTAM 200 GRAM(S): 4; .5 INJECTION, POWDER, LYOPHILIZED, FOR SOLUTION INTRAVENOUS at 12:24

## 2021-10-11 LAB
ALBUMIN SERPL ELPH-MCNC: 3.5 G/DL — SIGNIFICANT CHANGE UP (ref 3.3–5)
ALP SERPL-CCNC: 75 U/L — SIGNIFICANT CHANGE UP (ref 40–120)
ALT FLD-CCNC: 15 U/L — SIGNIFICANT CHANGE UP (ref 10–45)
ANION GAP SERPL CALC-SCNC: 10 MMOL/L — SIGNIFICANT CHANGE UP (ref 5–17)
AST SERPL-CCNC: 19 U/L — SIGNIFICANT CHANGE UP (ref 10–40)
BILIRUB SERPL-MCNC: 0.4 MG/DL — SIGNIFICANT CHANGE UP (ref 0.2–1.2)
BUN SERPL-MCNC: 15 MG/DL — SIGNIFICANT CHANGE UP (ref 7–23)
CALCIUM SERPL-MCNC: 9.3 MG/DL — SIGNIFICANT CHANGE UP (ref 8.4–10.5)
CHLORIDE SERPL-SCNC: 102 MMOL/L — SIGNIFICANT CHANGE UP (ref 96–108)
CO2 SERPL-SCNC: 27 MMOL/L — SIGNIFICANT CHANGE UP (ref 22–31)
CREAT SERPL-MCNC: 1.4 MG/DL — HIGH (ref 0.5–1.3)
GLUCOSE SERPL-MCNC: 100 MG/DL — HIGH (ref 70–99)
HCT VFR BLD CALC: 38.9 % — LOW (ref 39–50)
HGB BLD-MCNC: 12.5 G/DL — LOW (ref 13–17)
MAGNESIUM SERPL-MCNC: 2 MG/DL — SIGNIFICANT CHANGE UP (ref 1.6–2.6)
MCHC RBC-ENTMCNC: 30.9 PG — SIGNIFICANT CHANGE UP (ref 27–34)
MCHC RBC-ENTMCNC: 32.1 GM/DL — SIGNIFICANT CHANGE UP (ref 32–36)
MCV RBC AUTO: 96.3 FL — SIGNIFICANT CHANGE UP (ref 80–100)
NRBC # BLD: 0 /100 WBCS — SIGNIFICANT CHANGE UP (ref 0–0)
PHOSPHATE SERPL-MCNC: 3.7 MG/DL — SIGNIFICANT CHANGE UP (ref 2.5–4.5)
PLATELET # BLD AUTO: 231 K/UL — SIGNIFICANT CHANGE UP (ref 150–400)
POTASSIUM SERPL-MCNC: 3.9 MMOL/L — SIGNIFICANT CHANGE UP (ref 3.5–5.3)
POTASSIUM SERPL-SCNC: 3.9 MMOL/L — SIGNIFICANT CHANGE UP (ref 3.5–5.3)
PROT SERPL-MCNC: 6.9 G/DL — SIGNIFICANT CHANGE UP (ref 6–8.3)
RBC # BLD: 4.04 M/UL — LOW (ref 4.2–5.8)
RBC # FLD: 13.9 % — SIGNIFICANT CHANGE UP (ref 10.3–14.5)
SODIUM SERPL-SCNC: 139 MMOL/L — SIGNIFICANT CHANGE UP (ref 135–145)
VANCOMYCIN TROUGH SERPL-MCNC: 20.6 UG/ML — HIGH (ref 10–20)
VANCOMYCIN TROUGH SERPL-MCNC: 48.4 UG/ML — CRITICAL HIGH (ref 10–20)
WBC # BLD: 9.35 K/UL — SIGNIFICANT CHANGE UP (ref 3.8–10.5)
WBC # FLD AUTO: 9.35 K/UL — SIGNIFICANT CHANGE UP (ref 3.8–10.5)

## 2021-10-11 PROCEDURE — 99232 SBSQ HOSP IP/OBS MODERATE 35: CPT | Mod: GC

## 2021-10-11 RX ORDER — VANCOMYCIN HCL 1 G
1.25 VIAL (EA) INTRAVENOUS
Qty: 92.5 | Refills: 0
Start: 2021-10-11 | End: 2021-11-16

## 2021-10-11 RX ORDER — VANCOMYCIN HCL 1 G
1250 VIAL (EA) INTRAVENOUS EVERY 12 HOURS
Refills: 0 | Status: DISCONTINUED | OUTPATIENT
Start: 2021-10-11 | End: 2021-10-12

## 2021-10-11 RX ORDER — PIPERACILLIN AND TAZOBACTAM 4; .5 G/20ML; G/20ML
18 INJECTION, POWDER, LYOPHILIZED, FOR SOLUTION INTRAVENOUS
Qty: 666 | Refills: 0
Start: 2021-10-11 | End: 2021-11-16

## 2021-10-11 RX ADMIN — LATANOPROST 1 DROP(S): 0.05 SOLUTION/ DROPS OPHTHALMIC; TOPICAL at 22:10

## 2021-10-11 RX ADMIN — PIPERACILLIN AND TAZOBACTAM 200 GRAM(S): 4; .5 INJECTION, POWDER, LYOPHILIZED, FOR SOLUTION INTRAVENOUS at 17:49

## 2021-10-11 RX ADMIN — Medication 650 MILLIGRAM(S): at 14:11

## 2021-10-11 RX ADMIN — PIPERACILLIN AND TAZOBACTAM 200 GRAM(S): 4; .5 INJECTION, POWDER, LYOPHILIZED, FOR SOLUTION INTRAVENOUS at 05:31

## 2021-10-11 RX ADMIN — Medication 300 MILLIGRAM(S): at 10:56

## 2021-10-11 RX ADMIN — PIPERACILLIN AND TAZOBACTAM 200 GRAM(S): 4; .5 INJECTION, POWDER, LYOPHILIZED, FOR SOLUTION INTRAVENOUS at 23:42

## 2021-10-11 RX ADMIN — Medication 650 MILLIGRAM(S): at 05:32

## 2021-10-11 RX ADMIN — Medication 650 MILLIGRAM(S): at 18:49

## 2021-10-11 RX ADMIN — DORZOLAMIDE HYDROCHLORIDE TIMOLOL MALEATE 1 DROP(S): 20; 5 SOLUTION/ DROPS OPHTHALMIC at 18:49

## 2021-10-11 RX ADMIN — DORZOLAMIDE HYDROCHLORIDE TIMOLOL MALEATE 1 DROP(S): 20; 5 SOLUTION/ DROPS OPHTHALMIC at 05:34

## 2021-10-11 RX ADMIN — Medication 2 SPRAY(S): at 05:35

## 2021-10-11 RX ADMIN — Medication 650 MILLIGRAM(S): at 06:32

## 2021-10-11 RX ADMIN — Medication 650 MILLIGRAM(S): at 13:01

## 2021-10-11 RX ADMIN — PIPERACILLIN AND TAZOBACTAM 200 GRAM(S): 4; .5 INJECTION, POWDER, LYOPHILIZED, FOR SOLUTION INTRAVENOUS at 13:01

## 2021-10-11 RX ADMIN — Medication 650 MILLIGRAM(S): at 23:42

## 2021-10-11 RX ADMIN — Medication 2 SPRAY(S): at 13:11

## 2021-10-11 RX ADMIN — Medication 2 SPRAY(S): at 22:10

## 2021-10-11 RX ADMIN — Medication 650 MILLIGRAM(S): at 17:49

## 2021-10-11 NOTE — PROGRESS NOTE ADULT - ASSESSMENT
61M with recurrent adenoid cystic CA found to have active infection of nasal cavity and nasopharynx at time of attempted resection.  Prior cultures from 8/2021 with MSSA, Strep pyogenes, Strep mitis/oralis and Pseudomonas.  He was treated with clinda - the MSSA, Strep pyogenes and Strep mitis/oralis were susceptible.  Clinda has no activity against Pseudomonas.  Found to have sinusitis with MRSA, Enterobacter, Pseudomonas    Recs:   - PICC line    - dc on vancomycin 1.5 g IV q24h X 6wks ( Day 1=10/7; Last Day=11/17)  - dc pip-tazo 18g IV continuos infusion for 24hrs X 6 wks  (Day 1=10/7; Last day =11/17)  - CBC, CMP, ESR, CRP weekly, fax to Dr. Mcgraw. Pt will f/u w/ Dr. Matos we will arrange      Recommendations discussed with primary team.  Team 1 Will sign off.   See attending attestation for final recs      61M with recurrent adenoid cystic CA found to have active infection of nasal cavity and nasopharynx at time of attempted resection.  Prior cultures from 8/2021 with MSSA, Strep pyogenes, Strep mitis/oralis and Pseudomonas.  He was treated with clinda - the MSSA, Strep pyogenes and Strep mitis/oralis were susceptible.  Clinda has no activity against Pseudomonas.  Found to have sinusitis with MRSA, Enterobacter, Pseudomonas    Recs:   - PICC line    - dc on vancomycin 1.5 g IV q24h X 6wks ( Day 1=10/7; Last Day=11/17)  - dc on pip-tazo 18g IV continuos infusion for 24hrs X 6 wks  (Day 1=10/7; Last day =11/17)  - CBC, CMP, ESR, CRP weekly, fax to Dr. Mcgraw. Pt will f/u w/ Dr. Mcgraw as outpt, we will arrange      Recommendations discussed with primary team.  Team 1 will sign off,. call back if have questions.   See attending attestation for final recs      61M with recurrent adenoid cystic CA found to have active infection of nasal cavity and nasopharynx at time of attempted resection.  Prior cultures from 8/2021 with MSSA, Strep pyogenes, Strep mitis/oralis and Pseudomonas.  He was treated with clinda - the MSSA, Strep pyogenes and Strep mitis/oralis were susceptible.  Clinda has no activity against Pseudomonas.  Found to have sinusitis with MRSA, Enterobacter, Pseudomonas    Recs:   - PICC line    - dc on vancomycin X 6wks ( Day 1=10/7; Last Day=11/17), dose to be determined  - dc on pip-tazo 18g IV continuos infusion for 24hrs X 6 wks  (Day 1=10/7; Last day =11/17)  - CBC, CMP, ESR, CRP weekly, fax to Dr. Mcgraw. Pt will f/u w/ Dr. Mcgraw as outpt, we will arrange      Recommendations discussed with primary team.  Will follow with you - team 1.   See attending attestation for final recs

## 2021-10-12 ENCOUNTER — TRANSCRIPTION ENCOUNTER (OUTPATIENT)
Age: 61
End: 2021-10-12

## 2021-10-12 VITALS
SYSTOLIC BLOOD PRESSURE: 119 MMHG | HEART RATE: 70 BPM | OXYGEN SATURATION: 98 % | TEMPERATURE: 98 F | DIASTOLIC BLOOD PRESSURE: 79 MMHG | RESPIRATION RATE: 16 BRPM

## 2021-10-12 LAB
ALBUMIN SERPL ELPH-MCNC: 3.6 G/DL — SIGNIFICANT CHANGE UP (ref 3.3–5)
ALP SERPL-CCNC: 75 U/L — SIGNIFICANT CHANGE UP (ref 40–120)
ALT FLD-CCNC: 16 U/L — SIGNIFICANT CHANGE UP (ref 10–45)
ANION GAP SERPL CALC-SCNC: 8 MMOL/L — SIGNIFICANT CHANGE UP (ref 5–17)
AST SERPL-CCNC: 19 U/L — SIGNIFICANT CHANGE UP (ref 10–40)
BILIRUB SERPL-MCNC: 0.4 MG/DL — SIGNIFICANT CHANGE UP (ref 0.2–1.2)
BUN SERPL-MCNC: 14 MG/DL — SIGNIFICANT CHANGE UP (ref 7–23)
CALCIUM SERPL-MCNC: 9.6 MG/DL — SIGNIFICANT CHANGE UP (ref 8.4–10.5)
CHLORIDE SERPL-SCNC: 105 MMOL/L — SIGNIFICANT CHANGE UP (ref 96–108)
CO2 SERPL-SCNC: 28 MMOL/L — SIGNIFICANT CHANGE UP (ref 22–31)
CREAT SERPL-MCNC: 1.46 MG/DL — HIGH (ref 0.5–1.3)
GLUCOSE SERPL-MCNC: 130 MG/DL — HIGH (ref 70–99)
HCT VFR BLD CALC: 39.7 % — SIGNIFICANT CHANGE UP (ref 39–50)
HGB BLD-MCNC: 12.9 G/DL — LOW (ref 13–17)
MAGNESIUM SERPL-MCNC: 2 MG/DL — SIGNIFICANT CHANGE UP (ref 1.6–2.6)
MCHC RBC-ENTMCNC: 31.6 PG — SIGNIFICANT CHANGE UP (ref 27–34)
MCHC RBC-ENTMCNC: 32.5 GM/DL — SIGNIFICANT CHANGE UP (ref 32–36)
MCV RBC AUTO: 97.3 FL — SIGNIFICANT CHANGE UP (ref 80–100)
NRBC # BLD: 0 /100 WBCS — SIGNIFICANT CHANGE UP (ref 0–0)
PHOSPHATE SERPL-MCNC: 3.7 MG/DL — SIGNIFICANT CHANGE UP (ref 2.5–4.5)
PLATELET # BLD AUTO: 259 K/UL — SIGNIFICANT CHANGE UP (ref 150–400)
POTASSIUM SERPL-MCNC: 4.5 MMOL/L — SIGNIFICANT CHANGE UP (ref 3.5–5.3)
POTASSIUM SERPL-SCNC: 4.5 MMOL/L — SIGNIFICANT CHANGE UP (ref 3.5–5.3)
PROT SERPL-MCNC: 6.8 G/DL — SIGNIFICANT CHANGE UP (ref 6–8.3)
RBC # BLD: 4.08 M/UL — LOW (ref 4.2–5.8)
RBC # FLD: 13.9 % — SIGNIFICANT CHANGE UP (ref 10.3–14.5)
SODIUM SERPL-SCNC: 141 MMOL/L — SIGNIFICANT CHANGE UP (ref 135–145)
WBC # BLD: 9.5 K/UL — SIGNIFICANT CHANGE UP (ref 3.8–10.5)
WBC # FLD AUTO: 9.5 K/UL — SIGNIFICANT CHANGE UP (ref 3.8–10.5)

## 2021-10-12 PROCEDURE — 76937 US GUIDE VASCULAR ACCESS: CPT | Mod: 26,59

## 2021-10-12 PROCEDURE — 36569 INSJ PICC 5 YR+ W/O IMAGING: CPT

## 2021-10-12 PROCEDURE — 99232 SBSQ HOSP IP/OBS MODERATE 35: CPT | Mod: GC

## 2021-10-12 RX ORDER — CHLORHEXIDINE GLUCONATE 213 G/1000ML
1 SOLUTION TOPICAL
Refills: 0 | Status: DISCONTINUED | OUTPATIENT
Start: 2021-10-12 | End: 2021-10-12

## 2021-10-12 RX ORDER — SODIUM CHLORIDE 0.65 %
2 AEROSOL, SPRAY (ML) NASAL
Qty: 200 | Refills: 0
Start: 2021-10-12 | End: 2021-11-10

## 2021-10-12 RX ORDER — PIPERACILLIN AND TAZOBACTAM 4; .5 G/20ML; G/20ML
18 INJECTION, POWDER, LYOPHILIZED, FOR SOLUTION INTRAVENOUS
Qty: 0 | Refills: 0 | DISCHARGE
Start: 2021-10-12

## 2021-10-12 RX ORDER — SODIUM CHLORIDE 9 MG/ML
10 INJECTION INTRAMUSCULAR; INTRAVENOUS; SUBCUTANEOUS
Refills: 0 | Status: DISCONTINUED | OUTPATIENT
Start: 2021-10-12 | End: 2021-10-12

## 2021-10-12 RX ADMIN — Medication 650 MILLIGRAM(S): at 12:25

## 2021-10-12 RX ADMIN — DORZOLAMIDE HYDROCHLORIDE TIMOLOL MALEATE 1 DROP(S): 20; 5 SOLUTION/ DROPS OPHTHALMIC at 06:21

## 2021-10-12 RX ADMIN — Medication 650 MILLIGRAM(S): at 07:30

## 2021-10-12 RX ADMIN — PIPERACILLIN AND TAZOBACTAM 200 GRAM(S): 4; .5 INJECTION, POWDER, LYOPHILIZED, FOR SOLUTION INTRAVENOUS at 06:48

## 2021-10-12 RX ADMIN — PIPERACILLIN AND TAZOBACTAM 200 GRAM(S): 4; .5 INJECTION, POWDER, LYOPHILIZED, FOR SOLUTION INTRAVENOUS at 11:24

## 2021-10-12 RX ADMIN — Medication 166.67 MILLIGRAM(S): at 12:37

## 2021-10-12 RX ADMIN — Medication 650 MILLIGRAM(S): at 11:24

## 2021-10-12 RX ADMIN — Medication 166.67 MILLIGRAM(S): at 00:43

## 2021-10-12 RX ADMIN — Medication 650 MILLIGRAM(S): at 06:48

## 2021-10-12 RX ADMIN — Medication 650 MILLIGRAM(S): at 00:30

## 2021-10-12 RX ADMIN — Medication 2 SPRAY(S): at 06:48

## 2021-10-12 NOTE — DISCHARGE NOTE PROVIDER - CARE PROVIDERS DIRECT ADDRESSES
[email or web address]

## 2021-10-12 NOTE — CONSULT NOTE ADULT - SUBJECTIVE AND OBJECTIVE BOX
Vascular Access Service Consult Note    [facility] ISSUES - PROBLEM Dx:  Adenoid cystic carcinoma               Diagnosis: nasal cavity infection    Indications for Vascular Access (Check all that apply)  [ x ]  Antibiotic Therapy       Antibiotic Prescribed:   vancomycin and cefepime x 6 weeks                                                                                     [  ]  IV Hydration  [  ]  Total Parenteral Nutrition  [  ]  Chemotherapy  [  ]  Difficult Venous Access  [  ]  CVP monitoring  [  ]  Medications with high potential for tissue necrosis on extravasation  [  ]  Other    Screening (Check all that apply)  Previous Radiation to chest  [  ] Yes      [  x]  No  Breast Cancer                          [  ] Left     [  ]  Right    [ x ]  No  Lymph Node Dissection         [  ] Left     [  ]  Right    [ x ]  No  Pacemaker or ICD                   [  ] Left     [  ]  Right    [x  ]  No  Upper Extremity DVT             [  ] Left     [  ]  Right    [  x]  No  Chronic Kidney Disease         [  ]  Yes     [x  ]  No  Hemodialysis                           [  ]  Yes     [ x ]  No  AV Fistula/ Graft                     [  ]  Left    [  ]  Right    [x  ]  No  Temp>101F in past 24 H       [  ]  Yes     [ x ]  No  H/O PICC/Midline                   [  ]  Yes     [x  ]  No    Lab data:                        12.9   9.50  )-----------( 259      ( 12 Oct 2021 08:17 )             39.7     10-12    141  |  105  |  14  ----------------------------<  130<H>  4.5   |  28  |  1.46<H>    Ca    9.6      12 Oct 2021 08:17  Phos  3.7     10-12  Mg     2.0     10-12    TPro  6.8  /  Alb  3.6  /  TBili  0.4  /  DBili  x   /  AST  19  /  ALT  16  /  AlkPhos  75  10-12              I have reviewed the chart, interviewed and examined the patient and determined that this patient:  [ x ] Is a candidate for a PICC line  [  ] Is a candidate for a Midline  [  ] Is not a candidate for vascular access device (reason)    Lumens:    [  ] Single  [ x ] Double
HPI:  62 yo M with adenoid cystic CA of skull base with recurrence at sphenoid sinus admitted 10/7 for tumor resection, found to have infection.  ID consult for assistance with management of infection.  He presented with dizziness, vomiting and blurriness of L eye vision and was found to have sellar lesion with extension into sphenoid structures, cavernous sinus and prepontine region.   Endonasal biopsy 5/2018 showed adenoid cystic CA (salivary origin). He was treated with RT (courses completed 6/2018 and 8/2018). In 10/2018, he underwent FESS of bilateral sphenoid, ethmoid and maxillary sinuses.  He was found to have recurrent tumor on imaging on 7/8/21.  On 8/24, he underwent endoscopic endonasal biopsy.  Purulent drainage was noted bilaterally stemming from the maxillary sinus.  Bilateral maxillary antrostomies were performed and cultures were sent.  Cultures grew MSSA, Strep pyogenes and Pseudomonas.  He was started on clinda.  PET CT showed recurrence of tumor and cervical LNs that were possibly inflammatory.  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  He returned today for surgical removal of tumor.  In the OR, active infection of the nasal cavity and nasopharynx with necrosis of nasal septum was seen.  Debridement of the nasal septum and bilateral middle and inferior turbinates was done.  Cultures were sent. The case then was aborted.  He was started on clinda IV.  ID consult requested.  Per Mr. Sinha, he has had no fevers, chills, facial pain.  Has scant blood tinged nasal discharge.  Has HA, dizziness and blurring of vision OS but otherwise feels well.        PAST MEDICAL & SURGICAL HISTORY:  HTN (hypertension)    Glaucoma    H/O gastroesophageal reflux (GERD)    Cancer of adenoid  chemo &amp; RT    Malignant neoplasm of nasal cavity    DM (diabetes mellitus)  type 2, not on meds    History of cholecystectomy    Right cataract    History of nasal surgery  biopsy            MEDICATIONS  (STANDING):  acetaminophen   Tablet .. 650 milliGRAM(s) Oral every 6 hours  lactated ringers. 1000 milliLiter(s) (100 mL/Hr) IV Continuous <Continuous>  piperacillin/tazobactam IVPB.. 4.5 Gram(s) IV Intermittent every 6 hours  vancomycin  IVPB 1500 milliGRAM(s) IV Intermittent every 12 hours    MEDICATIONS  (PRN):  diphenhydrAMINE 25 milliGRAM(s) Oral every 4 hours PRN Rash and/or Itching  famotidine    Tablet 20 milliGRAM(s) Oral every 12 hours PRN Dyspepsia  ondansetron Injectable 4 milliGRAM(s) IV Push every 6 hours PRN Nausea  oxycodone    5 mG/acetaminophen 325 mG 1 Tablet(s) Oral every 4 hours PRN Moderate Pain (4 - 6)      Allergies    penicillin (Rash) - as a child.  He does not remember.    Intolerances        SOCIAL HISTORY:  Is originally from the [place].  Lives with his daughter.  Previously worked as a .  No tobacco, rare alcohol, no recreational drug use.    FAMILY HISTORY:  Type 2 diabetes mellitus (Mother)  Mother had T2DM.    Hypertension (Sibling)  Has brother with arterial hypertension.        Vital Signs Last 24 Hrs  T(C): 36.7 (07 Oct 2021 16:28), Max: 36.9 (07 Oct 2021 06:58)  T(F): 98 (07 Oct 2021 16:28), Max: 98.5 (07 Oct 2021 06:58)  HR: 95 (07 Oct 2021 16:28) (82 - 95)  BP: 131/86 (07 Oct 2021 16:28) (131/86 - 162/94)  BP(mean): 112 (07 Oct 2021 12:49) (108 - 119)  RR: 17 (07 Oct 2021 16:28) (10 - 21)  SpO2: 95% (07 Oct 2021 16:28) (94% - 100%)    PE:  WDWN in no distress  HEENT:  NC, PERRL, sclerae anicteric, conjunctivae clear, EOMI.  Sinuses nontender, no nasal exudate.  No buccal or pharyngeal lesions, erythema or exudate  Neck:  Supple, no adenopathy  Lungs:  Clear to auscultation  Cor:  RRR, S1, S2, no murmur appreciated  Abd:  Symmetric, normoactive BS.  Soft, nontender, no masses, guarding or rebound.  Liver and spleen not enlarged  Extrem:  No cyanosis or edema  Skin:  No rashes.    LABS:      9/23/2021:  BUN 10, Cr 1.2  WBC 7.0        RADIOLOGY & ADDITIONAL STUDIES:

## 2021-10-12 NOTE — DISCHARGE NOTE PROVIDER - HOSPITAL COURSE
61m w/ recurrent adenoid cystic cacinoma of skull base with extension into sphenoid planned for excision but aborted because of active infection s/p posterior septum/MT/IT turb resection due to necrotic infection on 10/7. Surgical cultures were taken. Patient remained inpatient for IV abx. Final cultures (MRSA, Enterobacter, Pseudomonas) showed sensitivity to vanc zosyn. Patient will be discharged with home infusion services on long term antibiotics. Patient is medically stable for discharge to home. Patient will likely be rescheduled for surgery in ~2 weeks.    Plan:  - Take IV abx as prescribed or until surgery is rescheduled (abx will be resumed following surgery)  - Follow-up with ID specialist as scheduled  - Weekly labs as prescribed  - Follow-up with Dr. Stewart  - Continue home medications 61m w/ recurrent adenoid cystic cacinoma of skull base with extension into sphenoid planned for excision but aborted because of active infection s/p posterior septum/MT/IT turb resection due to necrotic infection on 10/7. Surgical cultures were taken. Patient remained inpatient for IV abx. Final cultures (MRSA, Enterobacter, Pseudomonas) showed sensitivity to vanc zosyn. Patient will be discharged with home infusion services on long term antibiotics. Patient is medically stable for discharge to home. Patient will likely be rescheduled for surgery in ~2 weeks.    Plan:  - Take IV abx as prescribed or until surgery is rescheduled (abx will be resumed following surgery)  - Follow-up with ID specialist as scheduled  - Weekly labs as prescribed  - Follow-up with Dr. Stewart on Wednesday  - Continue home medications

## 2021-10-12 NOTE — DISCHARGE NOTE NURSING/CASE MANAGEMENT/SOCIAL WORK - CAREGIVER ADDRESS
Have Your Spot(S) Been Treated In The Past?: has not been treated Hpi Title: Evaluation of a Skin Lesion Family Member: father Year Removed: 1900 /

## 2021-10-12 NOTE — DISCHARGE NOTE PROVIDER - NSDCCPCAREPLAN_GEN_ALL_CORE_FT
PRINCIPAL DISCHARGE DIAGNOSIS  Diagnosis: Osteomyelitis of the skull  Assessment and Plan of Treatment:

## 2021-10-12 NOTE — PROGRESS NOTE ADULT - SUBJECTIVE AND OBJECTIVE BOX
INFECTIOUS DISEASES FOLLOW UP    SUBJECTIVE: Pt see w/ . Reports feeling well. Denies f/c, HA, nasal discharge, SOB, abd pain and dysuria.      ROS:  negative except as above    Allergies    No Known Allergies    Intolerances      ANTIBIOTICS/RELEVANT:  antimicrobials  Vanc  Zosyn     immunologic:  influenza  Vaccine (HIGH DOSE) 0.7 milliLiter(s) IntraMuscular once    OTHER:  acetaminophen   Tablet .. 650 milliGRAM(s) Oral every 6 hours PRN  atorvastatin 40 milliGRAM(s) Oral at bedtime  diltiazem    Tablet 60 milliGRAM(s) Oral every 6 hours  heparin  Infusion 1800 Unit(s)/Hr IV Continuous <Continuous>  insulin glargine Injectable (LANTUS) 6 Unit(s) SubCutaneous at bedtime  insulin lispro (ADMELOG) corrective regimen sliding scale   SubCutaneous Before meals and at bedtime  insulin lispro Injectable (ADMELOG) 3 Unit(s) SubCutaneous three times a day before meals  melatonin 5 milliGRAM(s) Oral at bedtime PRN  metoprolol tartrate 50 milliGRAM(s) Oral every 6 hours  Nephro-ifeoma 1 Tablet(s) Oral daily  polyethylene glycol 3350 17 Gram(s) Oral daily  sacubitril 24 mG/valsartan 26 mG 1 Tablet(s) Oral two times a day  senna 2 Tablet(s) Oral at bedtime  sevelamer carbonate 800 milliGRAM(s) Oral three times a day with meals  sodium chloride 0.9% lock flush 10 milliLiter(s) IV Push every 1 hour PRN  tiotropium 18 MICROgram(s) Capsule 1 Capsule(s) Inhalation daily      Objective:  Vital Signs Last 24 Hrs  T(C): 36.7 (12 Oct 2021 11:31), Max: 36.8 (11 Oct 2021 16:58)  T(F): 98 (12 Oct 2021 11:31), Max: 98.2 (11 Oct 2021 16:58)  HR: 70 (12 Oct 2021 11:31) (70 - 84)  BP: 119/79 (12 Oct 2021 11:31) (118/82 - 159/81)  BP(mean): --  RR: 16 (12 Oct 2021 11:31) (16 - 18)  SpO2: 98% (12 Oct 2021 11:31) (96% - 98%))    PHYSICAL EXAM:  G: WDWN in no distress  HEENT:  NC, PERRL, sclerae anicteric, conjunctivae clear, EOMI.  Sinuses nontender, no nasal exudate.  No buccal or pharyngeal lesions, erythema or exudate  Neck:  Supple, no adenopathy  Lungs:  Clear to auscultation  Cor:  RRR, S1, S2, no murmur appreciated  Abd:  Symmetric, normoactive BS.  Soft, nontender, no masses, guarding or rebound.  Liver and spleen not enlarged  Extrem:  No cyanosis or edema  Skin:  No rashes.    LABS:                         12.9   9.50  )-----------( 259      ( 12 Oct 2021 08:17 )             39.7     10-12    141  |  105  |  14  ----------------------------<  130<H>  4.5   |  28  |  1.46<H>    Ca    9.6      12 Oct 2021 08:17  Phos  3.7     10-12  Mg     2.0     10-12    TPro  6.8  /  Alb  3.6  /  TBili  0.4  /  DBili  x   /  AST  19  /  ALT  16  /  AlkPhos  75  10-12                  RADIOLOGY, EKG & ADDITIONAL TESTS: Reviewed.            
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    INTERVAL:    10/8: NAEON. Afebrile. Awake/alert, comfortably. No pain or bleeding from nose. Otherwise no complaints.  10-09 Patient seen at bedside and discussed with attending. No complaints. WBC downtrending. Final sensitivities for OR cx resulted. Cr slightly uptrending  10-10 Patient seen at bedside and discussed with attending. No complaints. WBC downtrending and Cr downtrending. Final abx plan pending    Objective:  VITAL SIGNS:  ICU Vital Signs Last 24 Hrs  T(C): 37 (10 Oct 2021 09:04), Max: 37.1 (09 Oct 2021 17:33)  T(F): 98.6 (10 Oct 2021 09:04), Max: 98.7 (09 Oct 2021 17:33)  HR: 74 (10 Oct 2021 09:04) (68 - 74)  BP: 149/87 (10 Oct 2021 09:04) (142/88 - 156/83)  BP(mean): --  ABP: --  ABP(mean): --  RR: 16 (10 Oct 2021 09:04) (16 - 17)  SpO2: 99% (10 Oct 2021 09:04) (97% - 99%)      Physical Exam:      MEDICATIONS  (STANDING):  acetaminophen   Tablet .. 650 milliGRAM(s) Oral every 6 hours  dorzolamide 2%/timolol 0.5% Ophthalmic Solution 1 Drop(s) Both EYES two times a day  latanoprost 0.005% Ophthalmic Solution 1 Drop(s) Both EYES at bedtime  piperacillin/tazobactam IVPB..      piperacillin/tazobactam IVPB.. 4.5 Gram(s) IV Intermittent every 6 hours  sodium chloride 0.65% Nasal 2 Spray(s) Both Nostrils three times a day  vancomycin  IVPB 1500 milliGRAM(s) IV Intermittent every 24 hours    MEDICATIONS  (PRN):  diphenhydrAMINE 25 milliGRAM(s) Oral every 4 hours PRN Rash and/or Itching  famotidine    Tablet 20 milliGRAM(s) Oral every 12 hours PRN Dyspepsia  ondansetron Injectable 4 milliGRAM(s) IV Push every 6 hours PRN Nausea  oxycodone    5 mG/acetaminophen 325 mG 1 Tablet(s) Oral every 4 hours PRN Moderate Pain (4 - 6)      LABS                         13.1   8.71  )-----------( 215      ( 10 Oct 2021 08:28 )             40.1    10-10    141  |  104  |  16  ----------------------------<  100<H>  4.1   |  28  |  1.40<H>    Ca    9.6      10 Oct 2021 08:28  Phos  3.7     10-10  Mg     1.9     10-10    TPro  6.9  /  Alb  3.7  /  TBili  0.4  /  DBili  x   /  AST  18  /  ALT  15  /  AlkPhos  79  10-10    LIVER FUNCTIONS - ( 10 Oct 2021 08:28 )  Alb: 3.7 g/dL / Pro: 6.9 g/dL / ALK PHOS: 79 U/L / ALT: 15 U/L / AST: 18 U/L / GGT: x             PHYSICAL EXAM:    CONSTITUTIONAL: Well nourished, well developed, NON-DYSMORPHIC, and in no acute distress.    HEAD: normocephalic, atraumatic.  NOSE: No bleeding  RESPIRATORY: Respirations unlabored, no increased work of breathing with use of accessory muscles and retractions. No stridor.  CARDIAC: Warm extremities, no cyanosis.     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    - F/u ID reccs - vanc/zosyn  - Microbes sensitive to vanc zosyn  - F/u vanc trough  - Trend labs  - Would appreciate consideration for minimal amount of treatment for infection as necessary - patient has active skull base cancer requiring future treatment  - Regular diet  - SCDs  - Pain control  - Saline nasal sprays
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    INTERVAL:    10/8: NAEON. Afebrile. Awake/alert, comfortably. No pain or bleeding from nose. Otherwise no complaints.  10-09 Patient seen at bedside and discussed with attending. No complaints. WBC downtrending. Final sensitivities for OR cx resulted. Cr slightly uptrending  10-10 Patient seen at bedside and discussed with attending. No complaints. WBC downtrending and Cr downtrending. Final abx plan pending  10-11 NAEO. Doing well. Cr normalizing. Pending final ID reccs.    Objective:  VITAL SIGNS:  ICU Vital Signs Last 24 Hrs  T(C): 36.6 (11 Oct 2021 05:30), Max: 37 (10 Oct 2021 09:04)  T(F): 97.8 (11 Oct 2021 05:30), Max: 98.6 (10 Oct 2021 09:04)  HR: 77 (11 Oct 2021 05:30) (74 - 77)  BP: 122/74 (11 Oct 2021 05:30) (122/74 - 149/87)  BP(mean): --  ABP: --  ABP(mean): --  RR: 16 (11 Oct 2021 05:30) (16 - 17)  SpO2: 97% (11 Oct 2021 05:30) (96% - 99%)      MEDICATIONS  (STANDING):  acetaminophen   Tablet .. 650 milliGRAM(s) Oral every 6 hours  dorzolamide 2%/timolol 0.5% Ophthalmic Solution 1 Drop(s) Both EYES two times a day  latanoprost 0.005% Ophthalmic Solution 1 Drop(s) Both EYES at bedtime  piperacillin/tazobactam IVPB..      piperacillin/tazobactam IVPB.. 4.5 Gram(s) IV Intermittent every 6 hours  sodium chloride 0.65% Nasal 2 Spray(s) Both Nostrils three times a day  vancomycin  IVPB 1500 milliGRAM(s) IV Intermittent every 24 hours    MEDICATIONS  (PRN):  diphenhydrAMINE 25 milliGRAM(s) Oral every 4 hours PRN Rash and/or Itching  famotidine    Tablet 20 milliGRAM(s) Oral every 12 hours PRN Dyspepsia  ondansetron Injectable 4 milliGRAM(s) IV Push every 6 hours PRN Nausea  oxycodone    5 mG/acetaminophen 325 mG 1 Tablet(s) Oral every 4 hours PRN Moderate Pain (4 - 6)      LABS                         13.1   8.71  )-----------( 215      ( 10 Oct 2021 08:28 )             40.1    10-11    139  |  102  |  15  ----------------------------<  x   3.9   |  27  |  x     Ca    9.3      11 Oct 2021 08:02  Phos  3.7     10-11  Mg     2.0     10-11    TPro  6.9  /  Alb  3.7  /  TBili  0.4  /  DBili  x   /  AST  18  /  ALT  15  /  AlkPhos  79  10-10    LIVER FUNCTIONS - ( 10 Oct 2021 08:28 )  Alb: 3.7 g/dL / Pro: 6.9 g/dL / ALK PHOS: 79 U/L / ALT: 15 U/L / AST: 18 U/L / GGT: x           PHYSICAL EXAM:    CONSTITUTIONAL: Well nourished, well developed, NON-DYSMORPHIC, and in no acute distress.    HEAD: normocephalic, atraumatic.  NOSE: No bleeding  RESPIRATORY: Respirations unlabored, no increased work of breathing with use of accessory muscles and retractions. No stridor.  CARDIAC: Warm extremities, no cyanosis.     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    - F/u ID reccs - vanc/zosyn  - Microbes sensitive to vanc zosyn  - F/u vanc trough  - Trend labs  - Would appreciate consideration for minimal amount of treatment for infection as necessary - patient has active skull base cancer requiring future treatment  - Regular diet  - SCDs  - Pain control  - Saline nasal sprays
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    INTERVAL:    10/8: NAEON. Afebrile. Awake/alert, comfortably. No pain or bleeding from nose. Otherwise no complaints.    ICU Vital Signs Last 24 Hrs  T(C): 36.4 (08 Oct 2021 08:31), Max: 36.8 (07 Oct 2021 20:17)  T(F): 97.5 (08 Oct 2021 08:31), Max: 98.2 (07 Oct 2021 20:17)  HR: 77 (08 Oct 2021 08:31) (69 - 95)  BP: 128/92 (08 Oct 2021 08:31) (108/70 - 131/86)  BP(mean): --  ABP: --  ABP(mean): --  RR: 16 (08 Oct 2021 08:31) (16 - 18)  SpO2: 98% (08 Oct 2021 08:31) (95% - 98%)    PHYSICAL EXAM:    CONSTITUTIONAL: Well nourished, well developed, NON-DYSMORPHIC, and in no acute distress.    HEAD: normocephalic, atraumatic.  NOSE: No bleeding  RESPIRATORY: Respirations unlabored, no increased work of breathing with use of accessory muscles and retractions. No stridor.  CARDIAC: Warm extremities, no cyanosis.     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    - F/u ID reccs - vanc/zosyn  - Regular diet  - SCDs  - BCx  - Pain control  - Saline nasal sprays
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    INTERVAL:    10/8: NAEON. Afebrile. Awake/alert, comfortably. No pain or bleeding from nose. Otherwise no complaints.  10-09 Patient seen at bedside and discussed with attending. No complaints. WBC downtrending. Awaiting final sensitivities for OR cx.    Objective:  VITAL SIGNS:  ICU Vital Signs Last 24 Hrs  T(C): 36.2 (09 Oct 2021 10:51), Max: 36.9 (08 Oct 2021 21:05)  T(F): 97.2 (09 Oct 2021 10:51), Max: 98.4 (08 Oct 2021 21:05)  HR: 72 (09 Oct 2021 10:51) (69 - 77)  BP: 129/69 (09 Oct 2021 10:51) (110/71 - 130/82)  BP(mean): --  ABP: --  ABP(mean): --  RR: 16 (09 Oct 2021 10:51) (16 - 18)  SpO2: 96% (09 Oct 2021 10:51) (95% - 97%)    MEDICATIONS  (STANDING):  acetaminophen   Tablet .. 650 milliGRAM(s) Oral every 6 hours  dorzolamide 2%/timolol 0.5% Ophthalmic Solution 1 Drop(s) Both EYES two times a day  latanoprost 0.005% Ophthalmic Solution 1 Drop(s) Both EYES at bedtime  piperacillin/tazobactam IVPB..      piperacillin/tazobactam IVPB.. 4.5 Gram(s) IV Intermittent every 6 hours  sodium chloride 0.65% Nasal 2 Spray(s) Both Nostrils three times a day  vancomycin  IVPB      vancomycin  IVPB 1500 milliGRAM(s) IV Intermittent every 12 hours    MEDICATIONS  (PRN):  diphenhydrAMINE 25 milliGRAM(s) Oral every 4 hours PRN Rash and/or Itching  famotidine    Tablet 20 milliGRAM(s) Oral every 12 hours PRN Dyspepsia  ondansetron Injectable 4 milliGRAM(s) IV Push every 6 hours PRN Nausea  oxycodone    5 mG/acetaminophen 325 mG 1 Tablet(s) Oral every 4 hours PRN Moderate Pain (4 - 6)      LABS                         12.9   11.61 )-----------( 225      ( 09 Oct 2021 10:49 )             40.5    10-08    136  |  102  |  17  ----------------------------<  140<H>  4.4   |  22  |  1.36<H>    Ca    9.3      08 Oct 2021 07:42  Phos  2.8     10-08  Mg     1.9     10-08    TPro  7.1  /  Alb  3.4  /  TBili  0.3  /  DBili  0.2  /  AST  29  /  ALT  14  /  AlkPhos  90  10-08    LIVER FUNCTIONS - ( 08 Oct 2021 07:42 )  Alb: 3.4 g/dL / Pro: 7.1 g/dL / ALK PHOS: 90 U/L / ALT: 14 U/L / AST: 29 U/L / GGT: x               Culture - Blood (collected 10-08 @ 11:30)  Source: .Blood Blood  Preliminary Report (10-09 @ 00:00):    No growth at 12 hours    Culture - Blood (collected 10-08 @ 11:30)  Source: .Blood Blood  Preliminary Report (10-09 @ 00:00):    No growth at 12 hours      PHYSICAL EXAM:    CONSTITUTIONAL: Well nourished, well developed, NON-DYSMORPHIC, and in no acute distress.    HEAD: normocephalic, atraumatic.  NOSE: No bleeding  RESPIRATORY: Respirations unlabored, no increased work of breathing with use of accessory muscles and retractions. No stridor.  CARDIAC: Warm extremities, no cyanosis.     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    - F/u ID reccs - vanc/zosyn  - Trend labs  - Would appreciate consideration for minimal amount of treatment for infection as neccessary - patients has active skull base cancer requiring future treatment  - Regular diet  - Vacn trough  - SCDs  - BCx  - Pain control  - Saline nasal sprays
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    INTERVAL:    10/8: NAEON. Afebrile. Awake/alert, comfortably. No pain or bleeding from nose. Otherwise no complaints.  10-09 Patient seen at bedside and discussed with attending. No complaints. WBC downtrending. Final sensitivities for OR cx resulted. Cr slightly uptrending  10-10 Patient seen at bedside and discussed with attending. No complaints. WBC downtrending and Cr downtrending. Final abx plan pending  10-11 NAEO. Doing well. Cr normalizing. Pending final ID reccs.  10-12 NAEO. Doing well. Home pending PICC placement    Objective:  VITAL SIGNS:  ICU Vital Signs Last 24 Hrs  T(C): 36.7 (12 Oct 2021 05:05), Max: 36.8 (11 Oct 2021 16:58)  T(F): 98 (12 Oct 2021 05:05), Max: 98.2 (11 Oct 2021 16:58)  HR: 75 (12 Oct 2021 05:05) (73 - 84)  BP: 128/83 (12 Oct 2021 05:05) (118/82 - 159/81)  BP(mean): --  ABP: --  ABP(mean): --  RR: 16 (12 Oct 2021 05:05) (16 - 18)  SpO2: 97% (12 Oct 2021 05:05) (96% - 97%)      MEDICATIONS  (STANDING):  acetaminophen   Tablet .. 650 milliGRAM(s) Oral every 6 hours  dorzolamide 2%/timolol 0.5% Ophthalmic Solution 1 Drop(s) Both EYES two times a day  latanoprost 0.005% Ophthalmic Solution 1 Drop(s) Both EYES at bedtime  piperacillin/tazobactam IVPB..      piperacillin/tazobactam IVPB.. 4.5 Gram(s) IV Intermittent every 6 hours  sodium chloride 0.65% Nasal 2 Spray(s) Both Nostrils three times a day  vancomycin  IVPB 1250 milliGRAM(s) IV Intermittent every 12 hours    MEDICATIONS  (PRN):  diphenhydrAMINE 25 milliGRAM(s) Oral every 4 hours PRN Rash and/or Itching  famotidine    Tablet 20 milliGRAM(s) Oral every 12 hours PRN Dyspepsia  ondansetron Injectable 4 milliGRAM(s) IV Push every 6 hours PRN Nausea  oxycodone    5 mG/acetaminophen 325 mG 1 Tablet(s) Oral every 4 hours PRN Moderate Pain (4 - 6)      LABS                         12.9   9.50  )-----------( 259      ( 12 Oct 2021 08:17 )             39.7    10-12    141  |  105  |  14  ----------------------------<  130<H>  4.5   |  28  |  1.46<H>    Ca    9.6      12 Oct 2021 08:17  Phos  3.7     10-12  Mg     2.0     10-12    TPro  6.8  /  Alb  3.6  /  TBili  0.4  /  DBili  x   /  AST  19  /  ALT  16  /  AlkPhos  75  10-12    LIVER FUNCTIONS - ( 12 Oct 2021 08:17 )  Alb: 3.6 g/dL / Pro: 6.8 g/dL / ALK PHOS: 75 U/L / ALT: 16 U/L / AST: 19 U/L / GGT: x             PHYSICAL EXAM:    CONSTITUTIONAL: Well nourished, well developed, NON-DYSMORPHIC, and in no acute distress.    HEAD: normocephalic, atraumatic.  NOSE: No bleeding  RESPIRATORY: Respirations unlabored, no increased work of breathing with use of accessory muscles and retractions. No stridor.  CARDIAC: Warm extremities, no cyanosis.     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    - Appreciate final ID reccs  - Microbes sensitive to vanc zosyn  - Trend labs  - Would appreciate consideration for minimal amount of treatment for infection as necessary - patient has active skull base cancer requiring future treatment  - Regular diet  - SCDs  - Pain control  - Saline nasal sprays
INFECTIOUS DISEASES FOLLOW UP    SUBJECTIVE: Pt see w/  Lluvia. Reports feeling well. Denies f/c, HA, nasal discharge, SOB, abd pain and dysuria. Hive seen at IV site overnight resolved on its own      ROS:  negative except as above    Allergies    No Known Allergies    Intolerances      ANTIBIOTICS/RELEVANT:  antimicrobials    immunologic:  influenza  Vaccine (HIGH DOSE) 0.7 milliLiter(s) IntraMuscular once    OTHER:  acetaminophen   Tablet .. 650 milliGRAM(s) Oral every 6 hours PRN  atorvastatin 40 milliGRAM(s) Oral at bedtime  diltiazem    Tablet 60 milliGRAM(s) Oral every 6 hours  heparin  Infusion 1800 Unit(s)/Hr IV Continuous <Continuous>  insulin glargine Injectable (LANTUS) 6 Unit(s) SubCutaneous at bedtime  insulin lispro (ADMELOG) corrective regimen sliding scale   SubCutaneous Before meals and at bedtime  insulin lispro Injectable (ADMELOG) 3 Unit(s) SubCutaneous three times a day before meals  melatonin 5 milliGRAM(s) Oral at bedtime PRN  metoprolol tartrate 50 milliGRAM(s) Oral every 6 hours  Nephro-ifeoma 1 Tablet(s) Oral daily  polyethylene glycol 3350 17 Gram(s) Oral daily  sacubitril 24 mG/valsartan 26 mG 1 Tablet(s) Oral two times a day  senna 2 Tablet(s) Oral at bedtime  sevelamer carbonate 800 milliGRAM(s) Oral three times a day with meals  sodium chloride 0.9% lock flush 10 milliLiter(s) IV Push every 1 hour PRN  tiotropium 18 MICROgram(s) Capsule 1 Capsule(s) Inhalation daily      Objective:  Vital Signs Last 24 Hrs  T(C): 36.4 (08 Oct 2021 08:31), Max: 36.8 (07 Oct 2021 20:17)  T(F): 97.5 (08 Oct 2021 08:31), Max: 98.2 (07 Oct 2021 20:17)  HR: 77 (08 Oct 2021 08:31) (69 - 95)  BP: 128/92 (08 Oct 2021 08:31) (108/70 - 131/86)  BP(mean): --  RR: 16 (08 Oct 2021 08:31) (16 - 18)  SpO2: 98% (08 Oct 2021 08:31) (95% - 98%)    PHYSICAL EXAM:  G: WDWN in no distress  HEENT:  NC, PERRL, sclerae anicteric, conjunctivae clear, EOMI.  Sinuses nontender, no nasal exudate.  No buccal or pharyngeal lesions, erythema or exudate  Neck:  Supple, no adenopathy  Lungs:  Clear to auscultation  Cor:  RRR, S1, S2, no murmur appreciated  Abd:  Symmetric, normoactive BS.  Soft, nontender, no masses, guarding or rebound.  Liver and spleen not enlarged  Extrem:  No cyanosis or edema  Skin:  No rashes.     LABS:                         13.1   18.27 )-----------( 243      ( 08 Oct 2021 07:42 )             40.1     10-08    136  |  102  |  17  ----------------------------<  140<H>  4.4   |  22  |  1.36<H>    Ca    9.3      08 Oct 2021 07:42  Phos  2.8     10-08  Mg     1.9     10-08                   
INFECTIOUS DISEASES FOLLOW UP    SUBJECTIVE: Pt see w/ . Reports feeling well. Denies f/c, HA, nasal discharge, SOB, abd pain and dysuria.      ROS:  negative except as above    Allergies    No Known Allergies    Intolerances      ANTIBIOTICS/RELEVANT:  antimicrobials    immunologic:  influenza  Vaccine (HIGH DOSE) 0.7 milliLiter(s) IntraMuscular once    OTHER:  acetaminophen   Tablet .. 650 milliGRAM(s) Oral every 6 hours PRN  atorvastatin 40 milliGRAM(s) Oral at bedtime  diltiazem    Tablet 60 milliGRAM(s) Oral every 6 hours  heparin  Infusion 1800 Unit(s)/Hr IV Continuous <Continuous>  insulin glargine Injectable (LANTUS) 6 Unit(s) SubCutaneous at bedtime  insulin lispro (ADMELOG) corrective regimen sliding scale   SubCutaneous Before meals and at bedtime  insulin lispro Injectable (ADMELOG) 3 Unit(s) SubCutaneous three times a day before meals  melatonin 5 milliGRAM(s) Oral at bedtime PRN  metoprolol tartrate 50 milliGRAM(s) Oral every 6 hours  Nephro-ifeoma 1 Tablet(s) Oral daily  polyethylene glycol 3350 17 Gram(s) Oral daily  sacubitril 24 mG/valsartan 26 mG 1 Tablet(s) Oral two times a day  senna 2 Tablet(s) Oral at bedtime  sevelamer carbonate 800 milliGRAM(s) Oral three times a day with meals  sodium chloride 0.9% lock flush 10 milliLiter(s) IV Push every 1 hour PRN  tiotropium 18 MICROgram(s) Capsule 1 Capsule(s) Inhalation daily      Objective:  Vital Signs Last 24 Hrs  T(C): 36.8 (11 Oct 2021 16:58), Max: 36.8 (10 Oct 2021 19:55)  T(F): 98.2 (11 Oct 2021 16:58), Max: 98.2 (10 Oct 2021 19:55)  HR: 84 (11 Oct 2021 16:58) (70 - 84)  BP: 159/81 (11 Oct 2021 16:58) (122/74 - 159/81)  BP(mean): --  RR: 18 (11 Oct 2021 16:58) (16 - 18)  SpO2: 96% (11 Oct 2021 16:58) (96% - 98%)    PHYSICAL EXAM:  G: WDWN in no distress  HEENT:  NC, PERRL, sclerae anicteric, conjunctivae clear, EOMI.  Sinuses nontender, no nasal exudate.  No buccal or pharyngeal lesions, erythema or exudate  Neck:  Supple, no adenopathy  Lungs:  Clear to auscultation  Cor:  RRR, S1, S2, no murmur appreciated  Abd:  Symmetric, normoactive BS.  Soft, nontender, no masses, guarding or rebound.  Liver and spleen not enlarged  Extrem:  No cyanosis or edema  Skin:  No rashes.    LABS:                         12.5   9.35  )-----------( 231      ( 11 Oct 2021 08:02 )             38.9     10-11    139  |  102  |  15  ----------------------------<  100<H>  3.9   |  27  |  1.40<H>    Ca    9.3      11 Oct 2021 08:02  Phos  3.7     10-11  Mg     2.0     10-11    TPro  6.9  /  Alb  3.5  /  TBili  0.4  /  DBili  x   /  AST  19  /  ALT  15  /  AlkPhos  75  10-11                  RADIOLOGY, EKG & ADDITIONAL TESTS: Reviewed.         
INTERVAL HPI/OVERNIGHT EVENTS: BETHEL. No rhinorrhea.     CONSTITUTIONAL:  Negative fever or chills, feels well, good appetite  EYES:  Negative  blurry vision or double vision  CARDIOVASCULAR:  Negative for chest pain or palpitations  RESPIRATORY:  Negative for cough, wheezing, or SOB   GASTROINTESTINAL:  Negative for nausea, vomiting, diarrhea, constipation, or abdominal pain  GENITOURINARY:  Negative frequency, urgency or dysuria  NEUROLOGIC:  No headache, confusion, dizziness, lightheadedness      ANTIBIOTICS/RELEVANT:    MEDICATIONS  (STANDING):  acetaminophen   Tablet .. 650 milliGRAM(s) Oral every 6 hours  dorzolamide 2%/timolol 0.5% Ophthalmic Solution 1 Drop(s) Both EYES two times a day  latanoprost 0.005% Ophthalmic Solution 1 Drop(s) Both EYES at bedtime  piperacillin/tazobactam IVPB..      piperacillin/tazobactam IVPB.. 4.5 Gram(s) IV Intermittent every 6 hours  sodium chloride 0.65% Nasal 2 Spray(s) Both Nostrils three times a day    MEDICATIONS  (PRN):  diphenhydrAMINE 25 milliGRAM(s) Oral every 4 hours PRN Rash and/or Itching  famotidine    Tablet 20 milliGRAM(s) Oral every 12 hours PRN Dyspepsia  ondansetron Injectable 4 milliGRAM(s) IV Push every 6 hours PRN Nausea  oxycodone    5 mG/acetaminophen 325 mG 1 Tablet(s) Oral every 4 hours PRN Moderate Pain (4 - 6)        Vital Signs Last 24 Hrs  T(C): 36.2 (09 Oct 2021 10:51), Max: 36.9 (08 Oct 2021 21:05)  T(F): 97.2 (09 Oct 2021 10:51), Max: 98.4 (08 Oct 2021 21:05)  HR: 72 (09 Oct 2021 10:51) (69 - 77)  BP: 129/69 (09 Oct 2021 10:51) (110/71 - 130/82)  BP(mean): --  RR: 16 (09 Oct 2021 10:51) (16 - 18)  SpO2: 96% (09 Oct 2021 10:51) (95% - 97%)    PHYSICAL EXAM:  Constitutional:Well-developed, well nourished  Eyes:BOB, EOMI  Ear/Nose/Throat: no oral lesion, no sinus tenderness on percussion	  Neck:no JVD, no lymphadenopathy, supple  Respiratory: CTA wolfgang  Cardiovascular: S1S2 RRR, no murmurs  Gastrointestinal:soft, (+) BS, no HSM  Extremities:no e/e/c  Vascular: DP Pulse:	right normal; left normal      LABS:                        12.9   11.61 )-----------( 225      ( 09 Oct 2021 10:49 )             40.5     10-09    142  |  103  |  20  ----------------------------<  153<H>  4.0   |  29  |  1.49<H>    Ca    9.6      09 Oct 2021 10:49  Phos  3.0     10-09  Mg     2.1     10-09    TPro  7.0  /  Alb  3.8  /  TBili  0.3  /  DBili  x   /  AST  17  /  ALT  15  /  AlkPhos  83  10-09          MICROBIOLOGY: Culture - Surgical Swab (10.07.21 @ 11:40)    -  Vancomycin: S 1.5    -  Linezolid: S 2    -  Oxacillin: R >2    -  RIF- Rifampin: S <=1 Should not be used as monotherapy    -  Tetra/Doxy: S <=1    -  Trimethoprim/Sulfamethoxazole: S <=0.5/9.5    Gram Stain:   No organisms seen  Few WBC's    -  Cefazolin: R <=4    -  Clindamycin: S <=0.25    -  Daptomycin: S 1    -  Erythromycin: R >4    Specimen Source: .Surgical Swab (2) Right Sphenoid Sinus    Culture Results:   Few Methicillin Resistant Staphylococcus aureus  Result called to and read back by_ ELIZA Reeder RN  10/08/2021 13:40:00  Rare Pseudomonas aeruginosa See previous culture for susceptibility  Rare Lelliottia amnigena (also known as Enterobacter amnigenus) See  previous culture for susceptibility    Organism Identification: Methicillin resistant Staphylococcus aureus  Methicillin resistant Staphylococcus aureus    Organism: Methicillin resistant Staphylococcus aureus    Organism: Methicillin resistant Staphylococcus aureus    Method Type: RADAMES    Method Type: ETEST        RADIOLOGY & ADDITIONAL STUDIES: reviewed

## 2021-10-12 NOTE — DISCHARGE NOTE PROVIDER - NSDCMRMEDTOKEN_GEN_ALL_CORE_FT
amLODIPine 5 mg oral tablet: 1 tab(s) orally once a day  dorzolamide-timolol 2.23%-0.68% ophthalmic solution: 1 drop(s) to each affected eye 2 times a day  Heparin 10 units/ mL solution, 3 mL, intravenously, two times per day: 3 milliliter(s) intravenous 2 times a day   latanoprost 0.005% ophthalmic solution: 1 drop(s) to each affected eye once a day (in the evening)  losartan 100 mg oral tablet: 1 tab(s) orally once a day  Normal Saline 0.9% Flush, 10 mL, via PICC, pre- and post- each infusion: Normal Saline 0.9% Flush, 10 mL, via PICC, pre- and post- each infusion  omeprazole 40 mg oral delayed release capsule: 1 cap(s) orally once a day  piperacillin-tazobactam: 18 gram(s) intravenous once a day  timolol maleate 0.5% ophthalmic solution: 1 drop(s) to each affected eye once a day (at bedtime)  vancomycin 1.25 g/150 mL-NaCl 0.9% intravenous solution: 1.25 gram(s) intravenously 2 times a day, every 12 hours, end November 17th  Weekly Labs: Weekly CBC, CMP, ESR, CRP, Weekly on Fridays until 11/17, Fax Results to: Attention Dr. Mcgraw, Fax: [phone]:    amLODIPine 5 mg oral tablet: 1 tab(s) orally once a day  dorzolamide-timolol 2.23%-0.68% ophthalmic solution: 1 drop(s) to each affected eye 2 times a day  Heparin 10 units/ mL solution, 3 mL, intravenously, two times per day: 3 milliliter(s) intravenous 2 times a day   latanoprost 0.005% ophthalmic solution: 1 drop(s) to each affected eye once a day (in the evening)  losartan 100 mg oral tablet: 1 tab(s) orally once a day  Normal Saline 0.9% Flush, 10 mL, via PICC, pre- and post- each infusion: Normal Saline 0.9% Flush, 10 mL, via PICC, pre- and post- each infusion  omeprazole 40 mg oral delayed release capsule: 1 cap(s) orally once a day  piperacillin-tazobactam: 18 gram(s) intravenous once a day  sodium chloride 0.65% nasal spray: 2 application nasal 2 times a day   timolol maleate 0.5% ophthalmic solution: 1 drop(s) to each affected eye once a day (at bedtime)  vancomycin 1.25 g/150 mL-NaCl 0.9% intravenous solution: 1.25 gram(s) intravenously 2 times a day, every 12 hours, end November 17th  Weekly Labs: Weekly CBC, CMP, ESR, CRP, Weekly on Fridays until 11/17, Fax Results to: Erick Mcgraw, Fax: [phone]:

## 2021-10-12 NOTE — PROGRESS NOTE ADULT - ATTENDING COMMENTS
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  D/Santosh today by primary team.  Will arrange for outpatient vancomycin trough and BMP on new dose of vancomycin.  Will arrange outpatient f/u.  Please recall if questions.
As above.  OR cultures with presumptive MRSA, Pseudomonas and Lelleottia amnigena (formerly in Enterobacter genus), will f/u for additional growth, as well as susceptibilities of these isolates, continue vanc and pip-tazo for now.  Will follow with you - team 1.  Dr. Wheatley will cover from tonight through 10/10, I will resume care 10/11.
Would plan for 6 week course of vanc and pip-tazo.  Had vanc trough this morning at 10:48 that was 48.4, dose in EMR as having been given at 10:56.  Discussed with ENT resident - was told that vanc level was after dose.  He had gotten 1.5 g this morning, had recommended changing to 1.25 g IV q12h based on earlier levels.  Would repeat level at 9 pm - if >=20, hold dose and repeat level in the morning, if <20, would start 1.25 g IV q12h.  Will follow with you - team 1.

## 2021-10-12 NOTE — DISCHARGE NOTE NURSING/CASE MANAGEMENT/SOCIAL WORK - PATIENT PORTAL LINK FT
You can access the FollowMyHealth Patient Portal offered by [facility] by registering at the following website: [email or web address]. By joining [employer]’s FollowMyHealth portal, you will also be able to view your health information using other applications (apps) compatible with our system.

## 2021-10-12 NOTE — PROGRESS NOTE ADULT - ASSESSMENT
61M with recurrent adenoid cystic CA found to have active infection of nasal cavity and nasopharynx at time of attempted resection.  Prior cultures from 8/2021 with MSSA, Strep pyogenes, Strep mitis/oralis and Pseudomonas.  He was treated with clinda - the MSSA, Strep pyogenes and Strep mitis/oralis were susceptible.  Clinda has no activity against Pseudomonas.  Found to have sinusitis with MRSA, Enterobacter, Pseudomonas    Recs:   - PICC placed on 10/12  - spoke team pt has been dc'ed on vancomycin 1.25 q12hrs X 6wks ( Day 1=10/7; Last Day=11/17) based on 10/11 10pm Vanc level of 20.6  - dc'ed on pip-tazo 18g IV continuos infusion for 24hrs X 6 wks  (Day 1=10/7; Last day =11/17)  - CBC, CMP, ESR, CRP weekly, fax to Dr. Mcgraw. Pt will f/u w/ Dr. Mcgraw as outpt, we will arrange      Recommendations discussed with primary team.     See attending attestation for final recs         61M with recurrent adenoid cystic CA found to have active infection of nasal cavity and nasopharynx at time of attempted resection.  Prior cultures from 8/2021 with MSSA, Strep pyogenes, Strep mitis/oralis and Pseudomonas.  He was treated with clinda - the MSSA, Strep pyogenes and Strep mitis/oralis were susceptible.  Clinda has no activity against Pseudomonas.  Found to have sinusitis with MRSA, Enterobacter, Pseudomonas    Recs:   - PICC placed on 10/12  - spoke team pt has been dc'ed on vancomycin 1.25 q12hrs X 6wks ( Day 1=10/7; Last Day=11/17) based on 10/11 10pm Vanc level of 20.6  - dc'ed on pip-tazo 18g IV continuos infusion for 24hrs X 6 wks  (Day 1=10/7; Last day =11/17)  - CBC, CMP, ESR, CRP, vancomycin trough weekly, fax to Dr. Mcgraw. Pt will f/u w/ Dr. Mcgraw as outpt, we will arrange      Recommendations discussed with primary team.     See attending attestation for final recs

## 2021-10-12 NOTE — DISCHARGE NOTE PROVIDER - CARE PROVIDER_API CALL
Wei Stewart)  Otolaryngology  [address] [place] [address]  Phone: [phone]  Fax: [phone]  Established Patient  Follow Up Time:     Gita Mcgraw)  Infectious Disease; Internal Medicine  [address]  [place] [address]  Phone: [phone]  Fax: [phone]  Established Patient  Follow Up Time:

## 2021-10-12 NOTE — DISCHARGE NOTE PROVIDER - PROVIDER TOKENS
PROVIDER:[TOKEN:[[id number]],ESTABLISHEDPATIENT:[T]],PROVIDER:[TOKEN:[[id number]],ESTABLISHEDPATIENT:[T]]

## 2021-10-13 LAB
CULTURE RESULTS: SIGNIFICANT CHANGE UP
CULTURE RESULTS: SIGNIFICANT CHANGE UP
SPECIMEN SOURCE: SIGNIFICANT CHANGE UP
SPECIMEN SOURCE: SIGNIFICANT CHANGE UP
SURGICAL PATHOLOGY STUDY: SIGNIFICANT CHANGE UP

## 2021-10-17 DIAGNOSIS — I10 ESSENTIAL (PRIMARY) HYPERTENSION: ICD-10-CM

## 2021-10-17 DIAGNOSIS — E10.39 TYPE 1 DIABETES MELLITUS WITH OTHER DIABETIC OPHTHALMIC COMPLICATION: ICD-10-CM

## 2021-10-17 DIAGNOSIS — H42 GLAUCOMA IN DISEASES CLASSIFIED ELSEWHERE: ICD-10-CM

## 2021-10-17 DIAGNOSIS — K21.9 GASTRO-ESOPHAGEAL REFLUX DISEASE WITHOUT ESOPHAGITIS: ICD-10-CM

## 2021-10-17 DIAGNOSIS — C30.0 MALIGNANT NEOPLASM OF NASAL CAVITY: ICD-10-CM

## 2021-10-17 DIAGNOSIS — Z83.3 FAMILY HISTORY OF DIABETES MELLITUS: ICD-10-CM

## 2021-10-17 DIAGNOSIS — Z85.841 PERSONAL HISTORY OF MALIGNANT NEOPLASM OF BRAIN: ICD-10-CM

## 2021-10-17 DIAGNOSIS — Z88.0 ALLERGY STATUS TO PENICILLIN: ICD-10-CM

## 2021-10-21 DIAGNOSIS — B95.62 METHICILLIN RESISTANT STAPHYLOCOCCUS AUREUS INFECTION AS THE CAUSE OF DISEASES CLASSIFIED ELSEWHERE: ICD-10-CM

## 2021-10-21 DIAGNOSIS — C11.1 MALIGNANT NEOPLASM OF POSTERIOR WALL OF NASOPHARYNX: ICD-10-CM

## 2021-10-21 DIAGNOSIS — J03.90 ACUTE TONSILLITIS, UNSPECIFIED: ICD-10-CM

## 2021-10-21 DIAGNOSIS — N17.9 ACUTE KIDNEY FAILURE, UNSPECIFIED: ICD-10-CM

## 2021-10-21 DIAGNOSIS — E11.9 TYPE 2 DIABETES MELLITUS WITHOUT COMPLICATIONS: ICD-10-CM

## 2021-10-21 DIAGNOSIS — Z88.0 ALLERGY STATUS TO PENICILLIN: ICD-10-CM

## 2021-10-21 DIAGNOSIS — Z53.09 PROCEDURE AND TREATMENT NOT CARRIED OUT BECAUSE OF OTHER CONTRAINDICATION: ICD-10-CM

## 2021-10-21 DIAGNOSIS — I96 GANGRENE, NOT ELSEWHERE CLASSIFIED: ICD-10-CM

## 2021-10-21 DIAGNOSIS — E11.52 TYPE 2 DIABETES MELLITUS WITH DIABETIC PERIPHERAL ANGIOPATHY WITH GANGRENE: ICD-10-CM

## 2021-10-21 DIAGNOSIS — J01.90 ACUTE SINUSITIS, UNSPECIFIED: ICD-10-CM

## 2021-10-21 DIAGNOSIS — Z98.890 OTHER SPECIFIED POSTPROCEDURAL STATES: ICD-10-CM

## 2021-10-21 DIAGNOSIS — K21.9 GASTRO-ESOPHAGEAL REFLUX DISEASE WITHOUT ESOPHAGITIS: ICD-10-CM

## 2021-10-21 DIAGNOSIS — B96.5 PSEUDOMONAS (AERUGINOSA) (MALLEI) (PSEUDOMALLEI) AS THE CAUSE OF DISEASES CLASSIFIED ELSEWHERE: ICD-10-CM

## 2021-10-21 DIAGNOSIS — Z90.49 ACQUIRED ABSENCE OF OTHER SPECIFIED PARTS OF DIGESTIVE TRACT: ICD-10-CM

## 2021-10-27 ENCOUNTER — NON-APPOINTMENT (OUTPATIENT)
Age: 61
End: 2021-10-27

## 2021-10-28 PROCEDURE — 83735 ASSAY OF MAGNESIUM: CPT

## 2021-10-28 PROCEDURE — 87186 SC STD MICRODIL/AGAR DIL: CPT

## 2021-10-28 PROCEDURE — 88311 DECALCIFY TISSUE: CPT

## 2021-10-28 PROCEDURE — 87075 CULTR BACTERIA EXCEPT BLOOD: CPT

## 2021-10-28 PROCEDURE — 87181 SC STD AGAR DILUTION PER AGT: CPT

## 2021-10-28 PROCEDURE — 36415 COLL VENOUS BLD VENIPUNCTURE: CPT

## 2021-10-28 PROCEDURE — 86901 BLOOD TYPING SEROLOGIC RH(D): CPT

## 2021-10-28 PROCEDURE — 82962 GLUCOSE BLOOD TEST: CPT

## 2021-10-28 PROCEDURE — 88331 PATH CONSLTJ SURG 1 BLK 1SPC: CPT

## 2021-10-28 PROCEDURE — 88304 TISSUE EXAM BY PATHOLOGIST: CPT

## 2021-10-28 PROCEDURE — 87070 CULTURE OTHR SPECIMN AEROBIC: CPT

## 2021-10-28 PROCEDURE — 80048 BASIC METABOLIC PNL TOTAL CA: CPT

## 2021-10-28 PROCEDURE — 80076 HEPATIC FUNCTION PANEL: CPT

## 2021-10-28 PROCEDURE — 85027 COMPLETE CBC AUTOMATED: CPT

## 2021-10-28 PROCEDURE — 80202 ASSAY OF VANCOMYCIN: CPT

## 2021-10-28 PROCEDURE — 84100 ASSAY OF PHOSPHORUS: CPT

## 2021-10-28 PROCEDURE — 86769 SARS-COV-2 COVID-19 ANTIBODY: CPT

## 2021-10-28 PROCEDURE — 80053 COMPREHEN METABOLIC PANEL: CPT

## 2021-10-28 PROCEDURE — 86900 BLOOD TYPING SEROLOGIC ABO: CPT

## 2021-10-28 PROCEDURE — 86850 RBC ANTIBODY SCREEN: CPT

## 2021-10-28 PROCEDURE — 87102 FUNGUS ISOLATION CULTURE: CPT

## 2021-10-28 PROCEDURE — C9399: CPT

## 2021-10-28 PROCEDURE — 88305 TISSUE EXAM BY PATHOLOGIST: CPT

## 2021-10-28 PROCEDURE — 87040 BLOOD CULTURE FOR BACTERIA: CPT

## 2021-10-28 PROCEDURE — 85025 COMPLETE CBC W/AUTO DIFF WBC: CPT

## 2021-10-28 PROCEDURE — 88300 SURGICAL PATH GROSS: CPT

## 2021-10-28 PROCEDURE — 86803 HEPATITIS C AB TEST: CPT

## 2021-10-28 PROCEDURE — 36573 INSJ PICC RS&I 5 YR+: CPT

## 2021-11-06 LAB
CULTURE RESULTS: SIGNIFICANT CHANGE UP
SPECIMEN SOURCE: SIGNIFICANT CHANGE UP

## 2021-11-07 ENCOUNTER — APPOINTMENT (OUTPATIENT)
Dept: MRI IMAGING | Facility: HOSPITAL | Age: 61
End: 2021-11-07
Payer: MEDICARE

## 2021-11-07 ENCOUNTER — RESULT REVIEW (OUTPATIENT)
Age: 61
End: 2021-11-07

## 2021-11-07 ENCOUNTER — OUTPATIENT (OUTPATIENT)
Dept: OUTPATIENT SERVICES | Facility: HOSPITAL | Age: 61
LOS: 1 days | End: 2021-11-07
Payer: MEDICARE

## 2021-11-07 DIAGNOSIS — Z90.49 ACQUIRED ABSENCE OF OTHER SPECIFIED PARTS OF DIGESTIVE TRACT: Chronic | ICD-10-CM

## 2021-11-07 DIAGNOSIS — Z98.890 OTHER SPECIFIED POSTPROCEDURAL STATES: Chronic | ICD-10-CM

## 2021-11-07 DIAGNOSIS — H26.9 UNSPECIFIED CATARACT: Chronic | ICD-10-CM

## 2021-11-07 PROCEDURE — 70543 MRI ORBT/FAC/NCK W/O &W/DYE: CPT | Mod: 26,MH

## 2021-11-07 PROCEDURE — A9585: CPT

## 2021-11-07 PROCEDURE — 70543 MRI ORBT/FAC/NCK W/O &W/DYE: CPT

## 2021-11-17 ENCOUNTER — NON-APPOINTMENT (OUTPATIENT)
Age: 61
End: 2021-11-17

## 2021-11-18 ENCOUNTER — NON-APPOINTMENT (OUTPATIENT)
Age: 61
End: 2021-11-18

## 2021-11-19 ENCOUNTER — APPOINTMENT (OUTPATIENT)
Dept: INFECTIOUS DISEASE | Facility: CLINIC | Age: 61
End: 2021-11-19
Payer: MEDICARE

## 2021-11-19 VITALS
OXYGEN SATURATION: 98 % | BODY MASS INDEX: 35.13 KG/M2 | HEIGHT: 70 IN | HEART RATE: 84 BPM | WEIGHT: 245.38 LBS | SYSTOLIC BLOOD PRESSURE: 164 MMHG | TEMPERATURE: 97.9 F | DIASTOLIC BLOOD PRESSURE: 115 MMHG

## 2021-11-19 DIAGNOSIS — J32.0 CHRONIC MAXILLARY SINUSITIS: ICD-10-CM

## 2021-11-19 DIAGNOSIS — J32.3 CHRONIC SPHENOIDAL SINUSITIS: ICD-10-CM

## 2021-11-19 DIAGNOSIS — C80.1 MALIGNANT (PRIMARY) NEOPLASM, UNSPECIFIED: ICD-10-CM

## 2021-11-19 DIAGNOSIS — J32.2 CHRONIC ETHMOIDAL SINUSITIS: ICD-10-CM

## 2021-11-19 PROCEDURE — 99215 OFFICE O/P EST HI 40 MIN: CPT

## 2021-11-19 RX ORDER — PIPERACILLIN SODIUM AND TAZOBACTAM SODIUM 3; .375 G/15ML; G/15ML
3.375 (3-0.375) INJECTION, POWDER, FOR SOLUTION INTRAVENOUS
Refills: 0 | Status: DISCONTINUED | COMMUNITY
End: 2021-11-19

## 2021-11-19 NOTE — ASSESSMENT
[FreeTextEntry1] : 60 yo M with recurrent adenoid cystic CA found to have active infection of nasal cavity and nasopharynx at time of attempted resection.  Found to have sinusitis with MRSA, Enterobacter, Pseudomonas. He has completed 6 weeks of pip-tazo and vancomycin. Recent MRI shows worsening paranasal sinusitis, which is worrisome as current antibiotics should treat organisms from surgery on 10/7.  Plan for additional surgery is unclear.  Vancomycin level on 11/16 was supra-therapeutic but he had received part of his infusion prior to phlebotomy - is unreliable. His renal function is stable.  His wife is very concerned about fatigue, anorexia and diarrhea, which likely are related to antibiotics.  He has had no fever, signs of systemic infection throughout.\par Plan:\par - Hold antibiotics, maintain PICC for now.  His wife was instructed to flush daily.\par - Will determine plan and f/u after discussing with Dr. Stewart - message left.\par Encouraged to call if fever, chills, worsening symptoms.

## 2021-11-19 NOTE — DATA REVIEWED
[FreeTextEntry1] : 11/16:  WBC 4.4  H/H 14.2/40.2  plts 183K  BUN 8, Cr 1.44  CMP otherwise WNL  ESR 30 (<=30) Vancomycin level 25.5

## 2021-11-19 NOTE — HISTORY OF PRESENT ILLNESS
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 He presented with dizziness, vomiting and blurriness of L eye vision and was found to have sellar lesion with extension into sphenoid structures, cavernous sinus and prepontine region.   Endonasal biopsy 5/2018 showed adenoid cystic CA (salivary origin). He was treated with RT (courses completed 6/2018 and 8/2018). In 10/2018, he underwent FESS of bilateral sphenoid, ethmoid and maxillary sinuses.  He was found to have recurrent tumor on imaging on 7/8/21.  On 8/24, he underwent endoscopic endonasal biopsy.  Purulent drainage was noted bilaterally stemming from the maxillary sinus.  Bilateral maxillary antrostomies were performed and cultures were sent.  Cultures grew MSSA, Strep pyogenes and Pseudomonas.  He was started on clinda.  PET CT showed recurrence of tumor and cervical LNs that were possibly inflammatory.  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  He was admitted 10/7 for surgical removal of tumor.  In the OR, active infection of the nasal cavity and nasopharynx with necrosis of nasal septum was seen.  Debridement of the nasal septum and bilateral middle and inferior turbinates was done.  Cultures were sent then the case was aborted.  He was started on clinda IV.  ID was consulted, clinda was discontinued and vanc/pip-tazo started. OR cultures grew few Pseudomonas; few MRSA, Lelliottia amnigena (Enterobacter). Course c/b ROBYN. He was discharged on vanc and pip-tazo x 6 weeks (10/7 – 11/17).  He is feeling weak and fatigued. Has decreased appetite. He is having 3-5 watery to soft bowel movements a day. His L eye vision is unchanged. When d/daphnie from [facility], he was having thick yellow drainage when he did sinus irrigation - currently is clear.  MRI done 11/7 showed worsening paranasal sinus disease, now involving ethmoid and sphenoid sinuses. He has not seen Dr. Stewart since hospital d/c.

## 2021-11-19 NOTE — PHYSICAL EXAM
[General Appearance - Alert] : alert [General Appearance - In No Acute Distress] : in no acute distress [Sclera] : the sclera and conjunctiva were normal [Outer Ear] : the ears and nose were normal in appearance [Examination Of The Oral Cavity] : the lips and gums were normal [Oropharynx] : the oropharynx was normal with no thrush [Auscultation Breath Sounds / Voice Sounds] : lungs were clear to auscultation bilaterally [Heart Rate And Rhythm] : heart rate was normal and rhythm regular [Heart Sounds] : normal S1 and S2 [Edema] : there was no peripheral edema [Bowel Sounds] : normal bowel sounds [Abdomen Soft] : soft [Costovertebral Angle Tenderness] : no CVA tenderness [Musculoskeletal - Swelling] : no joint swelling [] : no rash [FreeTextEntry1] : LUE PICC exit site without erythema or edema

## 2021-11-19 NOTE — REVIEW OF SYSTEMS
[Feeling Sick] : feeling sick [Feeling Tired] : feeling tired [As Noted in HPI] : as noted in HPI [Diarrhea] : diarrhea [Fever] : no fever [Chills] : no chills [Normal Appetite] : appetite not normal  [Recent Weight Loss (___ Lbs)] : no recent weight loss [Chest Pain] : no chest pain [Shortness Of Breath] : no shortness of breath [Cough] : no cough [Abdominal Pain] : no abdominal pain [Vomiting] : no vomiting [Dysuria] : no dysuria [Joint Pain] : no joint pain [Joint Swelling] : no joint swelling [Skin Lesions] : no skin lesions [Easy Bleeding] : no tendency for easy bleeding [Easy Bruising] : no tendency for easy bruising [FreeTextEntry7] : He is having 2-3 watery BMs daily and feels bloated.

## 2021-11-19 NOTE — REASON FOR VISIT
[Post Hospitalization] : a post hospitalization visit [[employer] ] : provided by [employer]   [Interpreters_IDNumber] : [id number] [Interpreters_FullName] : Lee [[id number]] : Irish

## 2021-12-10 ENCOUNTER — NON-APPOINTMENT (OUTPATIENT)
Age: 61
End: 2021-12-10

## 2021-12-21 ENCOUNTER — OUTPATIENT (OUTPATIENT)
Dept: OUTPATIENT SERVICES | Facility: HOSPITAL | Age: 61
LOS: 1 days | End: 2021-12-21
Payer: MEDICARE

## 2021-12-21 ENCOUNTER — APPOINTMENT (OUTPATIENT)
Dept: INTERVENTIONAL RADIOLOGY/VASCULAR | Facility: HOSPITAL | Age: 61
End: 2021-12-21

## 2021-12-21 DIAGNOSIS — H26.9 UNSPECIFIED CATARACT: Chronic | ICD-10-CM

## 2021-12-21 DIAGNOSIS — C08.9 MALIGNANT NEOPLASM OF MAJOR SALIVARY GLAND, UNSPECIFIED: ICD-10-CM

## 2021-12-21 DIAGNOSIS — Z45.2 ENCOUNTER FOR ADJUSTMENT AND MANAGEMENT OF VASCULAR ACCESS DEVICE: ICD-10-CM

## 2021-12-21 DIAGNOSIS — Z90.49 ACQUIRED ABSENCE OF OTHER SPECIFIED PARTS OF DIGESTIVE TRACT: Chronic | ICD-10-CM

## 2021-12-21 DIAGNOSIS — Z98.890 OTHER SPECIFIED POSTPROCEDURAL STATES: Chronic | ICD-10-CM

## 2021-12-21 PROCEDURE — C1769: CPT

## 2021-12-21 PROCEDURE — 76937 US GUIDE VASCULAR ACCESS: CPT

## 2021-12-21 PROCEDURE — 99152 MOD SED SAME PHYS/QHP 5/>YRS: CPT

## 2021-12-21 PROCEDURE — 76937 US GUIDE VASCULAR ACCESS: CPT | Mod: 26

## 2021-12-21 PROCEDURE — 36561 INSERT TUNNELED CV CATH: CPT

## 2021-12-21 PROCEDURE — 77001 FLUOROGUIDE FOR VEIN DEVICE: CPT | Mod: 26

## 2021-12-21 PROCEDURE — C1788: CPT

## 2021-12-21 PROCEDURE — 99153 MOD SED SAME PHYS/QHP EA: CPT

## 2021-12-21 PROCEDURE — 77001 FLUOROGUIDE FOR VEIN DEVICE: CPT

## 2021-12-27 ENCOUNTER — APPOINTMENT (OUTPATIENT)
Dept: INTERVENTIONAL RADIOLOGY/VASCULAR | Facility: HOSPITAL | Age: 61
End: 2021-12-27

## 2022-01-14 ENCOUNTER — APPOINTMENT (OUTPATIENT)
Dept: INFECTIOUS DISEASE | Facility: CLINIC | Age: 62
End: 2022-01-14

## 2022-08-07 ENCOUNTER — EMERGENCY (EMERGENCY)
Facility: HOSPITAL | Age: 62
LOS: 1 days | Discharge: HOME | End: 2022-08-07
Attending: EMERGENCY MEDICINE | Admitting: EMERGENCY MEDICINE
Payer: MEDICARE

## 2022-08-07 VITALS
TEMPERATURE: 98 F | OXYGEN SATURATION: 99 % | HEART RATE: 89 BPM | SYSTOLIC BLOOD PRESSURE: 131 MMHG | RESPIRATION RATE: 17 BRPM | DIASTOLIC BLOOD PRESSURE: 71 MMHG

## 2022-08-07 VITALS
OXYGEN SATURATION: 99 % | HEART RATE: 94 BPM | HEIGHT: 70 IN | TEMPERATURE: 96 F | RESPIRATION RATE: 18 BRPM | SYSTOLIC BLOOD PRESSURE: 129 MMHG | WEIGHT: 235.01 LBS | DIASTOLIC BLOOD PRESSURE: 72 MMHG

## 2022-08-07 DIAGNOSIS — H26.9 UNSPECIFIED CATARACT: Chronic | ICD-10-CM

## 2022-08-07 DIAGNOSIS — Z90.49 ACQUIRED ABSENCE OF OTHER SPECIFIED PARTS OF DIGESTIVE TRACT: Chronic | ICD-10-CM

## 2022-08-07 DIAGNOSIS — Z98.890 OTHER SPECIFIED POSTPROCEDURAL STATES: Chronic | ICD-10-CM

## 2022-08-07 LAB
ALBUMIN SERPL ELPH-MCNC: 3.9 G/DL — SIGNIFICANT CHANGE UP (ref 3.3–5)
ALP SERPL-CCNC: 115 U/L — SIGNIFICANT CHANGE UP (ref 40–120)
ALT FLD-CCNC: 37 U/L — SIGNIFICANT CHANGE UP (ref 10–45)
ANION GAP SERPL CALC-SCNC: 14 MMOL/L — SIGNIFICANT CHANGE UP (ref 5–17)
APTT BLD: 32.6 SEC — SIGNIFICANT CHANGE UP (ref 27.5–35.5)
AST SERPL-CCNC: 36 U/L — SIGNIFICANT CHANGE UP (ref 10–40)
BASOPHILS # BLD AUTO: 0.03 K/UL — SIGNIFICANT CHANGE UP (ref 0–0.2)
BASOPHILS NFR BLD AUTO: 0.3 % — SIGNIFICANT CHANGE UP (ref 0–2)
BILIRUB SERPL-MCNC: 0.7 MG/DL — SIGNIFICANT CHANGE UP (ref 0.2–1.2)
BUN SERPL-MCNC: 14 MG/DL — SIGNIFICANT CHANGE UP (ref 7–23)
CALCIUM SERPL-MCNC: 9.2 MG/DL — SIGNIFICANT CHANGE UP (ref 8.4–10.5)
CHLORIDE SERPL-SCNC: 100 MMOL/L — SIGNIFICANT CHANGE UP (ref 96–108)
CO2 SERPL-SCNC: 26 MMOL/L — SIGNIFICANT CHANGE UP (ref 22–31)
CREAT SERPL-MCNC: 1.36 MG/DL — HIGH (ref 0.5–1.3)
EGFR: 58 ML/MIN/1.73M2 — LOW
EOSINOPHIL # BLD AUTO: 0.13 K/UL — SIGNIFICANT CHANGE UP (ref 0–0.5)
EOSINOPHIL NFR BLD AUTO: 1.5 % — SIGNIFICANT CHANGE UP (ref 0–6)
GLUCOSE SERPL-MCNC: 215 MG/DL — HIGH (ref 70–99)
HCT VFR BLD CALC: 36 % — LOW (ref 39–50)
HGB BLD-MCNC: 12 G/DL — LOW (ref 13–17)
IMM GRANULOCYTES NFR BLD AUTO: 0.9 % — SIGNIFICANT CHANGE UP (ref 0–1.5)
INR BLD: 1.03 — SIGNIFICANT CHANGE UP (ref 0.88–1.16)
LYMPHOCYTES # BLD AUTO: 4.13 K/UL — HIGH (ref 1–3.3)
LYMPHOCYTES # BLD AUTO: 46.1 % — HIGH (ref 13–44)
MCHC RBC-ENTMCNC: 32 PG — SIGNIFICANT CHANGE UP (ref 27–34)
MCHC RBC-ENTMCNC: 33.3 GM/DL — SIGNIFICANT CHANGE UP (ref 32–36)
MCV RBC AUTO: 96 FL — SIGNIFICANT CHANGE UP (ref 80–100)
MONOCYTES # BLD AUTO: 0.59 K/UL — SIGNIFICANT CHANGE UP (ref 0–0.9)
MONOCYTES NFR BLD AUTO: 6.6 % — SIGNIFICANT CHANGE UP (ref 2–14)
NEUTROPHILS # BLD AUTO: 3.99 K/UL — SIGNIFICANT CHANGE UP (ref 1.8–7.4)
NEUTROPHILS NFR BLD AUTO: 44.6 % — SIGNIFICANT CHANGE UP (ref 43–77)
NRBC # BLD: 0 /100 WBCS — SIGNIFICANT CHANGE UP (ref 0–0)
PLATELET # BLD AUTO: 228 K/UL — SIGNIFICANT CHANGE UP (ref 150–400)
POTASSIUM SERPL-MCNC: 4 MMOL/L — SIGNIFICANT CHANGE UP (ref 3.5–5.3)
POTASSIUM SERPL-SCNC: 4 MMOL/L — SIGNIFICANT CHANGE UP (ref 3.5–5.3)
PROT SERPL-MCNC: 6.8 G/DL — SIGNIFICANT CHANGE UP (ref 6–8.3)
PROTHROM AB SERPL-ACNC: 12.3 SEC — SIGNIFICANT CHANGE UP (ref 10.5–13.4)
RBC # BLD: 3.75 M/UL — LOW (ref 4.2–5.8)
RBC # FLD: 15.9 % — HIGH (ref 10.3–14.5)
SARS-COV-2 RNA SPEC QL NAA+PROBE: NEGATIVE — SIGNIFICANT CHANGE UP
SODIUM SERPL-SCNC: 140 MMOL/L — SIGNIFICANT CHANGE UP (ref 135–145)
WBC # BLD: 8.95 K/UL — SIGNIFICANT CHANGE UP (ref 3.8–10.5)
WBC # FLD AUTO: 8.95 K/UL — SIGNIFICANT CHANGE UP (ref 3.8–10.5)

## 2022-08-07 PROCEDURE — 70496 CT ANGIOGRAPHY HEAD: CPT | Mod: MC

## 2022-08-07 PROCEDURE — 70450 CT HEAD/BRAIN W/O DYE: CPT | Mod: MC

## 2022-08-07 PROCEDURE — 84484 ASSAY OF TROPONIN QUANT: CPT

## 2022-08-07 PROCEDURE — 87635 SARS-COV-2 COVID-19 AMP PRB: CPT

## 2022-08-07 PROCEDURE — 71275 CT ANGIOGRAPHY CHEST: CPT | Mod: MC

## 2022-08-07 PROCEDURE — 70498 CT ANGIOGRAPHY NECK: CPT | Mod: MC

## 2022-08-07 PROCEDURE — 93010 ELECTROCARDIOGRAM REPORT: CPT

## 2022-08-07 PROCEDURE — 99285 EMERGENCY DEPT VISIT HI MDM: CPT | Mod: 25

## 2022-08-07 PROCEDURE — 70498 CT ANGIOGRAPHY NECK: CPT | Mod: 26,MC

## 2022-08-07 PROCEDURE — 85730 THROMBOPLASTIN TIME PARTIAL: CPT

## 2022-08-07 PROCEDURE — 93005 ELECTROCARDIOGRAM TRACING: CPT

## 2022-08-07 PROCEDURE — 71275 CT ANGIOGRAPHY CHEST: CPT | Mod: 26,MC

## 2022-08-07 PROCEDURE — 80053 COMPREHEN METABOLIC PANEL: CPT

## 2022-08-07 PROCEDURE — 70496 CT ANGIOGRAPHY HEAD: CPT | Mod: 26,MC

## 2022-08-07 PROCEDURE — 71045 X-RAY EXAM CHEST 1 VIEW: CPT | Mod: 26

## 2022-08-07 PROCEDURE — 99285 EMERGENCY DEPT VISIT HI MDM: CPT

## 2022-08-07 PROCEDURE — 85025 COMPLETE CBC W/AUTO DIFF WBC: CPT

## 2022-08-07 PROCEDURE — 36415 COLL VENOUS BLD VENIPUNCTURE: CPT

## 2022-08-07 PROCEDURE — 70450 CT HEAD/BRAIN W/O DYE: CPT | Mod: 26,MC,59

## 2022-08-07 PROCEDURE — 85610 PROTHROMBIN TIME: CPT

## 2022-08-07 PROCEDURE — 71045 X-RAY EXAM CHEST 1 VIEW: CPT

## 2022-08-07 RX ORDER — OXYMETAZOLINE HYDROCHLORIDE 0.5 MG/ML
2 SPRAY NASAL ONCE
Refills: 0 | Status: COMPLETED | OUTPATIENT
Start: 2022-08-07 | End: 2022-08-07

## 2022-08-07 RX ORDER — SODIUM CHLORIDE 0.65 %
1 AEROSOL, SPRAY (ML) NASAL ONCE
Refills: 0 | Status: COMPLETED | OUTPATIENT
Start: 2022-08-07 | End: 2022-08-07

## 2022-08-07 RX ADMIN — OXYMETAZOLINE HYDROCHLORIDE 2 SPRAY(S): 0.5 SPRAY NASAL at 16:40

## 2022-08-07 RX ADMIN — Medication 1 SPRAY(S): at 16:45

## 2022-08-07 NOTE — ED ADULT TRIAGE NOTE - CHIEF COMPLAINT QUOTE
Pt presents to ED C/O worsening CP, SOB and posterior HA starting last night. Pt reports similar symptoms on Wed/Thurs. Pt blind, Citizen of [place] speaking, family at bedside, history of CA. 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 Pt able to ambulate with one assist. EKG in progress. Pt presents to ED C/O worsening CP, SOB and posterior HA starting last night. Pt reports similar symptoms on Wed/Thurs. Pt blind, Eritrean speaking, family at bedside, history of CA. 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 Pt able to ambulate with one assist. EKG in progress.

## 2022-08-07 NOTE — ED PROVIDER NOTE - CLINICAL SUMMARY MEDICAL DECISION MAKING FREE TEXT BOX
Pt with hx of skull base/sinus cancer on chemo with Dr. Sims - presents with multiple complaints including chest pain, dyspnea and epistaxis mostly at night when he is sleeping, vitals stable in ED , labs ok , pt had CT head and CTA chest r/o PE.  Pt with worsening metastatic disease in addition to more invasive erosive sinus cancer.  ENT called for opinion and resident contacted Dr. Stewart who states that pt is most likely palliative care at this point and non-operable.  Recommends afrin spray twice daily for three days and normal saline spray ad diane.

## 2022-08-07 NOTE — ED ADULT NURSE NOTE - CHIEF COMPLAINT QUOTE
Pt presents to ED C/O worsening CP, SOB and posterior HA starting last night. Pt reports similar symptoms on Wed/Thurs. Pt blind, Cypriot speaking, family at bedside, history of CA. 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 Pt able to ambulate with one assist. EKG in progress.

## 2022-08-07 NOTE — ED PROVIDER NOTE - PATIENT PORTAL LINK FT
You can access the FollowMyHealth Patient Portal offered by [facility] by registering at the following website: [email or web address]. By joining [employer]’s FollowMyHealth portal, you will also be able to view your health information using other applications (apps) compatible with our system.

## 2022-08-07 NOTE — ED ADULT NURSE NOTE - OBJECTIVE STATEMENT
61y M, A&ox3, presents to ed for left sided chest pain since wednesday,. Hx of nasal ca on chemo x2 a month, was scheduled for chemo on thursday but unable to get chemo due to going to another hospital for chest pain. Reports ongoing chest pain with mild sob, pt also reporting some neck pain. no headaches, no numbness nor tingling, no fevers nor chills, no cough. pt is also blind per family. ekg in progress.

## 2022-08-07 NOTE — ED PROVIDER NOTE - OBJECTIVE STATEMENT
62 y/o m with PMH of HTN, GERD, DM blind for last three months ? secondary to glaucoma, skull based cancer on chemotherapy followed by Dr. Sims - also known to Dr. Osullivan presenting with multiple complaints including nonspecific chest pain and shortness of breath in addition to daily nose bleeds and spitting up blood which wakes him up from sleep.  Pt and family poor historian.  Information mainly obtained from prior records.  According to records in 10/21 pt was taken to OR for surgical removal but infection was evident and surgery was aborted.  No surgical intervention since then.

## 2022-08-07 NOTE — ED PROVIDER NOTE - CARE PROVIDER_API CALL
Wei Stewart)  Otolaryngology  [address] [place] [address], [address] [place] [address]  Phone: [phone]  Fax: [phone]  Follow Up Time:

## 2022-08-09 DIAGNOSIS — I10 ESSENTIAL (PRIMARY) HYPERTENSION: ICD-10-CM

## 2022-08-09 DIAGNOSIS — R07.9 CHEST PAIN, UNSPECIFIED: ICD-10-CM

## 2022-08-09 DIAGNOSIS — Z20.822 CONTACT WITH AND (SUSPECTED) EXPOSURE TO COVID-19: ICD-10-CM

## 2022-08-09 DIAGNOSIS — K21.9 GASTRO-ESOPHAGEAL REFLUX DISEASE WITHOUT ESOPHAGITIS: ICD-10-CM

## 2022-08-09 DIAGNOSIS — R04.0 EPISTAXIS: ICD-10-CM

## 2022-08-09 DIAGNOSIS — R06.02 SHORTNESS OF BREATH: ICD-10-CM

## 2022-08-09 DIAGNOSIS — E11.9 TYPE 2 DIABETES MELLITUS WITHOUT COMPLICATIONS: ICD-10-CM

## 2022-08-09 DIAGNOSIS — Z88.0 ALLERGY STATUS TO PENICILLIN: ICD-10-CM

## 2022-10-04 ENCOUNTER — EMERGENCY (EMERGENCY)
Facility: HOSPITAL | Age: 62
LOS: 1 days | Discharge: HOME | End: 2022-10-04
Attending: STUDENT IN AN ORGANIZED HEALTH CARE EDUCATION/TRAINING PROGRAM | Admitting: STUDENT IN AN ORGANIZED HEALTH CARE EDUCATION/TRAINING PROGRAM
Payer: MEDICARE

## 2022-10-04 VITALS
RESPIRATION RATE: 18 BRPM | DIASTOLIC BLOOD PRESSURE: 84 MMHG | WEIGHT: 225.97 LBS | SYSTOLIC BLOOD PRESSURE: 115 MMHG | TEMPERATURE: 98 F | HEART RATE: 102 BPM | OXYGEN SATURATION: 98 % | HEIGHT: 70 IN

## 2022-10-04 VITALS
RESPIRATION RATE: 18 BRPM | DIASTOLIC BLOOD PRESSURE: 75 MMHG | OXYGEN SATURATION: 96 % | TEMPERATURE: 98 F | HEART RATE: 95 BPM | SYSTOLIC BLOOD PRESSURE: 138 MMHG

## 2022-10-04 DIAGNOSIS — Z98.890 OTHER SPECIFIED POSTPROCEDURAL STATES: Chronic | ICD-10-CM

## 2022-10-04 DIAGNOSIS — H26.9 UNSPECIFIED CATARACT: Chronic | ICD-10-CM

## 2022-10-04 DIAGNOSIS — Z90.49 ACQUIRED ABSENCE OF OTHER SPECIFIED PARTS OF DIGESTIVE TRACT: Chronic | ICD-10-CM

## 2022-10-04 LAB
ALBUMIN SERPL ELPH-MCNC: 3.3 G/DL — SIGNIFICANT CHANGE UP (ref 3.3–5)
ALP SERPL-CCNC: 112 U/L — SIGNIFICANT CHANGE UP (ref 40–120)
ALT FLD-CCNC: 41 U/L — SIGNIFICANT CHANGE UP (ref 10–45)
ANION GAP SERPL CALC-SCNC: 11 MMOL/L — SIGNIFICANT CHANGE UP (ref 5–17)
APTT BLD: 26.6 SEC — LOW (ref 27.5–35.5)
AST SERPL-CCNC: 36 U/L — SIGNIFICANT CHANGE UP (ref 10–40)
B-OH-BUTYR SERPL-SCNC: 1 MMOL/L — HIGH
BASE EXCESS BLDV CALC-SCNC: 0 MMOL/L — SIGNIFICANT CHANGE UP (ref -2–3)
BASOPHILS # BLD AUTO: 0.02 K/UL — SIGNIFICANT CHANGE UP (ref 0–0.2)
BASOPHILS NFR BLD AUTO: 0.2 % — SIGNIFICANT CHANGE UP (ref 0–2)
BILIRUB SERPL-MCNC: 0.7 MG/DL — SIGNIFICANT CHANGE UP (ref 0.2–1.2)
BUN SERPL-MCNC: 14 MG/DL — SIGNIFICANT CHANGE UP (ref 7–23)
CA-I SERPL-SCNC: 1.15 MMOL/L — SIGNIFICANT CHANGE UP (ref 1.15–1.33)
CALCIUM SERPL-MCNC: 9 MG/DL — SIGNIFICANT CHANGE UP (ref 8.4–10.5)
CHLORIDE SERPL-SCNC: 87 MMOL/L — LOW (ref 96–108)
CO2 BLDV-SCNC: 26.7 MMOL/L — HIGH (ref 22–26)
CO2 SERPL-SCNC: 27 MMOL/L — SIGNIFICANT CHANGE UP (ref 22–31)
CREAT SERPL-MCNC: 1.07 MG/DL — SIGNIFICANT CHANGE UP (ref 0.5–1.3)
EGFR: 77 ML/MIN/1.73M2 — SIGNIFICANT CHANGE UP
EOSINOPHIL # BLD AUTO: 0.01 K/UL — SIGNIFICANT CHANGE UP (ref 0–0.5)
EOSINOPHIL NFR BLD AUTO: 0.1 % — SIGNIFICANT CHANGE UP (ref 0–6)
GAS PNL BLDV: 126 MMOL/L — LOW (ref 136–145)
GAS PNL BLDV: SIGNIFICANT CHANGE UP
GAS PNL BLDV: SIGNIFICANT CHANGE UP
GLUCOSE SERPL-MCNC: 639 MG/DL — CRITICAL HIGH (ref 70–99)
HCO3 BLDV-SCNC: 25 MMOL/L — SIGNIFICANT CHANGE UP (ref 22–29)
HCT VFR BLD CALC: 35.8 % — LOW (ref 39–50)
HGB BLD-MCNC: 12.1 G/DL — LOW (ref 13–17)
IMM GRANULOCYTES NFR BLD AUTO: 1 % — HIGH (ref 0–0.9)
INR BLD: 0.98 — SIGNIFICANT CHANGE UP (ref 0.88–1.16)
LYMPHOCYTES # BLD AUTO: 1.76 K/UL — SIGNIFICANT CHANGE UP (ref 1–3.3)
LYMPHOCYTES # BLD AUTO: 21.1 % — SIGNIFICANT CHANGE UP (ref 13–44)
MCHC RBC-ENTMCNC: 32.1 PG — SIGNIFICANT CHANGE UP (ref 27–34)
MCHC RBC-ENTMCNC: 33.8 GM/DL — SIGNIFICANT CHANGE UP (ref 32–36)
MCV RBC AUTO: 95 FL — SIGNIFICANT CHANGE UP (ref 80–100)
MONOCYTES # BLD AUTO: 0.36 K/UL — SIGNIFICANT CHANGE UP (ref 0–0.9)
MONOCYTES NFR BLD AUTO: 4.3 % — SIGNIFICANT CHANGE UP (ref 2–14)
NEUTROPHILS # BLD AUTO: 6.12 K/UL — SIGNIFICANT CHANGE UP (ref 1.8–7.4)
NEUTROPHILS NFR BLD AUTO: 73.3 % — SIGNIFICANT CHANGE UP (ref 43–77)
NRBC # BLD: 0 /100 WBCS — SIGNIFICANT CHANGE UP (ref 0–0)
NT-PROBNP SERPL-SCNC: 274 PG/ML — SIGNIFICANT CHANGE UP (ref 0–300)
PCO2 BLDV: 43 MMHG — SIGNIFICANT CHANGE UP (ref 42–55)
PH BLDV: 7.38 — SIGNIFICANT CHANGE UP (ref 7.32–7.43)
PLATELET # BLD AUTO: 150 K/UL — SIGNIFICANT CHANGE UP (ref 150–400)
PO2 BLDV: 67 MMHG — HIGH (ref 25–45)
POTASSIUM BLDV-SCNC: 4.3 MMOL/L — SIGNIFICANT CHANGE UP (ref 3.5–5.1)
POTASSIUM SERPL-MCNC: SIGNIFICANT CHANGE UP (ref 3.5–5.3)
POTASSIUM SERPL-SCNC: SIGNIFICANT CHANGE UP (ref 3.5–5.3)
PROT SERPL-MCNC: 6.7 G/DL — SIGNIFICANT CHANGE UP (ref 6–8.3)
PROTHROM AB SERPL-ACNC: 11.7 SEC — SIGNIFICANT CHANGE UP (ref 10.5–13.4)
RAPID RVP RESULT: SIGNIFICANT CHANGE UP
RBC # BLD: 3.77 M/UL — LOW (ref 4.2–5.8)
RBC # FLD: 13.3 % — SIGNIFICANT CHANGE UP (ref 10.3–14.5)
SAO2 % BLDV: 90.7 % — HIGH (ref 67–88)
SARS-COV-2 RNA SPEC QL NAA+PROBE: SIGNIFICANT CHANGE UP
SODIUM SERPL-SCNC: 125 MMOL/L — LOW (ref 135–145)
TROPONIN T SERPL-MCNC: 0.01 NG/ML — SIGNIFICANT CHANGE UP (ref 0–0.01)
WBC # BLD: 8.35 K/UL — SIGNIFICANT CHANGE UP (ref 3.8–10.5)
WBC # FLD AUTO: 8.35 K/UL — SIGNIFICANT CHANGE UP (ref 3.8–10.5)

## 2022-10-04 PROCEDURE — 0225U NFCT DS DNA&RNA 21 SARSCOV2: CPT

## 2022-10-04 PROCEDURE — 85025 COMPLETE CBC W/AUTO DIFF WBC: CPT

## 2022-10-04 PROCEDURE — 85610 PROTHROMBIN TIME: CPT

## 2022-10-04 PROCEDURE — 99285 EMERGENCY DEPT VISIT HI MDM: CPT | Mod: 25

## 2022-10-04 PROCEDURE — 71045 X-RAY EXAM CHEST 1 VIEW: CPT

## 2022-10-04 PROCEDURE — 93005 ELECTROCARDIOGRAM TRACING: CPT

## 2022-10-04 PROCEDURE — 82330 ASSAY OF CALCIUM: CPT

## 2022-10-04 PROCEDURE — 99285 EMERGENCY DEPT VISIT HI MDM: CPT

## 2022-10-04 PROCEDURE — 70486 CT MAXILLOFACIAL W/O DYE: CPT | Mod: 26,QQ

## 2022-10-04 PROCEDURE — 70450 CT HEAD/BRAIN W/O DYE: CPT | Mod: 26,QQ

## 2022-10-04 PROCEDURE — 84295 ASSAY OF SERUM SODIUM: CPT

## 2022-10-04 PROCEDURE — 70486 CT MAXILLOFACIAL W/O DYE: CPT | Mod: QQ

## 2022-10-04 PROCEDURE — 96360 HYDRATION IV INFUSION INIT: CPT

## 2022-10-04 PROCEDURE — 73620 X-RAY EXAM OF FOOT: CPT | Mod: 26,LT,RT

## 2022-10-04 PROCEDURE — 36415 COLL VENOUS BLD VENIPUNCTURE: CPT

## 2022-10-04 PROCEDURE — 82962 GLUCOSE BLOOD TEST: CPT

## 2022-10-04 PROCEDURE — 73620 X-RAY EXAM OF FOOT: CPT

## 2022-10-04 PROCEDURE — 70450 CT HEAD/BRAIN W/O DYE: CPT | Mod: QQ

## 2022-10-04 PROCEDURE — 80053 COMPREHEN METABOLIC PANEL: CPT

## 2022-10-04 PROCEDURE — 84132 ASSAY OF SERUM POTASSIUM: CPT

## 2022-10-04 PROCEDURE — 84484 ASSAY OF TROPONIN QUANT: CPT

## 2022-10-04 PROCEDURE — 71045 X-RAY EXAM CHEST 1 VIEW: CPT | Mod: 26

## 2022-10-04 PROCEDURE — 82803 BLOOD GASES ANY COMBINATION: CPT

## 2022-10-04 PROCEDURE — 82010 KETONE BODYS QUAN: CPT

## 2022-10-04 PROCEDURE — 83880 ASSAY OF NATRIURETIC PEPTIDE: CPT

## 2022-10-04 PROCEDURE — 85730 THROMBOPLASTIN TIME PARTIAL: CPT

## 2022-10-04 RX ORDER — ACETAMINOPHEN 500 MG
650 TABLET ORAL ONCE
Refills: 0 | Status: COMPLETED | OUTPATIENT
Start: 2022-10-04 | End: 2022-10-04

## 2022-10-04 RX ORDER — SODIUM CHLORIDE 9 MG/ML
1000 INJECTION INTRAMUSCULAR; INTRAVENOUS; SUBCUTANEOUS ONCE
Refills: 0 | Status: COMPLETED | OUTPATIENT
Start: 2022-10-04 | End: 2022-10-04

## 2022-10-04 RX ADMIN — Medication 650 MILLIGRAM(S): at 17:24

## 2022-10-04 RX ADMIN — SODIUM CHLORIDE 1000 MILLILITER(S): 9 INJECTION INTRAMUSCULAR; INTRAVENOUS; SUBCUTANEOUS at 16:02

## 2022-10-04 RX ADMIN — Medication 650 MILLIGRAM(S): at 16:02

## 2022-10-04 RX ADMIN — SODIUM CHLORIDE 1000 MILLILITER(S): 9 INJECTION INTRAMUSCULAR; INTRAVENOUS; SUBCUTANEOUS at 17:24

## 2022-10-04 NOTE — ED PROVIDER NOTE - ENMT, MLM
Airway patent, Nasal mucosa clear. tms with normal light reflex bilat no canal exudate/erythema/edema

## 2022-10-04 NOTE — ED PROVIDER NOTE - PHYSICAL EXAMINATION
NEURO: facial sensation intact to light touch in all 3 divisions bilat (CN V), face is symmetric with normal eye closure, eye opening, and smile (CN VII), hearing is normal to rubbing fingers (CN VII), palate elevates symmetrically, phonation is normal (CN IX, X),  shoulder shrug intact bilat (CN XI), tongue is midline with nl movements and no atrophy (CN XII), finger to nose test nl bilat, negative pronator drift bilat,, speech is clear; 5/5 motor strength BUE and BLE: deltoids, biceps, triceps, wrist flexors/extensors, hand , hip flexors, knee flexors/extensors, plantar/dorsiflexors, hallux flexors/extensors; sensation intact to light touch BUE and BLE: C5-T1 and L3-S1     pt states he doesn't think he can walk so did not attempt ambulation

## 2022-10-04 NOTE — ED ADULT NURSE REASSESSMENT NOTE - NS ED NURSE REASSESS COMMENT FT1
Patient asleep, responds to voice, no c/o of pain, SOB or any symptoms.  Vital signs stble. Ct scan done.  Additional labs sent.  REsults and disposition pending.  Reverse isolation observed.

## 2022-10-04 NOTE — ED PROVIDER NOTE - OBJECTIVE STATEMENT
62M c PMH of "nasal cavity tumor" (s/p radiation and CT, no surgery), HTN and PSH of cholecystectomy p/w 4 day hx of cough with non-bloody sputum production, headache, and chest pain. also reports pain on the soles of both feet, no hx of trauma. 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 was sent by his oncologist for CTH . family at bedside states for past few days pt unable to walk which is new    triage note: "Pt has hx of "tumor behind the nasal cavity" started chemo tx on 9/27, 9/28. Reports cough, loss of appetite, generalized weakness, sob since Friday. No known fevers at home. Denies abd pain, n/v."

## 2022-10-04 NOTE — ED PROVIDER NOTE - EYES, MLM
bilat white/yellow discharge over both eyelids no edema/erythema R pupil 4 mm reactive unable to visualize L pupil pt unable to keep eye open due to discomfort

## 2022-10-04 NOTE — ED ADULT NURSE NOTE - OBJECTIVE STATEMENT
Patient w/ Hx of Nasal adenoid CA, had recent chemotherapy on 9/27 and  9/28, c/o of genealized weakness, cough, SOB, body aches.  Patient w/ Hx of vision disturbance due to nasal CA.  Right chest chemoport in place.  pMHx  DM, HTN, glaucoma.

## 2022-10-04 NOTE — ED PROVIDER NOTE - PROGRESS NOTE DETAILS
CTH: IMPRESSION:  No change in large soft tissue mass within the posterior nasal cavity   with marked bony destructive changes of the posterior ethmoid and   sphenoid sinuses, clivus and lesser wings of the sphenoid bones.    CT max face: IMPRESSION: Interval progression of destructive soft tissue mass centered   within the sphenoid sinus. Precise tumor mapping is not possible on this   noncontrast CT and would be best accomplished utilizing contrast enhanced   MRI.    multiple attempts made to reach pts oncologist and house oncology by clerks and myself with no callback.     XR foot shows calcaneal spur cxr neg covid neg      glu 639 > 513, corrected Na 134. on reassessment pt awake conversant appears in nad     pt and family agree that they will show CT results to their oncologist on Thursday at their appointment.  will help refer pt to podiatry for spurs. will dc

## 2022-10-04 NOTE — ED PROVIDER NOTE - CPE EDP EYES NORM
Hide Include Location In Plan Question?: No Detail Level: Generalized Additional Note: spf daily normal...

## 2022-10-04 NOTE — ED ADULT NURSE REASSESSMENT NOTE - NS ED NURSE REASSESS COMMENT FT1
Assumed care of pt. 63 y/o M with pmhx nasal tumor in ED c/o worsening HA and sputum production x4 days, now with tumor progression on CT. Awaiting oncology consult. Pt sitting comfortably in bed at this time with wife at bedside. Will continue to monitor.

## 2022-10-04 NOTE — ED ADULT NURSE REASSESSMENT NOTE - NS ED NURSE REASSESS COMMENT FT1
Patient a/oX3, c/o of SOB, weakness and body aches, no difficulty speaking in long sentences.  Sinus tacchycardia on EKG, other vitals stable.  Left hand PIV #20 in place, all labs sent, no complications.   NSS 1 L bolus ongoing, Acetaminophen 650mg PO adminsitered.  CT scan, Xray pending.  Family at bedside.  Fall precaution and reverse isolation observed.

## 2022-10-04 NOTE — ED PROVIDER NOTE - CLINICAL SUMMARY MEDICAL DECISION MAKING FREE TEXT BOX
62M c PMH of "nasal cavity tumor" (s/p radiation and CT, no surgery), HTN and PSH of cholecystectomy p/w 4 day hx of cough with non-bloody sputum production, headache, and chest pain. On exam, , VS otherwise wnl, +bilat eye discharge, non-focal neuro exam, unable to visualize L eye, pt states he does not think he can ambulate.    ddx: tumor extension vs infection vs covid vs mets vs pna     Plan:  - labs  - CTH  - ekg  - cxr  - XR feet  - covid test  - ivf  - tylenol   - page pts oncologist  - reassess

## 2022-10-04 NOTE — ED ADULT TRIAGE NOTE - CHIEF COMPLAINT QUOTE
Pt has hx of "tumor behind the nasal cavity" started chemo tx on 9/27, 9/28. Reports cough, loss of appetite, generalized weakness, sob since Friday. No known fevers at home. Denies abd pain, n/v.

## 2022-10-04 NOTE — ED PROVIDER NOTE - CARDIAC, MLM
Dr Lopez Normal rate, regular rhythm.  Heart sounds S1, S2.  No murmurs, rubs or gallops. no bilat le edema

## 2022-10-04 NOTE — ED PROVIDER NOTE - NSFOLLOWUPINSTRUCTIONS_ED_ALL_ED_FT
Please reach out to Naila Irwin ([facility] ED clinical referral coordinator) to assist you with your follow-up appointment.     Monday - Friday 11am-7pm  [phone]  [email or web address]     Please help arrange podiatry followup outpatient.    1. You were seen for cough. A copy of any of your resulted labs, imaging, and findings have been provided to you. Make sure to view any test results that may not have yet resulted at the time of your discharge by creating a FollowMyHealth account at: [email or web address] to sign up for FollowMyHealth.   2. Continue to take your home medications as prescribed.   3. Follow up with your ONCOLOGIST and primary care doctor within 48 hours to assess the symptoms you were seen for in the emergency department and to review all results from your visit. If you don't have a doctor, call [phone] to make an appointment.  4. Return immediately to the emergency department for new, persistent, or worsening symptoms or signs. Return immediately to the emergency department if you have chest pain, shortness of breath, loss of consciousness, fever or vomiting, or inability to move or feel your arms or legs.   5. For your for health, you should make healthy food choices and be physically active. Also, you should not smoke or use drugs, and you should not drink alcohol in excess. Please visit [email or web address] for resources and more information.

## 2022-10-04 NOTE — ED PROVIDER NOTE - PATIENT PORTAL LINK FT
You can access the FollowMyHealth Patient Portal offered by [facility] by registering at the following website: [email or web address]. By joining [employer]’s FollowMyHealth portal, you will also be able to view your health information using other applications (apps) compatible with our system.

## 2022-10-07 DIAGNOSIS — R09.81 NASAL CONGESTION: ICD-10-CM

## 2022-10-07 DIAGNOSIS — I10 ESSENTIAL (PRIMARY) HYPERTENSION: ICD-10-CM

## 2022-10-07 DIAGNOSIS — E11.9 TYPE 2 DIABETES MELLITUS WITHOUT COMPLICATIONS: ICD-10-CM

## 2022-10-07 DIAGNOSIS — M77.30 CALCANEAL SPUR, UNSPECIFIED FOOT: ICD-10-CM

## 2022-10-07 DIAGNOSIS — R53.1 WEAKNESS: ICD-10-CM

## 2022-10-07 DIAGNOSIS — Z79.01 LONG TERM (CURRENT) USE OF ANTICOAGULANTS: ICD-10-CM

## 2022-10-07 DIAGNOSIS — R06.02 SHORTNESS OF BREATH: ICD-10-CM

## 2022-10-07 DIAGNOSIS — Z92.3 PERSONAL HISTORY OF IRRADIATION: ICD-10-CM

## 2022-10-07 DIAGNOSIS — Z90.49 ACQUIRED ABSENCE OF OTHER SPECIFIED PARTS OF DIGESTIVE TRACT: ICD-10-CM

## 2022-10-07 DIAGNOSIS — H40.9 UNSPECIFIED GLAUCOMA: ICD-10-CM

## 2022-10-07 DIAGNOSIS — R63.0 ANOREXIA: ICD-10-CM

## 2022-10-07 DIAGNOSIS — Z85.22 PERSONAL HISTORY OF MALIGNANT NEOPLASM OF NASAL CAVITIES, MIDDLE EAR, AND ACCESSORY SINUSES: ICD-10-CM

## 2022-10-07 DIAGNOSIS — Z20.822 CONTACT WITH AND (SUSPECTED) EXPOSURE TO COVID-19: ICD-10-CM

## 2022-10-07 DIAGNOSIS — R07.89 OTHER CHEST PAIN: ICD-10-CM

## 2022-10-07 DIAGNOSIS — M79.671 PAIN IN RIGHT FOOT: ICD-10-CM

## 2022-10-07 DIAGNOSIS — R51.9 HEADACHE, UNSPECIFIED: ICD-10-CM

## 2022-10-07 DIAGNOSIS — R05.8 OTHER SPECIFIED COUGH: ICD-10-CM

## 2022-10-07 DIAGNOSIS — M79.672 PAIN IN LEFT FOOT: ICD-10-CM

## 2022-10-07 DIAGNOSIS — J34.89 OTHER SPECIFIED DISORDERS OF NOSE AND NASAL SINUSES: ICD-10-CM

## 2022-10-07 DIAGNOSIS — Z87.19 PERSONAL HISTORY OF OTHER DISEASES OF THE DIGESTIVE SYSTEM: ICD-10-CM

## 2022-10-07 DIAGNOSIS — Z88.0 ALLERGY STATUS TO PENICILLIN: ICD-10-CM

## 2022-10-07 DIAGNOSIS — H54.7 UNSPECIFIED VISUAL LOSS: ICD-10-CM

## 2022-12-07 NOTE — PROGRESS NOTE ADULT - EXTREMITIES
No cyanosis, clubbing or edema
No cyanosis, clubbing or edema
A-T Advancement Flap Text: Because of the full-thickness nature of the defect and location adjacent to important structure(s), a bilateral advancement flap (A to T) was planned. After prep and anesthesia, a Burow's triangle was excised adjacent to the defect.  Incisions were extended from the opposite side of the wound, and smaller Burow’s triangles at the end of each incision.  Two flaps were created by undermining in the subcutaneous plane. After hemostasis was obtained, the flaps were advanced and sutured in a layered fashion.
[id number]

## 2022-12-13 NOTE — ED ADULT NURSE NOTE - CARDIO ASSESSMENT
Patient was called with colonoscopy results. Her      Findings:   2 mm descending colon polyp-polypectomy cold forceps. Mild sigmoid diverticulosis. Submucosal rectal mass at 6 cm. Pathology showed:  Final Pathologic Diagnosis:   Colon, descending, polypectomy:   -  Polypoid fragment of benign colonic mucosa containing   features suggestive of hyperplastic polyp. Will plan to complete MRI of lower pelvis to evaluate the rectal mass since we do not have endorectal US availability. Discussed with patient who agreed with MRI and she mentioned if colorectal surgeon would be needed she would prefer to have this done in [facility]. ---

## 2023-01-17 NOTE — DISCHARGE NOTE PROVIDER - NSDCHHATTENDCERT_GEN_ALL_CORE
17-Jan-2023 19:57 My signature below certifies that the above stated patient is homebound and upon completion of the Face-To-Face encounter, has the need for intermittent skilled nursing, physical therapy and/or speech or occupational therapy services in their home for their current diagnosis as outlined in their initial plan of care. These services will continue to be monitored by myself or another physician.

## 2023-06-01 ENCOUNTER — INPATIENT (INPATIENT)
Facility: HOSPITAL | Age: 63
LOS: 3 days | Discharge: HOME HEALTH | DRG: 689 | End: 2023-06-05
Attending: INTERNAL MEDICINE | Admitting: INTERNAL MEDICINE
Payer: MEDICARE

## 2023-06-01 VITALS
WEIGHT: 248.24 LBS | RESPIRATION RATE: 18 BRPM | DIASTOLIC BLOOD PRESSURE: 30 MMHG | OXYGEN SATURATION: 100 % | SYSTOLIC BLOOD PRESSURE: 58 MMHG | HEART RATE: 90 BPM

## 2023-06-01 DIAGNOSIS — Z90.49 ACQUIRED ABSENCE OF OTHER SPECIFIED PARTS OF DIGESTIVE TRACT: Chronic | ICD-10-CM

## 2023-06-01 DIAGNOSIS — E11.9 TYPE 2 DIABETES MELLITUS WITHOUT COMPLICATIONS: ICD-10-CM

## 2023-06-01 DIAGNOSIS — C11.1 MALIGNANT NEOPLASM OF POSTERIOR WALL OF NASOPHARYNX: ICD-10-CM

## 2023-06-01 DIAGNOSIS — I95.9 HYPOTENSION, UNSPECIFIED: ICD-10-CM

## 2023-06-01 DIAGNOSIS — H26.9 UNSPECIFIED CATARACT: Chronic | ICD-10-CM

## 2023-06-01 DIAGNOSIS — Z98.890 OTHER SPECIFIED POSTPROCEDURAL STATES: Chronic | ICD-10-CM

## 2023-06-01 DIAGNOSIS — N17.9 ACUTE KIDNEY FAILURE, UNSPECIFIED: ICD-10-CM

## 2023-06-01 DIAGNOSIS — Z29.9 ENCOUNTER FOR PROPHYLACTIC MEASURES, UNSPECIFIED: ICD-10-CM

## 2023-06-01 DIAGNOSIS — I10 ESSENTIAL (PRIMARY) HYPERTENSION: ICD-10-CM

## 2023-06-01 DIAGNOSIS — Z86.69 PERSONAL HISTORY OF OTHER DISEASES OF THE NERVOUS SYSTEM AND SENSE ORGANS: ICD-10-CM

## 2023-06-01 LAB
ALBUMIN SERPL ELPH-MCNC: 3.3 G/DL — SIGNIFICANT CHANGE UP (ref 3.3–5)
ALP SERPL-CCNC: 109 U/L — SIGNIFICANT CHANGE UP (ref 40–120)
ALT FLD-CCNC: 28 U/L — SIGNIFICANT CHANGE UP (ref 10–45)
ANION GAP SERPL CALC-SCNC: 13 MMOL/L — SIGNIFICANT CHANGE UP (ref 5–17)
APPEARANCE UR: CLEAR — SIGNIFICANT CHANGE UP
APTT BLD: 39.9 SEC — HIGH (ref 27.5–35.5)
AST SERPL-CCNC: 38 U/L — SIGNIFICANT CHANGE UP (ref 10–40)
BASE EXCESS BLDV CALC-SCNC: -1 MMOL/L — SIGNIFICANT CHANGE UP (ref -2–3)
BASOPHILS # BLD AUTO: 0.04 K/UL — SIGNIFICANT CHANGE UP (ref 0–0.2)
BASOPHILS NFR BLD AUTO: 0.4 % — SIGNIFICANT CHANGE UP (ref 0–2)
BILIRUB SERPL-MCNC: 0.7 MG/DL — SIGNIFICANT CHANGE UP (ref 0.2–1.2)
BILIRUB UR-MCNC: NEGATIVE — SIGNIFICANT CHANGE UP
BLD GP AB SCN SERPL QL: NEGATIVE — SIGNIFICANT CHANGE UP
BUN SERPL-MCNC: 7 MG/DL — SIGNIFICANT CHANGE UP (ref 7–23)
CA-I SERPL-SCNC: 1.04 MMOL/L — LOW (ref 1.15–1.33)
CALCIUM SERPL-MCNC: 8 MG/DL — LOW (ref 8.4–10.5)
CHLORIDE SERPL-SCNC: 99 MMOL/L — SIGNIFICANT CHANGE UP (ref 96–108)
CHOLEST SERPL-MCNC: 201 MG/DL — HIGH
CO2 BLDV-SCNC: 28.5 MMOL/L — HIGH (ref 22–26)
CO2 SERPL-SCNC: 24 MMOL/L — SIGNIFICANT CHANGE UP (ref 22–31)
COLOR SPEC: YELLOW — SIGNIFICANT CHANGE UP
CREAT SERPL-MCNC: 1.87 MG/DL — HIGH (ref 0.5–1.3)
DIFF PNL FLD: NEGATIVE — SIGNIFICANT CHANGE UP
EGFR: 40 ML/MIN/1.73M2 — LOW
EOSINOPHIL # BLD AUTO: 0.08 K/UL — SIGNIFICANT CHANGE UP (ref 0–0.5)
EOSINOPHIL NFR BLD AUTO: 0.8 % — SIGNIFICANT CHANGE UP (ref 0–6)
GAS PNL BLDV: 131 MMOL/L — LOW (ref 136–145)
GAS PNL BLDV: SIGNIFICANT CHANGE UP
GLUCOSE BLDC GLUCOMTR-MCNC: 268 MG/DL — HIGH (ref 70–99)
GLUCOSE SERPL-MCNC: 165 MG/DL — HIGH (ref 70–99)
GLUCOSE UR QL: NEGATIVE — SIGNIFICANT CHANGE UP
HCO3 BLDV-SCNC: 27 MMOL/L — SIGNIFICANT CHANGE UP (ref 22–29)
HCT VFR BLD CALC: 40.3 % — SIGNIFICANT CHANGE UP (ref 39–50)
HDLC SERPL-MCNC: 26 MG/DL — LOW
HGB BLD-MCNC: 13.2 G/DL — SIGNIFICANT CHANGE UP (ref 13–17)
IMM GRANULOCYTES NFR BLD AUTO: 1.2 % — HIGH (ref 0–0.9)
INR BLD: 1.07 — SIGNIFICANT CHANGE UP (ref 0.88–1.16)
KETONES UR-MCNC: NEGATIVE — SIGNIFICANT CHANGE UP
LACTATE SERPL-SCNC: 1.7 MMOL/L — SIGNIFICANT CHANGE UP (ref 0.5–2)
LEUKOCYTE ESTERASE UR-ACNC: NEGATIVE — SIGNIFICANT CHANGE UP
LIPID PNL WITH DIRECT LDL SERPL: 112 MG/DL — HIGH
LYMPHOCYTES # BLD AUTO: 3.22 K/UL — SIGNIFICANT CHANGE UP (ref 1–3.3)
LYMPHOCYTES # BLD AUTO: 32.8 % — SIGNIFICANT CHANGE UP (ref 13–44)
MCHC RBC-ENTMCNC: 31.1 PG — SIGNIFICANT CHANGE UP (ref 27–34)
MCHC RBC-ENTMCNC: 32.8 GM/DL — SIGNIFICANT CHANGE UP (ref 32–36)
MCV RBC AUTO: 94.8 FL — SIGNIFICANT CHANGE UP (ref 80–100)
MONOCYTES # BLD AUTO: 0.41 K/UL — SIGNIFICANT CHANGE UP (ref 0–0.9)
MONOCYTES NFR BLD AUTO: 4.2 % — SIGNIFICANT CHANGE UP (ref 2–14)
NEUTROPHILS # BLD AUTO: 5.95 K/UL — SIGNIFICANT CHANGE UP (ref 1.8–7.4)
NEUTROPHILS NFR BLD AUTO: 60.6 % — SIGNIFICANT CHANGE UP (ref 43–77)
NITRITE UR-MCNC: NEGATIVE — SIGNIFICANT CHANGE UP
NON HDL CHOLESTEROL: 175 MG/DL — HIGH
NRBC # BLD: 0 /100 WBCS — SIGNIFICANT CHANGE UP (ref 0–0)
PCO2 BLDV: 57 MMHG — HIGH (ref 42–55)
PH BLDV: 7.28 — LOW (ref 7.32–7.43)
PH UR: 6.5 — SIGNIFICANT CHANGE UP (ref 5–8)
PLATELET # BLD AUTO: 209 K/UL — SIGNIFICANT CHANGE UP (ref 150–400)
PO2 BLDV: 36 MMHG — SIGNIFICANT CHANGE UP (ref 25–45)
POTASSIUM BLDV-SCNC: 4 MMOL/L — SIGNIFICANT CHANGE UP (ref 3.5–5.1)
POTASSIUM SERPL-MCNC: 4.3 MMOL/L — SIGNIFICANT CHANGE UP (ref 3.5–5.3)
POTASSIUM SERPL-SCNC: 4.3 MMOL/L — SIGNIFICANT CHANGE UP (ref 3.5–5.3)
PROT SERPL-MCNC: 5.7 G/DL — LOW (ref 6–8.3)
PROT UR-MCNC: NEGATIVE MG/DL — SIGNIFICANT CHANGE UP
PROTHROM AB SERPL-ACNC: 12.8 SEC — SIGNIFICANT CHANGE UP (ref 10.5–13.4)
RAPID RVP RESULT: SIGNIFICANT CHANGE UP
RBC # BLD: 4.25 M/UL — SIGNIFICANT CHANGE UP (ref 4.2–5.8)
RBC # FLD: 14.8 % — HIGH (ref 10.3–14.5)
RH IG SCN BLD-IMP: POSITIVE — SIGNIFICANT CHANGE UP
SAO2 % BLDV: SIGNIFICANT CHANGE UP % (ref 67–88)
SARS-COV-2 RNA SPEC QL NAA+PROBE: SIGNIFICANT CHANGE UP
SODIUM SERPL-SCNC: 136 MMOL/L — SIGNIFICANT CHANGE UP (ref 135–145)
SP GR SPEC: 1.01 — SIGNIFICANT CHANGE UP (ref 1–1.03)
TRIGL SERPL-MCNC: 317 MG/DL — HIGH
TROPONIN T SERPL-MCNC: 0.03 NG/ML — HIGH (ref 0–0.01)
UROBILINOGEN FLD QL: 0.2 E.U./DL — SIGNIFICANT CHANGE UP
WBC # BLD: 9.82 K/UL — SIGNIFICANT CHANGE UP (ref 3.8–10.5)
WBC # FLD AUTO: 9.82 K/UL — SIGNIFICANT CHANGE UP (ref 3.8–10.5)

## 2023-06-01 PROCEDURE — 99291 CRITICAL CARE FIRST HOUR: CPT

## 2023-06-01 PROCEDURE — 70450 CT HEAD/BRAIN W/O DYE: CPT | Mod: 26,MA

## 2023-06-01 PROCEDURE — 99223 1ST HOSP IP/OBS HIGH 75: CPT

## 2023-06-01 PROCEDURE — 71045 X-RAY EXAM CHEST 1 VIEW: CPT | Mod: 26

## 2023-06-01 RX ORDER — SODIUM CHLORIDE 9 MG/ML
1000 INJECTION INTRAMUSCULAR; INTRAVENOUS; SUBCUTANEOUS ONCE
Refills: 0 | Status: COMPLETED | OUTPATIENT
Start: 2023-06-01 | End: 2023-06-01

## 2023-06-01 RX ORDER — INSULIN GLARGINE 100 [IU]/ML
7 INJECTION, SOLUTION SUBCUTANEOUS AT BEDTIME
Refills: 0 | Status: DISCONTINUED | OUTPATIENT
Start: 2023-06-01 | End: 2023-06-05

## 2023-06-01 RX ORDER — DEXTROSE 50 % IN WATER 50 %
15 SYRINGE (ML) INTRAVENOUS ONCE
Refills: 0 | Status: DISCONTINUED | OUTPATIENT
Start: 2023-06-01 | End: 2023-06-05

## 2023-06-01 RX ORDER — GLUCAGON INJECTION, SOLUTION 0.5 MG/.1ML
1 INJECTION, SOLUTION SUBCUTANEOUS ONCE
Refills: 0 | Status: DISCONTINUED | OUTPATIENT
Start: 2023-06-01 | End: 2023-06-05

## 2023-06-01 RX ORDER — ATORVASTATIN CALCIUM 80 MG/1
40 TABLET, FILM COATED ORAL AT BEDTIME
Refills: 0 | Status: DISCONTINUED | OUTPATIENT
Start: 2023-06-01 | End: 2023-06-05

## 2023-06-01 RX ORDER — LATANOPROST 0.05 MG/ML
1 SOLUTION/ DROPS OPHTHALMIC; TOPICAL AT BEDTIME
Refills: 0 | Status: DISCONTINUED | OUTPATIENT
Start: 2023-06-01 | End: 2023-06-05

## 2023-06-01 RX ORDER — METFORMIN HYDROCHLORIDE 850 MG/1
1 TABLET ORAL
Refills: 0 | DISCHARGE

## 2023-06-01 RX ORDER — BRIMONIDINE TARTRATE 2 MG/MG
1 SOLUTION/ DROPS OPHTHALMIC
Refills: 0 | DISCHARGE

## 2023-06-01 RX ORDER — OXYCODONE HYDROCHLORIDE 5 MG/1
1 TABLET ORAL
Refills: 0 | DISCHARGE

## 2023-06-01 RX ORDER — SODIUM CHLORIDE 9 MG/ML
1000 INJECTION, SOLUTION INTRAVENOUS
Refills: 0 | Status: DISCONTINUED | OUTPATIENT
Start: 2023-06-01 | End: 2023-06-05

## 2023-06-01 RX ORDER — SODIUM CHLORIDE 9 MG/ML
1000 INJECTION INTRAMUSCULAR; INTRAVENOUS; SUBCUTANEOUS
Refills: 0 | Status: DISCONTINUED | OUTPATIENT
Start: 2023-06-01 | End: 2023-06-03

## 2023-06-01 RX ORDER — FLUDROCORTISONE ACETATE 0.1 MG/1
1 TABLET ORAL
Refills: 0 | DISCHARGE

## 2023-06-01 RX ORDER — FENTANYL CITRATE 50 UG/ML
1 INJECTION INTRAVENOUS
Refills: 0 | DISCHARGE

## 2023-06-01 RX ORDER — SODIUM CHLORIDE 9 MG/ML
1000 INJECTION INTRAMUSCULAR; INTRAVENOUS; SUBCUTANEOUS
Refills: 0 | Status: DISCONTINUED | OUTPATIENT
Start: 2023-06-01 | End: 2023-06-01

## 2023-06-01 RX ORDER — INSULIN GLARGINE 100 [IU]/ML
10 INJECTION, SOLUTION SUBCUTANEOUS
Refills: 0 | DISCHARGE

## 2023-06-01 RX ORDER — INSULIN LISPRO 100/ML
VIAL (ML) SUBCUTANEOUS
Refills: 0 | Status: DISCONTINUED | OUTPATIENT
Start: 2023-06-01 | End: 2023-06-05

## 2023-06-01 RX ORDER — VANCOMYCIN HCL 1 G
1000 VIAL (EA) INTRAVENOUS ONCE
Refills: 0 | Status: COMPLETED | OUTPATIENT
Start: 2023-06-01 | End: 2023-06-01

## 2023-06-01 RX ORDER — BRIMONIDINE TARTRATE 2 MG/MG
1 SOLUTION/ DROPS OPHTHALMIC THREE TIMES A DAY
Refills: 0 | Status: DISCONTINUED | OUTPATIENT
Start: 2023-06-01 | End: 2023-06-05

## 2023-06-01 RX ORDER — DORZOLAMIDE HYDROCHLORIDE 20 MG/ML
1 SOLUTION/ DROPS OPHTHALMIC
Refills: 0 | Status: DISCONTINUED | OUTPATIENT
Start: 2023-06-01 | End: 2023-06-05

## 2023-06-01 RX ORDER — DEXTROSE 50 % IN WATER 50 %
25 SYRINGE (ML) INTRAVENOUS ONCE
Refills: 0 | Status: DISCONTINUED | OUTPATIENT
Start: 2023-06-01 | End: 2023-06-05

## 2023-06-01 RX ORDER — DEXTROSE 50 % IN WATER 50 %
12.5 SYRINGE (ML) INTRAVENOUS ONCE
Refills: 0 | Status: DISCONTINUED | OUTPATIENT
Start: 2023-06-01 | End: 2023-06-05

## 2023-06-01 RX ORDER — CEFEPIME 1 G/1
1000 INJECTION, POWDER, FOR SOLUTION INTRAMUSCULAR; INTRAVENOUS ONCE
Refills: 0 | Status: COMPLETED | OUTPATIENT
Start: 2023-06-01 | End: 2023-06-01

## 2023-06-01 RX ORDER — ROSUVASTATIN CALCIUM 5 MG/1
1 TABLET ORAL
Refills: 0 | DISCHARGE

## 2023-06-01 RX ORDER — METOPROLOL TARTRATE 50 MG
1 TABLET ORAL
Refills: 0 | DISCHARGE

## 2023-06-01 RX ORDER — GABAPENTIN 400 MG/1
1 CAPSULE ORAL
Refills: 0 | DISCHARGE

## 2023-06-01 RX ORDER — HEPARIN SODIUM 5000 [USP'U]/ML
5000 INJECTION INTRAVENOUS; SUBCUTANEOUS EVERY 8 HOURS
Refills: 0 | Status: DISCONTINUED | OUTPATIENT
Start: 2023-06-01 | End: 2023-06-02

## 2023-06-01 RX ORDER — INSULIN ASPART 100 [IU]/ML
2 INJECTION, SOLUTION SUBCUTANEOUS
Refills: 0 | DISCHARGE

## 2023-06-01 RX ORDER — INSULIN LISPRO 100/ML
2 VIAL (ML) SUBCUTANEOUS
Refills: 0 | Status: DISCONTINUED | OUTPATIENT
Start: 2023-06-01 | End: 2023-06-05

## 2023-06-01 RX ADMIN — CEFEPIME 100 MILLIGRAM(S): 1 INJECTION, POWDER, FOR SOLUTION INTRAMUSCULAR; INTRAVENOUS at 15:32

## 2023-06-01 RX ADMIN — SODIUM CHLORIDE 1000 MILLILITER(S): 9 INJECTION INTRAMUSCULAR; INTRAVENOUS; SUBCUTANEOUS at 14:18

## 2023-06-01 RX ADMIN — SODIUM CHLORIDE 1000 MILLILITER(S): 9 INJECTION INTRAMUSCULAR; INTRAVENOUS; SUBCUTANEOUS at 13:10

## 2023-06-01 RX ADMIN — SODIUM CHLORIDE 125 MILLILITER(S): 9 INJECTION INTRAMUSCULAR; INTRAVENOUS; SUBCUTANEOUS at 21:32

## 2023-06-01 RX ADMIN — SODIUM CHLORIDE 1000 MILLILITER(S): 9 INJECTION INTRAMUSCULAR; INTRAVENOUS; SUBCUTANEOUS at 14:00

## 2023-06-01 RX ADMIN — ATORVASTATIN CALCIUM 40 MILLIGRAM(S): 80 TABLET, FILM COATED ORAL at 23:07

## 2023-06-01 RX ADMIN — SODIUM CHLORIDE 125 MILLILITER(S): 9 INJECTION INTRAMUSCULAR; INTRAVENOUS; SUBCUTANEOUS at 15:49

## 2023-06-01 RX ADMIN — Medication 250 MILLIGRAM(S): at 14:00

## 2023-06-01 RX ADMIN — Medication 250 MILLIGRAM(S): at 15:49

## 2023-06-01 RX ADMIN — SODIUM CHLORIDE 1000 MILLILITER(S): 9 INJECTION INTRAMUSCULAR; INTRAVENOUS; SUBCUTANEOUS at 14:29

## 2023-06-01 RX ADMIN — CEFEPIME 100 MILLIGRAM(S): 1 INJECTION, POWDER, FOR SOLUTION INTRAMUSCULAR; INTRAVENOUS at 14:05

## 2023-06-01 RX ADMIN — CEFEPIME 1000 MILLIGRAM(S): 1 INJECTION, POWDER, FOR SOLUTION INTRAMUSCULAR; INTRAVENOUS at 14:29

## 2023-06-01 NOTE — CONSULT NOTE ADULT - SUBJECTIVE AND OBJECTIVE BOX
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 Patient and wife report that he presented for planned immunotherapy today and was told his BP was in 40s-50s. He was therefore brought to the ED, where he has received 3L NS and was started on cefepime/vancomycin. Wife reports that he has been following with Heme/onc who has prescribed immunotherapy every 2 weeks for about the last year. He states that recently had discussion about stopping immunotherapy as it was "not curing tumor." He denies recent RT. He denies fevers at home. He reports that he has lost his appetite over the past 3 weeks but denies difficulty or pain with swallowing. He denies frequent epistaxis, reports most recent was 4-5 months ago. He denies recent congestion or productive cough. Per wife and patient, he has limited vision in his right eye and no vision in his left eye due to glaucoma. He denies numbness or tingling in his face.       PAST MEDICAL HISTORY:  HTN (hypertension)    Diabetes    Glaucoma    H/O gastroesophageal reflux (GERD)    Cancer of adenoid    Malignant neoplasm of nasal cavity    DM (diabetes mellitus)    Glaucoma        CURRENT MEDICATIONS   sodium chloride 0.9%. 1000 IV Continuous      HOME MEDICATIONS:  amLODIPine 5 mg oral tablet  dorzolamide-timolol 2.23%-0.68% ophthalmic solution  latanoprost 0.005% ophthalmic solution  losartan 100 mg oral tablet  omeprazole 40 mg oral delayed release capsule  piperacillin-tazobactam  timolol maleate 0.5% ophthalmic solution      ALLERGIES:  penicillin (Rash)    SOCIAL HISTORY: Pertinent included in HPI   FAMILY HISTORY  No pertinent family history in first degree relatives    Type 2 diabetes mellitus (Mother)    Hypertension (Sibling)        SURGICAL HISTORY:  History of cholecystectomy    Right cataract    S/P nasal surgery    History of nasal surgery        REVIEW OF SYSTEMS: The patient was asked and responded to a review of systems regarding the following symptoms: constitutional, eye, ears, nose, mouth, throat, cardiovascular, respiratory. Pertinent factors have been included in the HPI.       PHYSICAL EXAM:  ENT EXAM-   Constitutional:  Resting in ED bed, no hoarseness.     Head:  normocephalic, atraumatic.   Ears:  Pinna WNL.   Nose:  Septum intact. Crusting visualized in anterior nasal cavity bilaterally.  OC/OP:  Tonsils present. Floor of mouth, buccal mucosa, lips, hard palate, soft palate, uvula, posterior pharyngeal wall normal.  Mucosa moist. Absent lower third and forth molars bilaterally inferiorly.   Neck:  Trachea midline.   Lymph:  No cervical adenopathy.  Facial Plastics:   MULTISYSTEM EXAM-  Neuro/Psych:  A&O x 2.  Mood stable.  Affect bright.  Cranial nerves: 2-12 grossly intact bilaterally.   Eyes:  EOMI, no nystagmus.  Pulm:  No dyspnea, non-labored breathing on nasal cannula  Skin:  No rash or lesions on exposed skin of head/neck    LARYNGOSCOPY EXAM:     Verbal consent was obtained from patient prior to procedure.    Pre-procedure diagnosis: skull base mass  Post-procedure diagnosis: skull base mass    Flexible laryngoscopy was performed and revealed the following: crusting in anterior nasal cavities bilaterally, fullness throughout superior nasal cavities bilaterally, able to pass scope through right choana into nasopharynx. Left nasal cavity fullness also appreciated, unable to pass scope through posterior left nasal cavity. Mucosa in both nasal cavities sensate and without signs of ischemia. No areas of necrosis or purulence visualized.     The patient tolerated the procedure well.        Vital Signs:  T(C): 36.9 (23 @ 14:02), Max: 36.9 (23 @ 14:02)  HR: 106 (23 @ 15:05) (87 - 108)  BP: 121/76 (23 @ 15:05) (58/30 - 121/76)  RR: 14 (23 @ 15:05) (14 - 18)  SpO2: 98% (23 @ 14:54) (97% - 100%)                          13.2   9.82  )-----------( 209      ( 2023 13:20 )             40.3        136  |  99  |  7   ----------------------------<  165<H>  4.3   |  24  |  1.87<H>    Ca    8.0<L>      2023 13:20    TPro  5.7<L>  /  Alb  3.3  /  TBili  0.7  /  DBili  x   /  AST  38  /  ALT  28  /  AlkPhos  109  06-   PT/INR - ( 2023 13:20 )   PT: 12.8 sec;   INR: 1.07          PTT - ( 2023 13:20 )  PTT:39.9 [id number]    MICROBIOLOGY:      PATHOLOGY:                     OTOLARYNGOLOGY (ENT) CONSULTATION NOTE    PATIENT: ASAF ROACH     MRN: [id number]       : 56  DATE OF ADMISSION:23  DATE OF SERVICE:  23 @ 16:06    CHIEF COMPLAINT: hypotension    HISTORY OF PRESENT ILLNESS:  ASAF ROACH  is a 62y Male with a 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 Patient and wife report that he presented for planned immunotherapy today and was told his BP was in 40s-50s. He was therefore brought to the ED, where he has received 3L NS and was started on cefepime/vancomycin. Wife reports that he has been following with Heme/onc who has prescribed immunotherapy every 2 weeks for about the last year. He states that recently had discussion about stopping immunotherapy as it was "not curing tumor." He denies recent RT. He denies fevers at home. He reports that he has lost his appetite over the past 3 weeks but denies difficulty or pain with swallowing. He denies frequent epistaxis, reports most recent was 4-5 months ago. He denies recent congestion or productive cough. Per wife and patient, he has limited vision in his right eye and no vision in his left eye due to glaucoma. He denies numbness or tingling in his face.       PAST MEDICAL HISTORY:  HTN (hypertension)    Diabetes    Glaucoma    H/O gastroesophageal reflux (GERD)    Cancer of adenoid    Malignant neoplasm of nasal cavity    DM (diabetes mellitus)    Glaucoma        CURRENT MEDICATIONS   sodium chloride 0.9%. 1000 IV Continuous      HOME MEDICATIONS:  amLODIPine 5 mg oral tablet  dorzolamide-timolol 2.23%-0.68% ophthalmic solution  latanoprost 0.005% ophthalmic solution  losartan 100 mg oral tablet  omeprazole 40 mg oral delayed release capsule  piperacillin-tazobactam  timolol maleate 0.5% ophthalmic solution      ALLERGIES:  penicillin (Rash)    SOCIAL HISTORY: Pertinent included in HPI   FAMILY HISTORY  No pertinent family history in first degree relatives    Type 2 diabetes mellitus (Mother)    Hypertension (Sibling)        SURGICAL HISTORY:  History of cholecystectomy    Right cataract    S/P nasal surgery    History of nasal surgery        REVIEW OF SYSTEMS: The patient was asked and responded to a review of systems regarding the following symptoms: constitutional, eye, ears, nose, mouth, throat, cardiovascular, respiratory. Pertinent factors have been included in the HPI.       PHYSICAL EXAM:  ENT EXAM-   Constitutional:  Resting in ED bed, no hoarseness.     Head:  normocephalic, atraumatic.   Ears:  Pinna WNL.   Nose:  Septum intact. Crusting visualized in anterior nasal cavity bilaterally.  OC/OP:  Tonsils present. Floor of mouth, buccal mucosa, lips, hard palate, soft palate, uvula, posterior pharyngeal wall normal.  Mucosa moist. Absent lower third and forth molars bilaterally inferiorly.   Neck:  Trachea midline.   Lymph:  No cervical adenopathy.  Facial Plastics:   MULTISYSTEM EXAM-  Neuro/Psych:  A&O x 2.  Mood stable.  Affect bright.  Cranial nerves: 2-12 grossly intact bilaterally.   Eyes:  EOMI, no nystagmus.  Pulm:  No dyspnea, non-labored breathing on nasal cannula  Skin:  No rash or lesions on exposed skin of head/neck    LARYNGOSCOPY EXAM:     Verbal consent was obtained from patient prior to procedure.    Pre-procedure diagnosis: skull base mass  Post-procedure diagnosis: skull base mass    Flexible laryngoscopy was performed and revealed the following: crusting in anterior nasal cavities bilaterally, fullness throughout superior nasal cavities bilaterally, able to pass scope through left choana into nasopharynx. Right nasal cavity fullness also appreciated, unable to pass scope through posterior left nasal cavity. Mucosa in both nasal cavities sensate and without signs of ischemia. No areas of necrosis or purulence visualized.     The patient tolerated the procedure well.        Vital Signs:  T(C): 36.9 (23 @ 14:02), Max: 36.9 (23 @ 14:02)  HR: 106 (23 @ 15:05) (87 - 108)  BP: 121/76 (23 @ 15:05) (58/30 - 121/76)  RR: 14 (23 @ 15:05) (14 - 18)  SpO2: 98% (23 @ 14:54) (97% - 100%)                          13.2   9.82  )-----------( 209      ( 2023 13:20 )             40.3        136  |  99  |  7   ----------------------------<  165<H>  4.3   |  24  |  1.87<H>    Ca    8.0<L>      2023 13:20    TPro  5.7<L>  /  Alb  3.3  /  TBili  0.7  /  DBili  x   /  AST  38  /  ALT  28  /  AlkPhos  109  06-   PT/INR - ( 2023 13:20 )   PT: 12.8 sec;   INR: 1.07          PTT - ( 2023 13:20 )  PTT:39.9 [id number]    MICROBIOLOGY:      PATHOLOGY:

## 2023-06-01 NOTE — ED ADULT NURSE NOTE - CHPI ED NUR SYMPTOMS NEG
no blurred vision/no change in level of consciousness/no loss of consciousness/no nausea/no numbness/no vomiting

## 2023-06-01 NOTE — H&P ADULT - NSHPSOCIALHISTORY_GEN_ALL_CORE
Pt denies cigarette smoking  Pt had 20 year hx of EtOH abuse, daily rum. Quit 8 years ago in 2015.   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

## 2023-06-01 NOTE — PATIENT PROFILE ADULT - FUNCTIONAL ASSESSMENT - BASIC MOBILITY 6.
1-calculated by average/Not able to assess (calculate score using [facility] averaging method)

## 2023-06-01 NOTE — H&P ADULT - PROBLEM SELECTOR PLAN 6
Pt has hx of HTN for which he takes several anti-hypertensives: Amlodipine 5mg QD, Losartan 100mg QD, and Metoprolol ER 25mg QD   Plan:   - Holding antihypertensives i/s/o hypotension   - Resume when appropriate

## 2023-06-01 NOTE — H&P ADULT - PROBLEM SELECTOR PLAN 2
Pt with BUN/Cr of 7/1.87 on admission, unclear baseline. Pt's PCP is located at [facility] [phone] but was unable to be reached at time of admission --  left. Suspecting pre-renal etiology i/s/o hypotension, however cannot exclude CKD given lack of collateral   Plan:   - F/u urine lytes in AM   - Strict I/Os   - Avoid nephrotoxic agents Pt with BUN/Cr of 7/1.87 on admission, unclear baseline. Pt's PCP is located at [facility] [phone] but was unable to be reached at time of admission --  left. Suspecting pre-renal etiology i/s/o hypotension, however cannot exclude CKD given lack of collateral   Plan:   - F/u urine lytes in AM   - Strict I/Os   - Avoid nephrotoxic agents  - F/u bladder scan Pt with BUN/Cr of 7/1.87 on admission, unclear baseline. Pt's PCP is located at [facility] [phone] but was unable to be reached at time of admission --  left. Suspecting pre-renal etiology i/s/o hypotension, however cannot exclude CKD given lack of collateral   Plan:   - F/u urine lytes in AM  - Strict I/Os   - Avoid nephrotoxic agents  - F/u bladder scan

## 2023-06-01 NOTE — H&P ADULT - ATTENDING COMMENTS
62 y M w history of recurrent adenoid cystic carcinoma PW: dizziness, and low BP referred to the ED for further management     On arrival to ED VS: Afebrile, HR: , BP: 58/30--> 111/71, saturating 97% on 2 L NC   Significant labs: Scr 1.87, Ca 8, T protein 5.7  CXR personally reviewed: cardiomegaly, L costophrenic angle blunted (could be technique) no infiltrates or congestion.   Prelim read: Right chest wall Mediport catheter with tip overlying the right atrium.  Hypoinflation. The lungs are clear. Small left pleural effusion. No pneumothorax.  No acute abnormalities of the soft tissues and osseous structures.  CT H: No acute intracranial hemorrhage or demarcated transcortical infarction. Interval progression of ill-defined hyperdensity with calcifications within the debbie and right middle cerebellar peduncle and is unclear whether this may represent post therapeutic changes or tumoral disease.    Patient admitted for further management     #Hypotension   -Hold home anti-HTN   -Receiving maintenance IVF     #ROBYN   Most likely pre-renal, endorsed decreased PO intake   -Check bladder scan  -IVF as above, f/u AM Scr, f/u urine lytes     #Adenoid Ca  -ENT recs appreciated, will consult heme/onc and obtain outpatient collateral     Rest as above 62 y M w history of recurrent adenoid cystic carcinoma PW: dizziness, and low BP referred to the ED for further management     On arrival to ED VS: Afebrile, HR: , BP: 58/30--> 111/71, saturating 97% on 2 L NC   Significant labs: Scr 1.87, Ca 8, T protein 5.7  CXR personally reviewed: cardiomegaly, L costophrenic angle blunted (could be technique) no infiltrates or congestion.   Prelim read: Right chest wall Mediport catheter with tip overlying the right atrium.  Hypoinflation. The lungs are clear. Small left pleural effusion. No pneumothorax.  No acute abnormalities of the soft tissues and osseous structures.  CT H: No acute intracranial hemorrhage or demarcated transcortical infarction. Interval progression of ill-defined hyperdensity with calcifications within the debbie and right middle cerebellar peduncle and is unclear whether this may represent post therapeutic changes or tumoral disease.    Patient admitted for further management     #Hypotension   -Hold home anti-HTN   -Receiving maintenance IVF     #ROBYN   Most likely pre-renal, endorsed decreased PO intake   -Check bladder scan  -IVF as above, f/u AM Scr, f/u urine lytes     #Adenoid Ca  -ENT recs appreciated, will consult heme/onc and obtain outpatient collateral     #Elevated troponin  EKG nonischemic, patient denies CP  -Trend till peak     Rest as above 62 y M w history of recurrent adenoid cystic carcinoma PW: dizziness, and low BP referred to the ED for further management     On arrival to ED VS: Afebrile, HR: , BP: 58/30--> 111/71, saturating 97% on 2 L NC   Significant labs: Scr 1.87, Ca 8, T protein 5.7  CXR personally reviewed: cardiomegaly, L costophrenic angle blunted (could be technique) no infiltrates or congestion.   Prelim read: Right chest wall Mediport catheter with tip overlying the right atrium.  Hypoinflation. The lungs are clear. Small left pleural effusion. No pneumothorax.  No acute abnormalities of the soft tissues and osseous structures.  CT H: No acute intracranial hemorrhage or demarcated transcortical infarction. Interval progression of ill-defined hyperdensity with calcifications within the debbie and right middle cerebellar peduncle and is unclear whether this may represent post therapeutic changes or tumoral disease.    Patient admitted for further management     #Hypotension   -Hold home anti-HTN   -Receiving maintenance IVF   -Patient received abx in ED however CXR w no infiltrate, UA negative, no obvious infectious source    #ROBYN   Most likely pre-renal, endorsed decreased PO intake   -Check bladder scan  -IVF as above, f/u AM Scr, f/u urine lytes     #Adenoid Ca  -ENT recs appreciated, will consult heme/onc and obtain outpatient collateral     #Elevated troponin  EKG nonischemic, patient denies CP  -Trend till peak     Rest as above

## 2023-06-01 NOTE — H&P ADULT - NSHPPHYSICALEXAM_GEN_ALL_CORE
General: Alert and oriented x 1-2, somnolent. No acute distress. Obese body habitus.   Eyes: EOMI. Anicteric.  HEENT: Moist mucous membranes. Mallampati Class III. No scleral icterus. No cervical lymphadenopathy.  Lungs: Clear to auscultation bilaterally. No accessory muscle use.  Cardiovascular: Regular rate and rhythm. No murmur. No JVD.  Abdomen: Soft, non-tender, + distended. No palpable masses.  Extremities: No edema. Non-tender.  Skin: No rashes or lesions. Warm.  Neurologic: No focal neurological deficits. CN II-XII grossly intact, but not individually tested.  Psychiatric: Somnolent General: Alert and oriented x 3 (aware of person, place, time), + somnolent. No acute distress. Obese body habitus.   Eyes: EOMI. Anicteric.  HEENT: Moist mucous membranes. Mallampati Class III. No scleral icterus. No cervical lymphadenopathy.  Lungs: Clear to auscultation bilaterally. No accessory muscle use.  Cardiovascular: Regular rate and rhythm. No murmur. No JVD.  Abdomen: Soft, non-tender, + distended. No palpable masses.  Extremities: No edema. Non-tender.  Skin: No rashes or lesions. Warm.  Neurologic: No focal neurological deficits. CN II-XII grossly intact, but not individually tested.  Psychiatric: Somnolent

## 2023-06-01 NOTE — H&P ADULT - NSHPLABSRESULTS_GEN_ALL_CORE
.  LABS:                         13.2   9.82  )-----------( 209      ( 2023 13:20 )             40.3         136  |  99  |  7   ----------------------------<  165<H>  4.3   |  24  |  1.87<H>    Ca    8.0<L>      2023 13:20    TPro  5.7<L>  /  Alb  3.3  /  TBili  0.7  /  DBili  x   /  AST  38  /  ALT  28  /  AlkPhos  109      PT/INR - ( 2023 13:20 )   PT: 12.8 sec;   INR: 1.07          PTT - ( 2023 13:20 )  PTT:39.9 sec  Urinalysis Basic - ( 2023 14:51 )    Color: Yellow / Appearance: Clear / S.010 / pH: x  Gluc: x / Ketone: NEGATIVE  / Bili: Negative / Urobili: 0.2 E.U./dL   Blood: x / Protein: NEGATIVE mg/dL / Nitrite: NEGATIVE   Leuk Esterase: NEGATIVE / RBC: x / WBC x   Sq Epi: x / Non Sq Epi: x / Bacteria: x      CARDIAC MARKERS ( 2023 13:20 )  x     / 0.03 ng/mL / x     / x     / x            Lactate, Blood: 1.7 mmol/L ( @ 13:20)      RADIOLOGY, EKG & ADDITIONAL TESTS: Reviewed.

## 2023-06-01 NOTE — ED ADULT NURSE NOTE - NSFALLHARMRISKINTERV_ED_ALL_ED

## 2023-06-01 NOTE — H&P ADULT - PROBLEM SELECTOR PLAN 1
Pt presented with initial BP of 58/30 with HR of 90, which transiently reached 108 in ED. Pt not meeting SIRS criteria, as pt had WBC of 9.82, RR 18, and afebrile, however pt meeting qSofa for AMS and low systolic BP. Possible etiologies include sepsis, hemorrhagic shock (though less likely given lack of bleeding). Pt s/p Vancomycin 2g and Cefepime 2g in ED. UA negative. CXR showed no pneumonias.   Plan:   - F/u blood cxs x2 Pt presented with initial BP of 58/30 with HR of 90, which transiently reached 108 in ED. Pt not meeting SIRS criteria, as pt had WBC of 9.82, RR 18, and afebrile, however pt meeting qSofa for AMS and low systolic BP. Possible etiologies include sepsis, hemorrhagic shock (though less likely given lack of bleeding). Pt s/p Vancomycin 2g and Cefepime 2g in ED. UA negative. CXR showed no pneumonias.   Plan:   - F/u blood cxs x2  - Given immunocompromised state, will c/w Vancomycin Pt presented with initial BP of 58/30 with HR of 90, which transiently reached 108 in ED. Pt not meeting SIRS criteria, as pt had WBC of 9.82, RR 18, and afebrile, however pt meeting qSofa for AMS and low systolic BP. Possible etiologies include sepsis, hemorrhagic shock (though less likely given lack of bleeding). Pt s/p Vancomycin 2g and Cefepime 2g in ED. UA negative. CXR showed no pneumonias.   Plan:   - F/u blood cxs x2  - Trend troponin in AM   - Will d/c abx given lack of infectious source Pt presented with initial BP of 58/30 with HR of 90, which transiently reached 108 in ED. Pt not meeting SIRS criteria, as pt had WBC of 9.82, RR 18, and afebrile, however pt meeting qSofa for AMS (somnolence) and low systolic BP. Possible etiologies include sepsis, hemorrhagic shock (though less likely given lack of bleeding), medication induced hypotension. Pt s/p Vancomycin 2g and Cefepime 2g in ED. UA negative. CXR showed no pneumonias.   Plan:   - F/u blood cxs x2  - Trend troponin in AM, pt denied CP in ED x2, EKG NSR.  - Will d/c abx given lack of infectious source  - Continue vital checks q4h until stabilized Pt presented with initial BP of 58/30 with HR of 90, which transiently reached 108 in ED. Pt not meeting SIRS criteria, as pt had WBC of 9.82, RR 18, and afebrile, however pt meeting qSofa for AMS (somnolence) and low systolic BP. Possible etiologies include sepsis, hemorrhagic shock (though less likely given lack of bleeding), medication induced hypotension. Pt s/p Vancomycin 2g and Cefepime 2g in ED. UA negative. CXR showed no pneumonias.   Plan:   - F/u blood cxs x2  - Trend troponin in AM, pt denied CP in ED x2, EKG NSR.  - Will d/c abx given lack of infectious source  - Continue vital checks q4h until stabilized  - NS @ 125mL/hr from [id number] with monitoring of BPs

## 2023-06-01 NOTE — CONSULT NOTE ADULT - SUBJECTIVE AND OBJECTIVE BOX
**STROKE CODE CONSULT NOTE**    Last known well time: ~1100    HPI: 62y Male with PMHx of adenoid CA currently undergoing radiation, HTN, glaucoma, GERD, DM, cataracts presents to [facility] ED for episode of decreased responsiveness, pallor, and lethargy with hypotension to sBPs in 50s. On arrival, patient unarousable to noxious, stroke code called for AMS and concern for hemorrhage. NIHSS 1 mild lethargy, although difficult to assess as patient is unable to open his eyes (chronic). Nonfocal neurologic exam. Patient hypotensive to systolics in 80s/60s per manual BP reading, decreased radial pulses bilaterally. After fluid blous patient appeared to be more responsive. Per family, they were concerned because he looked more pale and pt stated he felt generally weak. Of note, per family pt has been getting progressively weak and has had decreased PO intake. CTH negative for acute intracranial pathology, CTP/CTA H/N deferred due to low concern for ischemic event. Patient denies headache, difficulty speaking, weakness/numbness.     T(C): 36.6 (23 @ 18:09), Max: 36.9 (23 @ 14:02)  HR: 88 (23 @ 18:09) (87 - 108)  BP: 111/71 (23 @ 18:09) (58/30 - 121/76)  RR: 17 (23 @ 18:09) (14 - 18)  SpO2: 94% (23 @ 18:09) (94% - 100%)    PAST MEDICAL & SURGICAL HISTORY:  HTN (hypertension)      Glaucoma      H/O gastroesophageal reflux (GERD)      Cancer of adenoid  chemo & RT      Malignant neoplasm of nasal cavity      DM (diabetes mellitus)  type 2, not on meds      History of cholecystectomy      Right cataract      History of nasal surgery  biopsy          FAMILY HISTORY:  Type 2 diabetes mellitus (Mother)  Mother had T2DM.    Hypertension (Sibling)  Has brother with arterial hypertension.        ROS:   Per HPI    MEDICATIONS  (STANDING):  atorvastatin 40 milliGRAM(s) Oral at bedtime  brimonidine 0.2% Ophthalmic Solution 1 Drop(s) Both EYES three times a day  dextrose 5%. 1000 milliLiter(s) (50 mL/Hr) IV Continuous <Continuous>  dextrose 5%. 1000 milliLiter(s) (100 mL/Hr) IV Continuous <Continuous>  dextrose 50% Injectable 25 Gram(s) IV Push once  dextrose 50% Injectable 25 Gram(s) IV Push once  dextrose 50% Injectable 12.5 Gram(s) IV Push once  dorzolamide 2% Ophthalmic Solution 1 Drop(s) Both EYES <User Schedule>  glucagon  Injectable 1 milliGRAM(s) IntraMuscular once  heparin   Injectable 5000 Unit(s) SubCutaneous every 8 hours  insulin glargine Injectable (LANTUS) 7 Unit(s) SubCutaneous at bedtime  insulin lispro (ADMELOG) corrective regimen sliding scale   SubCutaneous three times a day before meals  insulin lispro Injectable (ADMELOG) 2 Unit(s) SubCutaneous three times a day before meals  latanoprost 0.005% Ophthalmic Solution 1 Drop(s) Both EYES at bedtime  sodium chloride 0.9%. 1000 milliLiter(s) (125 mL/Hr) IV Continuous <Continuous>    MEDICATIONS  (PRN):  dextrose Oral Gel 15 Gram(s) Oral once PRN Blood Glucose LESS THAN 70 milliGRAM(s)/deciliter    Allergies    penicillin (Rash)    Intolerances      Vital Signs Last 24 Hrs  T(C): 36.6 (2023 18:09), Max: 36.9 (2023 14:02)  T(F): 97.8 (2023 18:09), Max: 98.4 (2023 14:02)  HR: 88 (2023 18:09) (87 - 108)  BP: 111/71 (2023 18:09) (58/30 - 121/76)  BP(mean): --  RR: 17 (2023 18:09) (14 - 18)  SpO2: 94% (2023 18:09) (94% - 100%)    Parameters below as of 2023 18:09  Patient On (Oxygen Delivery Method): room air        Physical exam:  Constitutional: No acute distress, conversant    Neurologic:  -Mental status: Eyes closed, crusted over (Baseline), alert, oriented to person, place, and time. Speech is fluent with intact naming, repetition, and comprehension, no dysarthria. Recent and remote memory intact. Follows commands. Attention/concentration intact. Fund of knowledge appropriate.  -Cranial nerves:   II: Unable to assess   III, IV, VI: Unable to assess   V:  Facial sensation V1-V3 equal and intact   VII: Face is symmetric with normal eye closure and smile  VIII: Hearing is bilaterally intact to finger rub  XII: Tongue protrudes midline  Motor: Normal bulk and tone. No pronator drift. Strength bilateral upper extremity 4+/5, bilateral lower extremities 4+/5.  Rapid alternating movements intact and symmetric  Sensation: Intact to light touch bilaterally. No neglect or extinction on double simultaneous testing.  Coordination: No dysmetria on finger-to-nose and heel-to-shin bilaterally  Reflexes: Downgoing toes bilaterally   Gait: deferred    NIHSS: 1     Fingerstick Blood Glucose: CAPILLARY BLOOD GLUCOSE  170 (2023 14:12)      POCT Blood Glucose.: 170 mg/dL (2023 13:18)    LABS:                        13.2   9.82  )-----------( 209      ( 2023 13:20 )             40.3     06-    136  |  99  |  7   ----------------------------<  165<H>  4.3   |  24  |  1.87<H>    Ca    8.0<L>      2023 13:20    TPro  5.7<L>  /  Alb  3.3  /  TBili  0.7  /  DBili  x   /  AST  38  /  ALT  28  /  AlkPhos  109  06-01    PT/INR - ( 2023 13:20 )   PT: 12.8 sec;   INR: 1.07          PTT - ( 2023 13:20 )  PTT:39.9 sec  CARDIAC MARKERS ( 2023 13:20 )  x     / 0.03 ng/mL / x     / x     / x          Urinalysis Basic - ( 2023 14:51 )    Color: Yellow / Appearance: Clear / S.010 / pH: x  Gluc: x / Ketone: NEGATIVE  / Bili: Negative / Urobili: 0.2 E.U./dL   Blood: x / Protein: NEGATIVE mg/dL / Nitrite: NEGATIVE   Leuk Esterase: NEGATIVE / RBC: x / WBC x   Sq Epi: x / Non Sq Epi: x / Bacteria: x    RADIOLOGY & ADDITIONAL STUDIES:    < from: CT Brain Stroke Protocol (23 @ 13:43) >  IMPRESSION: No acute intracranial hemorrhage or demarcated transcortical   infarction. Interval progression of ill-defined hyperdensity with   calcifications within the debbie and right middle cerebellar peduncle and   is unclear whether this may represent post therapeutic changes or tumoral   disease.    < end of copied text >    -----------------------------------------------------------------------------------------------------------------  IV-tPA (Y/N): N                          Bolus time:    Tenecteplase Dose Verification w/ RN:  Reason IV-tPA not given: nonfocal exam, low concern for ischemic event

## 2023-06-01 NOTE — H&P ADULT - PROBLEM SELECTOR PLAN 7
F: S/p 3L in ED  E: Replete PRN K>3.5, Mg > 2   GI: Omeprazole 40mg at home   Diet: Consistent Carb   DVT: Heparin SubQ   Code: Full code F: S/p 3L in ED, NS gtt   E: Replete PRN K>3.5, Mg > 2   GI: Omeprazole 40mg at home   Diet: Consistent Carb   DVT: Heparin SubQ   Code: Full code

## 2023-06-01 NOTE — ED PROVIDER NOTE - PHYSICAL EXAMINATION
VITAL SIGNS: I have reviewed nursing notes and confirm.  CONSTITUTIONAL: Obese. Unwell appearing; in no acute distress.  SKIN: Agree with RN documentation regarding decubitus evaluation. Remainder of skin exam is warm and dry, no acute rash.  HEAD: Normocephalic; atraumatic.  EYES:  Pt's eyes are closed at baseline. left eye with conjunctivitis and cataract.   ENT: No nasal discharge; airway clear.  NECK: Supple; non tender.  CARD: S1, S2 normal; no murmurs, gallops, or rubs. Regular rate and rhythm.  RESP: No wheezes, rales or rhonchi.  ABD: Normal bowel sounds; soft; non-distended; non-tender; no hepatosplenomegaly.  EXT: Normal ROM. No clubbing, cyanosis or edema.  LYMPH: No acute cervical adenopathy.  NEURO: Initial difficulty responding, pt needed physical and verbal stimulation. After a short time pt became more aroused and was able to follow commands slowly. Pt moving all extremities with equal strength bilaterally.  Alert, oriented. Grossly unremarkable.  PSYCH: Cooperative, appropriate. VITAL SIGNS: I have reviewed nursing notes and confirm.  CONSTITUTIONAL: Obese. Unwell appearing,   SKIN: cool, pale   HEAD: Normocephalic; atraumatic.  EYES:  Pt's eyes are closed at baseline. left eye with conjunctivitis and cataract.   ENT: No nasal discharge; airway clear.  NECK: Supple; non tender.  CARD: S1, S2 normal; no murmurs, gallops, or rubs. Regular rate and rhythm.  RESP: No wheezes, rales or rhonchi.  ABD: Normal bowel sounds; soft; non-distended; non-tender; no hepatosplenomegaly.  EXT: Normal ROM. No clubbing, cyanosis or edema.  LYMPH: No acute cervical adenopathy.  NEURO: Initial difficulty responding, pt needed physical and verbal stimulation. After a short time pt became more aroused and was able to follow commands slowly with encouragement and w no focal deficits apparent . Pt moving all extremities with equal strength bilaterally.  Alert, oriented. Grossly unremarkable. coordination poor / movements slow

## 2023-06-01 NOTE — H&P ADULT - PROBLEM SELECTOR PLAN 4
Pt receives Dosolimide 2% BID, Brimonidine 0.15% TID, and Latoprost eyedrops at home   Plan:   - C/w home meds Pt receives Dosolimide 2% BID, Brimonidine 0.15% TID, and Latoprost eyedrops at home. Pt reportedly blind vs visually impaired as per spouse.   Plan:   - C/w home meds

## 2023-06-01 NOTE — ED ADULT NURSE NOTE - OBJECTIVE STATEMENT
Pt arrived via ems. Per family and report, pt was found to be hypotensive and increased lethargy post radiotherapy. ems was called and brought to ER. Pt presented in ashen color, cool to palpation, weak pulses, inability to open eyes, but responsive. pt had hypotensive bp, getting bolus of fluids given by EMS and ER NS bolus. pt is baseline unable to open eyes. per family members, pt has been getting weaker as radiotherapies progress. pt has been declining reportedly and getting weaker and unable to get out with bed or ambulate without assistance. pt able to communicate and speak without slurring of speech. pt A&Ox4 and able to move all extremities. pt pupils reactive. code stroke was initiate. pt was difficult to get access on requiring multiple attempts. pt has a double chamber portacath in RU chest region but per family member "it is clogged and they were unable to use it earlier". pt taken to CT and PA Stroke team member assessing patient. pt became responsive to fluids and normotensive. pt still lethargic but per family, this is pt new baseline. pt is not vomiting, denies nausea, cp, sob. pt was placed on nonrebreather by EMS but evaluated to be 96% on RA w/ occasional cough, placed on 2L NC for comfort. pt breathing even and unlabored with equal chest rise and fall. pt does not have abd breathing or muscles accessory use at this time. pt continent of both bowels and urine. able to turn. dizziness and lightheadedness present at this time. rectal temp normal. no change in LOC. eyes remain closed w/ notable crusting on eyelids. pt HR in high 90s but SR on EKG.

## 2023-06-01 NOTE — ED PROVIDER NOTE - OBJECTIVE STATEMENT
61 y/o M with a PMHx of Brain CA (currently undergoing radiation, HTN, glaucoma, GERD, DM, catracts, and adenoid CA was brought to ED for concerns by family. As per family pt has been progressively weakening and bedbound with a decreased PO intake. During radiation today, pt suddenly became decreasingly responsive and lethargic with pale skin and a BP in the 50s en route here. 63 y/o M with a PMHx of adenoid CA currently undergoing radiation, HTN, glaucoma, GERD, DM, cataracts, As per family pt has been progressively weakening and bedbound with a decreased PO intake.   During radiation today, pt suddenly became decreasingly responsive and lethargic with pale skin and a BP in the 50s en route here.

## 2023-06-01 NOTE — H&P ADULT - PROBLEM SELECTOR PLAN 5
Pt's wife states pt takes only Metformin at home. Collateral obtained with [facility] states pt is also on Basaglar Insulin 10U at bedtime, Novolog Flexpen 2U TID, in addition to Metformin 500mg QD.   Plan:   - mISS with consistent carb diet   - C/w lantus 7U at bedtime   - C/w admelog 2U premeal TID   - F/u A1C in AM

## 2023-06-01 NOTE — ED PROVIDER NOTE - CLINICAL SUMMARY MEDICAL DECISION MAKING FREE TEXT BOX
63 y/o M with a PMHx of HTN and brain CA here after decreased responsiveness after brain radiation today. Concern for possible hemorraghic stroke. Consider sepsis. Considered anemia but hemoglobin is ok. Empirically given antibiotics and fluids. Consider failure to thrive due to decreased PO intake. Dispo pending work up and reevaluation. 61 y/o M with a PMHx of HTN and brain CA here after decreased responsiveness after radiation today. Concern for possible hemorraghic stroke. Consider sepsis. Considered anemia but hemoglobin is ok. Empirically given antibiotics and fluids. Consider failure to thrive due to decreased PO intake. Dispo pending work up and reevaluation.    F/u 3 pm  : will page ENT , chart review , pt w ho of necrotic tumor of skull base, possible infection source ? will increase antibiotic dosing, UA pending. 3rd Liter going at this time w BP in 90's

## 2023-06-01 NOTE — H&P ADULT - HISTORY OF PRESENT ILLNESS
Mr. Robles is a 61 y/o [place]ambican speaking M with a PMHx of adenoid CA currently undergoing radiation, HTN, glaucoma, GERD, DM, cataracts. History obtained in ED from spouse Jennifer Gould [phone] using  services as pt somnolent. Pt was in his usual state of health today undergoing "immunization & radiotherapy" with "Dr. Jeff Guo" when he suddenly became dizzy and somnolent. Pt had reportedly had very low blood pressure for which EMS was called. Pt has never had these sx prior. On questioning, pt able to answer some questions alongside spouse, denies SOB and cough, palpitations and CP, headaches, fevers, chills, nausea, vomiting, diarrhea, constipation, dysuria, hematuria, hematochezia.     Surgical Hx: Pt has surgical history of gallbladder removal "many years ago" but otherwise denies surgeries.   Medication List (obtained through [facility]; [address] [facility]): Losartan 100mg qd, Amlodipine 5mg qd, Metoprolol ER 25mg QD, Metformin 500mg QD, Omeprazole 40mg qd, Prednisone 5mg QD, Fludrocortisone 0.1mg QD, Mirtazapine 7.5mg QD, Gabapentin 100mg TID, Fentanyl patch 25mcg qd PRN, Oxycodone 15mg IR Q8h, Rosuvastatin 10mg QD, Novolog Flexpen 2U TID, Insulin Basaglar 10U at bedtime, glaucoma eyedrops: Dorzolamide 2% BID, Brimonidine 0.15% TID, Lantoprost QD    Social Hx: See below     ED Course:  ED Vitals: Initial vitals: T: 98.4, HR: 90, BPL 58/30, satting 100% on non-rebreather   Pertinent labs: WBC: 9.82, Hgb/Hct: 13.2, 40.3, platelets 209; Na 136, K 4.3, Cl 99, CO2 24, BUN/Cr 7/1.87, Glu 165, Troponin 0.03   EKG: NSR with no ischemic changes  CXR: Right chest wall Mediport catheter with tip overlying the right atrium with hypoinflation and clear lungs. Small left pleural effusion. No pneumothorax.  CT Brain Stroke: No acute intracranial hemorrhage or demarcated transcortical infarction. Interval progression of ill-defined hyperdensity with calcifications within the debbie and right middle cerebellar peduncle and is unclear whether this may represent post therapeutic changes or tumoral disease.  Interventions: Pt given Cefepime 1g IVP x2, Vancomycin 1g IVP x2, 1L NS bolus x3, NS maintenance fluids @ 125mL/hr   Pt admitted to [facility] for distributive shock presumed to be from sepsis in the setting of chemo

## 2023-06-01 NOTE — H&P ADULT - PROBLEM SELECTOR PLAN 3
Pt states he has cancer of adenoids and sees Dr. Raymundo Guo, unable to find Oncologist in [place].   Plan:   - Pt states he has cancer of adenoids and sees Dr. Raymundo Guo, unable to find Oncologist in [facility]. Pt's spouse unsure of providers full name.   Plan:   - Collateral in AM Pt states he has cancer of adenoids and sees Dr. Raymundo Guo, unable to find Oncologist in [facility]. Pt's spouse unsure of providers full name.   Plan:   - Collateral in AM with PCP [phone],  left   - Consider Heme/Onc consult in AM as per ENT recs

## 2023-06-01 NOTE — H&P ADULT - ASSESSMENT
Mr. Robles is a 61 y/o Icelandic speaking M with a PMHx of adenoid CA currently undergoing radiation, HTN, glaucoma, GERD, DM, cataracts who presented with hypotension during radiation.

## 2023-06-01 NOTE — PATIENT PROFILE ADULT - FALL HARM RISK - HARM RISK INTERVENTIONS
Assistance with ambulation/Assistance OOB with selected safe patient handling equipment/Communicate Risk of Fall with Harm to all staff/Monitor gait and stability/Reinforce activity limits and safety measures with patient and family/Sit up slowly, dangle for a short time, stand at bedside before walking/Tailored Fall Risk Interventions/Visual Cue: Yellow wristband and red socks/Bed in lowest position, wheels locked, appropriate side rails in place/Call bell, personal items and telephone in reach/Instruct patient to call for assistance before getting out of bed or chair/Non-slip footwear when patient is out of bed/[place] to call system/Physically safe environment - no spills, clutter or unnecessary equipment/Purposeful Proactive Rounding/Room/bathroom lighting operational, light cord in reach

## 2023-06-02 DIAGNOSIS — R41.82 ALTERED MENTAL STATUS, UNSPECIFIED: ICD-10-CM

## 2023-06-02 LAB
A1C WITH ESTIMATED AVERAGE GLUCOSE RESULT: 9.7 % — HIGH (ref 4–5.6)
ALBUMIN SERPL ELPH-MCNC: 2.9 G/DL — LOW (ref 3.3–5)
ALBUMIN SERPL ELPH-MCNC: 3 G/DL — LOW (ref 3.3–5)
ALBUMIN SERPL ELPH-MCNC: 3.2 G/DL — LOW (ref 3.3–5)
ALP SERPL-CCNC: 110 U/L — SIGNIFICANT CHANGE UP (ref 40–120)
ALP SERPL-CCNC: 91 U/L — SIGNIFICANT CHANGE UP (ref 40–120)
ALP SERPL-CCNC: 96 U/L — SIGNIFICANT CHANGE UP (ref 40–120)
ALT FLD-CCNC: 23 U/L — SIGNIFICANT CHANGE UP (ref 10–45)
ALT FLD-CCNC: 23 U/L — SIGNIFICANT CHANGE UP (ref 10–45)
ALT FLD-CCNC: 28 U/L — SIGNIFICANT CHANGE UP (ref 10–45)
ANION GAP SERPL CALC-SCNC: 10 MMOL/L — SIGNIFICANT CHANGE UP (ref 5–17)
ANION GAP SERPL CALC-SCNC: 15 MMOL/L — SIGNIFICANT CHANGE UP (ref 5–17)
ANION GAP SERPL CALC-SCNC: 15 MMOL/L — SIGNIFICANT CHANGE UP (ref 5–17)
AST SERPL-CCNC: 25 U/L — SIGNIFICANT CHANGE UP (ref 10–40)
AST SERPL-CCNC: 27 U/L — SIGNIFICANT CHANGE UP (ref 10–40)
AST SERPL-CCNC: 28 U/L — SIGNIFICANT CHANGE UP (ref 10–40)
B-OH-BUTYR SERPL-SCNC: 2.6 MMOL/L — HIGH
BASE EXCESS BLDA CALC-SCNC: -4.5 MMOL/L — LOW (ref -2–3)
BASOPHILS # BLD AUTO: 0.01 K/UL — SIGNIFICANT CHANGE UP (ref 0–0.2)
BASOPHILS NFR BLD AUTO: 0.1 % — SIGNIFICANT CHANGE UP (ref 0–2)
BILIRUB SERPL-MCNC: 0.4 MG/DL — SIGNIFICANT CHANGE UP (ref 0.2–1.2)
BILIRUB SERPL-MCNC: 0.4 MG/DL — SIGNIFICANT CHANGE UP (ref 0.2–1.2)
BILIRUB SERPL-MCNC: 0.5 MG/DL — SIGNIFICANT CHANGE UP (ref 0.2–1.2)
BLD GP AB SCN SERPL QL: NEGATIVE — SIGNIFICANT CHANGE UP
BUN SERPL-MCNC: 10 MG/DL — SIGNIFICANT CHANGE UP (ref 7–23)
BUN SERPL-MCNC: 11 MG/DL — SIGNIFICANT CHANGE UP (ref 7–23)
BUN SERPL-MCNC: 9 MG/DL — SIGNIFICANT CHANGE UP (ref 7–23)
CALCIUM SERPL-MCNC: 7.3 MG/DL — LOW (ref 8.4–10.5)
CALCIUM SERPL-MCNC: 7.5 MG/DL — LOW (ref 8.4–10.5)
CALCIUM SERPL-MCNC: 8.4 MG/DL — SIGNIFICANT CHANGE UP (ref 8.4–10.5)
CHLORIDE SERPL-SCNC: 103 MMOL/L — SIGNIFICANT CHANGE UP (ref 96–108)
CHLORIDE SERPL-SCNC: 105 MMOL/L — SIGNIFICANT CHANGE UP (ref 96–108)
CHLORIDE SERPL-SCNC: 106 MMOL/L — SIGNIFICANT CHANGE UP (ref 96–108)
CO2 BLDA-SCNC: 20 MMOL/L — SIGNIFICANT CHANGE UP (ref 19–24)
CO2 SERPL-SCNC: 18 MMOL/L — LOW (ref 22–31)
CO2 SERPL-SCNC: 18 MMOL/L — LOW (ref 22–31)
CO2 SERPL-SCNC: 21 MMOL/L — LOW (ref 22–31)
CREAT SERPL-MCNC: 1.32 MG/DL — HIGH (ref 0.5–1.3)
CREAT SERPL-MCNC: 1.34 MG/DL — HIGH (ref 0.5–1.3)
CREAT SERPL-MCNC: 1.36 MG/DL — HIGH (ref 0.5–1.3)
EGFR: 58 ML/MIN/1.73M2 — LOW
EGFR: 59 ML/MIN/1.73M2 — LOW
EGFR: 60 ML/MIN/1.73M2 — SIGNIFICANT CHANGE UP
EOSINOPHIL # BLD AUTO: 0 K/UL — SIGNIFICANT CHANGE UP (ref 0–0.5)
EOSINOPHIL NFR BLD AUTO: 0 % — SIGNIFICANT CHANGE UP (ref 0–6)
ESTIMATED AVERAGE GLUCOSE: 232 MG/DL — HIGH (ref 68–114)
GLUCOSE BLDC GLUCOMTR-MCNC: 234 MG/DL — HIGH (ref 70–99)
GLUCOSE BLDC GLUCOMTR-MCNC: 238 MG/DL — HIGH (ref 70–99)
GLUCOSE BLDC GLUCOMTR-MCNC: 248 MG/DL — HIGH (ref 70–99)
GLUCOSE BLDC GLUCOMTR-MCNC: 305 MG/DL — HIGH (ref 70–99)
GLUCOSE BLDC GLUCOMTR-MCNC: 332 MG/DL — HIGH (ref 70–99)
GLUCOSE SERPL-MCNC: 268 MG/DL — HIGH (ref 70–99)
GLUCOSE SERPL-MCNC: 276 MG/DL — HIGH (ref 70–99)
GLUCOSE SERPL-MCNC: 368 MG/DL — HIGH (ref 70–99)
HCO3 BLDA-SCNC: 19 MMOL/L — LOW (ref 21–28)
HCT VFR BLD CALC: 38.6 % — LOW (ref 39–50)
HGB BLD-MCNC: 12.5 G/DL — LOW (ref 13–17)
IMM GRANULOCYTES NFR BLD AUTO: 0.7 % — SIGNIFICANT CHANGE UP (ref 0–0.9)
LACTATE SERPL-SCNC: 3 MMOL/L — HIGH (ref 0.5–2)
LYMPHOCYTES # BLD AUTO: 3.83 K/UL — HIGH (ref 1–3.3)
LYMPHOCYTES # BLD AUTO: 30.1 % — SIGNIFICANT CHANGE UP (ref 13–44)
MAGNESIUM SERPL-MCNC: 1.4 MG/DL — LOW (ref 1.6–2.6)
MCHC RBC-ENTMCNC: 31.2 PG — SIGNIFICANT CHANGE UP (ref 27–34)
MCHC RBC-ENTMCNC: 32.4 GM/DL — SIGNIFICANT CHANGE UP (ref 32–36)
MCV RBC AUTO: 96.3 FL — SIGNIFICANT CHANGE UP (ref 80–100)
MONOCYTES # BLD AUTO: 0.13 K/UL — SIGNIFICANT CHANGE UP (ref 0–0.9)
MONOCYTES NFR BLD AUTO: 1 % — LOW (ref 2–14)
NEUTROPHILS # BLD AUTO: 8.66 K/UL — HIGH (ref 1.8–7.4)
NEUTROPHILS NFR BLD AUTO: 68.1 % — SIGNIFICANT CHANGE UP (ref 43–77)
NRBC # BLD: 0 /100 WBCS — SIGNIFICANT CHANGE UP (ref 0–0)
PCO2 BLDA: 31 MMHG — LOW (ref 35–48)
PH BLDA: 7.4 — SIGNIFICANT CHANGE UP (ref 7.35–7.45)
PHOSPHATE SERPL-MCNC: 2.6 MG/DL — SIGNIFICANT CHANGE UP (ref 2.5–4.5)
PLATELET # BLD AUTO: 190 K/UL — SIGNIFICANT CHANGE UP (ref 150–400)
PO2 BLDA: 74 MMHG — LOW (ref 83–108)
POTASSIUM SERPL-MCNC: 4.5 MMOL/L — SIGNIFICANT CHANGE UP (ref 3.5–5.3)
POTASSIUM SERPL-MCNC: 4.7 MMOL/L — SIGNIFICANT CHANGE UP (ref 3.5–5.3)
POTASSIUM SERPL-MCNC: 4.8 MMOL/L — SIGNIFICANT CHANGE UP (ref 3.5–5.3)
POTASSIUM SERPL-SCNC: 4.5 MMOL/L — SIGNIFICANT CHANGE UP (ref 3.5–5.3)
POTASSIUM SERPL-SCNC: 4.7 MMOL/L — SIGNIFICANT CHANGE UP (ref 3.5–5.3)
POTASSIUM SERPL-SCNC: 4.8 MMOL/L — SIGNIFICANT CHANGE UP (ref 3.5–5.3)
PROT SERPL-MCNC: 5.2 G/DL — LOW (ref 6–8.3)
PROT SERPL-MCNC: 5.2 G/DL — LOW (ref 6–8.3)
PROT SERPL-MCNC: 6.3 G/DL — SIGNIFICANT CHANGE UP (ref 6–8.3)
RBC # BLD: 4.01 M/UL — LOW (ref 4.2–5.8)
RBC # FLD: 14.6 % — HIGH (ref 10.3–14.5)
RH IG SCN BLD-IMP: POSITIVE — SIGNIFICANT CHANGE UP
SAO2 % BLDA: 97.6 % — SIGNIFICANT CHANGE UP (ref 94–98)
SODIUM SERPL-SCNC: 134 MMOL/L — LOW (ref 135–145)
SODIUM SERPL-SCNC: 138 MMOL/L — SIGNIFICANT CHANGE UP (ref 135–145)
SODIUM SERPL-SCNC: 139 MMOL/L — SIGNIFICANT CHANGE UP (ref 135–145)
TROPONIN T SERPL-MCNC: 0.01 NG/ML — SIGNIFICANT CHANGE UP (ref 0–0.01)
WBC # BLD: 12.72 K/UL — HIGH (ref 3.8–10.5)
WBC # FLD AUTO: 12.72 K/UL — HIGH (ref 3.8–10.5)

## 2023-06-02 PROCEDURE — 99223 1ST HOSP IP/OBS HIGH 75: CPT

## 2023-06-02 RX ORDER — HEPARIN SODIUM 5000 [USP'U]/ML
7500 INJECTION INTRAVENOUS; SUBCUTANEOUS EVERY 8 HOURS
Refills: 0 | Status: DISCONTINUED | OUTPATIENT
Start: 2023-06-02 | End: 2023-06-05

## 2023-06-02 RX ORDER — MAGNESIUM SULFATE 500 MG/ML
4 VIAL (ML) INJECTION ONCE
Refills: 0 | Status: DISCONTINUED | OUTPATIENT
Start: 2023-06-02 | End: 2023-06-05

## 2023-06-02 RX ORDER — SODIUM CHLORIDE 9 MG/ML
1000 INJECTION INTRAMUSCULAR; INTRAVENOUS; SUBCUTANEOUS
Refills: 0 | Status: COMPLETED | OUTPATIENT
Start: 2023-06-02 | End: 2023-06-03

## 2023-06-02 RX ADMIN — SODIUM CHLORIDE 100 MILLILITER(S): 9 INJECTION INTRAMUSCULAR; INTRAVENOUS; SUBCUTANEOUS at 13:40

## 2023-06-02 RX ADMIN — HEPARIN SODIUM 5000 UNIT(S): 5000 INJECTION INTRAVENOUS; SUBCUTANEOUS at 07:21

## 2023-06-02 RX ADMIN — LATANOPROST 1 DROP(S): 0.05 SOLUTION/ DROPS OPHTHALMIC; TOPICAL at 00:45

## 2023-06-02 RX ADMIN — HEPARIN SODIUM 7500 UNIT(S): 5000 INJECTION INTRAVENOUS; SUBCUTANEOUS at 23:05

## 2023-06-02 RX ADMIN — Medication 8: at 22:56

## 2023-06-02 RX ADMIN — INSULIN GLARGINE 7 UNIT(S): 100 INJECTION, SOLUTION SUBCUTANEOUS at 00:44

## 2023-06-02 RX ADMIN — BRIMONIDINE TARTRATE 1 DROP(S): 2 SOLUTION/ DROPS OPHTHALMIC at 00:44

## 2023-06-02 RX ADMIN — DORZOLAMIDE HYDROCHLORIDE 1 DROP(S): 20 SOLUTION/ DROPS OPHTHALMIC at 07:21

## 2023-06-02 RX ADMIN — ATORVASTATIN CALCIUM 40 MILLIGRAM(S): 80 TABLET, FILM COATED ORAL at 23:04

## 2023-06-02 RX ADMIN — BRIMONIDINE TARTRATE 1 DROP(S): 2 SOLUTION/ DROPS OPHTHALMIC at 07:22

## 2023-06-02 RX ADMIN — Medication 8: at 18:50

## 2023-06-02 RX ADMIN — Medication 2 UNIT(S): at 18:50

## 2023-06-02 RX ADMIN — BRIMONIDINE TARTRATE 1 DROP(S): 2 SOLUTION/ DROPS OPHTHALMIC at 23:04

## 2023-06-02 RX ADMIN — Medication 2 UNIT(S): at 13:30

## 2023-06-02 RX ADMIN — Medication 2 UNIT(S): at 09:15

## 2023-06-02 RX ADMIN — INSULIN GLARGINE 7 UNIT(S): 100 INJECTION, SOLUTION SUBCUTANEOUS at 23:04

## 2023-06-02 RX ADMIN — Medication 4: at 13:40

## 2023-06-02 RX ADMIN — LATANOPROST 1 DROP(S): 0.05 SOLUTION/ DROPS OPHTHALMIC; TOPICAL at 23:04

## 2023-06-02 RX ADMIN — DORZOLAMIDE HYDROCHLORIDE 1 DROP(S): 20 SOLUTION/ DROPS OPHTHALMIC at 18:55

## 2023-06-02 RX ADMIN — HEPARIN SODIUM 5000 UNIT(S): 5000 INJECTION INTRAVENOUS; SUBCUTANEOUS at 13:41

## 2023-06-02 RX ADMIN — BRIMONIDINE TARTRATE 1 DROP(S): 2 SOLUTION/ DROPS OPHTHALMIC at 13:41

## 2023-06-02 NOTE — CONSULT NOTE ADULT - PROBLEM SELECTOR RECOMMENDATION 4
.  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 Sims   - currently receiving XRT  - If no further disease modifying treatments offered or patient/family declined further disease modifying treatments,  patient would qualify for hospice .  hypotensive prior to admission, also suspect element of poor po intake 2/2 cancer   - no indication for HD  - cw fluids per primary team

## 2023-06-02 NOTE — PROGRESS NOTE ADULT - PROBLEM SELECTOR PLAN 1
Pt presented with initial BP of 58/30 with HR of 90, which transiently reached 108 in ED. Pt not meeting SIRS criteria, as pt had WBC of 9.82, RR 18, and afebrile, however pt meeting qSofa for AMS (somnolence) and low systolic BP. Possible etiologies include sepsis, hemorrhagic shock (though less likely given lack of bleeding), medication induced hypotension. Pt s/p Vancomycin 2g and Cefepime 2g in ED. UA negative. CXR showed no pneumonias.   - S/p NS infusion    Plan:   - C/w fluids- NS @ 100cc's   - Monitor vitals Baseline AAOx3 but AAOx1 on admission. Concern for hypotension vs infection vs trauma vs brain metastases vs DKA iso elevated BHB, lactic acid, AG vs hypercarbic encephalopathy vs hypoxia  - CT head obtained with no hemorrhage and metastases appreciated  - White count elevated on 6/2 but on outpatient records has intermittent elevated white count iso cancer  - ABG obtained revealing ph of 7.4, pco2 of 31, and Po2 of 74  - Most recent glucose at 234 which appears consistent with outpatient glucose levels    Plan:  - C/w  cc's/10 hrs   - Monitor vitals  - Follow up 6 pm CMP

## 2023-06-02 NOTE — CONSULT NOTE ADULT - PROBLEM SELECTOR RECOMMENDATION 2
.  baseline AOx3. AOx1 at admission. +UTI and prerenal ROBYN, likely due to poor PO intake.   - CT reviewed  - cw  abx, med mgmt per primary team   - unable to participate in GOC .  per iSTOP and chart review: fent 25 mcg/hr, garcia 100 mg PO TID, oxy IR 15 mg PRN  - would recommend cw home regimen   - hold oxy IR PRN for somnolence   - BR, senna 2 tabs PO qHS

## 2023-06-02 NOTE — CONSULT NOTE ADULT - PROBLEM SELECTOR RECOMMENDATION 5
.  - Full code  - No HCP on file, pts wife Jennifer will be legal decision making surrogate .  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 Sims   - currently receiving XRT  - If no further disease modifying treatments offered or patient/family declined further disease modifying treatments,  patient would qualify for hospice

## 2023-06-02 NOTE — PROGRESS NOTE ADULT - PROBLEM SELECTOR PLAN 2
Pt with BUN/Cr of 7/1.87 on admission, unclear baseline. Pt's PCP is located at [facility] [phone] but was unable to be reached at time of admission --  left. Suspecting pre-renal etiology i/s/o hypotension, however cannot exclude CKD given lack of collateral   Plan:   - F/u urine lytes in AM  - Strict I/Os   - Avoid nephrotoxic agents  - F/u bladder scan Pt presented with initial BP of 58/30 with HR of 90, which transiently reached 108 in ED. Pt not meeting SIRS criteria, as pt had WBC of 9.82, RR 18, and afebrile, however pt meeting qSofa for AMS (somnolence) and low systolic BP. Possible etiologies include sepsis, hemorrhagic shock (though less likely given lack of bleeding), medication induced hypotension. Pt s/p Vancomycin 2g and Cefepime 2g in ED. UA negative. CXR showed no pneumonias.   - S/p NS infusion    Plan:   - C/w fluids- NS @ 100cc's   - Monitor vitals

## 2023-06-02 NOTE — PROGRESS NOTE ADULT - PROBLEM SELECTOR PLAN 8
Patient at baseline AAOx3 but presenting at AAOX1. Concern for hypotension as main concern vs F: S/p 3L in ED, NS gtt   E: Replete PRN K>3.5, Mg > 2   GI: Omeprazole 40mg at home   Diet: Consistent Carb   DVT: Heparin SubQ   Code: Full code

## 2023-06-02 NOTE — CONSULT NOTE ADULT - SUBJECTIVE AND OBJECTIVE BOX
HPI:  Mr. Robles is a 63 y/o [place]an speaking M with a PMHx of adenoid CA currently undergoing radiation, HTN, glaucoma, GERD, DM, cataracts. History obtained in ED from spouse Jennifer Gould [phone] using  services as pt somnolent. Pt was in his usual state of health today undergoing "immunization & radiotherapy" with "Dr. Jeff Guo" when he suddenly became dizzy and somnolent. Pt had reportedly had very low blood pressure for which EMS was called. Pt has never had these sx prior. On questioning, pt able to answer some questions alongside spouse, denies SOB and cough, palpitations and CP, headaches, fevers, chills, nausea, vomiting, diarrhea, constipation, dysuria, hematuria, hematochezia.   Pt admitted to [facility] for distributive shock presumed to be from sepsis in the setting of chemo  (01 Jun 2023 17:45)    Pt seen and examined using  430023. ROS/Social history/GOC limited due to patient mental status/altered/ ?[place].  Comprehensive ROS as noted below, collateral obtained from chart/team.     PAST MEDICAL & SURGICAL HISTORY:  HTN (hypertension)  Glaucoma  H/O gastroesophageal reflux (GERD)  Cancer of adenoid  chemo & RT  Malignant neoplasm of nasal cavity  DM (diabetes mellitus)  type 2, not on meds  History of cholecystectomy  Right cataract  History of nasal surgery  biopsy    FAMILY HISTORY:  Type 2 diabetes mellitus (Mother)  Mother had T2DM.    Hypertension (Sibling)  Has brother with arterial hypertension.     Unable to review dt ms    Opiate Naive (Y/N): No  iStop reviewed (Y/N): Yes.   Yes Rx found on iStop review (Ref#:    [id number]       PDI	First Name	Last Name	Birth Date	Gender	Street Address	[facility]	State	Zip Code  ELIZA Sinha	1960	Male	[address]	[place]	[address]    Prescription Information      PDI Filter:    PDI	Current Rx	Drug Type	Rx Written	Rx Dispensed	Drug	Quantity	Days Supply	Prescriber Name	Prescriber TIANNA #	Payment Method	Dispenser  A	Y	O	05/18/2023	05/18/2023	oxycodone hcl (ir) 15 mg tab	60	20	Sadi Wilkerson	[id number]	Insurance	[facility]  A	Y	O	05/18/2023	05/18/2023	fentanyl 25 mcg/hr patch	10	30	Sadi Wilkerson	[id number]	Insurance	[facility]  A	N	O	04/20/2023	04/21/2023	fentanyl 25 mcg/hr patch	10	30	Sadi Wilkerson	[id number]	Insurance	[facility]  A	N	O	04/20/2023	04/20/2023	oxycodone hcl (ir) 15 mg tab	60	20	Sadi Wilkerson	[id number]	Insurance	[facility]  A	N	O	03/23/2023	03/24/2023	fentanyl 12 mcg/hr patch	10	30	Sadi Wilkerson	[id number]	Insurance	[facility]  A	N	O	03/23/2023	03/24/2023	oxycodone hcl (ir) 10 mg tab	60	20	Sadi Wilkerson	[id number]	Insurance	[facility]  A	N	O	01/19/2023	01/19/2023	oxycodone hcl (ir) 15 mg tab	60	20	Sadi Wilkerson	[id number]	Insurance	[facility]  A	N	O	11/25/2022	11/25/2022	fentanyl 25 mcg/hr patch	10	40	Sadi Wilkerson	[id number]	Insurance	[facility]  A	N	O	11/25/2022	11/25/2022	oxycodone hcl (ir) 15 mg tab	40	15	Sadi Wilkerson	[id number]	Insurance	[facility]  A	N	O	11/25/2022	11/25/2022	oxycodone hcl (ir) 15 mg tab	20	7	Sadi Wilkerson	[id number]	Cash	[facility]  A	N	O	10/27/2022	11/04/2022	fentanyl 12 mcg/hr patch	10	30	Sadi Wilkerson	[id number]	Insurance	[facility]  A	N	O	10/27/2022	10/27/2022	oxycodone hcl (ir) 10 mg tab	60	20	Sadi Wilkerson	[id number]	Insurance	[facility]  A	N	O	10/07/2022	10/14/2022	oxycodone hcl (ir) 10 mg tab	60	10	Jeff Gunn)	[id number]	Insurance	[facility]  A	N	B	10/13/2022	10/13/2022	lorazepam 1 mg tablet	5	5	Jeff Gunn)	[id number]	Insurance	[facility]  A	N	O	10/03/2022	10/06/2022	oxycodone hcl (ir) 10 mg tab	60	10	Jeff Gunn)	[id number]	Insurance	[facility]  A	N	O	09/29/2022	10/03/2022	fentanyl 12 mcg/hr patch	10	30	Lianartmike, Sadi	[id number]	Insurance	[facility]  A	N	B	09/29/2022	10/03/2022	lorazepam 0.5 mg tablet	30	30	Zanartu, Sadi	[id number]	Insurance	[facility]  A	N	O	09/22/2022	09/23/2022	oxycodone hcl (ir) 10 mg tab	60	10	Jeff Gunn)	[id number]	Insurance	[facility]  A	N	O	09/01/2022	09/03/2022	fentanyl 12 mcg/hr patch	10	30	Lianartmiek, Sadi	[id number]	Insurance	[facility]  A	N	B	09/01/2022	09/01/2022	lorazepam 0.5 mg tablet	30	30	LianartSadi mckeon	[id number]	Insurance	[facility]  A	N	O	08/26/2022	08/29/2022	oxycodone hcl (ir) 10 mg tab	60	10	Jeff Gunn)	[id number]	Insurance	[facility]  A	N	O	08/11/2022	08/19/2022	methadone hcl 5 mg tablet	90	30	Latrell Sims MD	[id number]	Insurance	[facility]    Items that are not checked are not present  PSYCHOSOCIAL ASSESSMENT:  - Significant other/partner: [  ]  Children: [ x]  - Living Situation: Home [ x ] Long term care [  ]  Rehab[  ]  Other[  ]  - Support system: strong[  ] adequate[  ] inadequate[  ]  - Advent/Spiritual practice:  - Role of organized [employer] important [  ] some [  ] unable to assess dt pt mentation [x  ]  - Coping: well[  ]  with difficulty[  ]  poor coping[  ]  unable to assess dt pt mentation [ x ]  - [place]an speaking     ADVANCE DIRECTIVES:    - MOLST[  ]    Living Will [  ]   DECISION MAKER(s):  - Health Care Proxy(s) [  ]  Surrogate(s)[x  ] Guardian[  ]           Name(s)/Phone Number(s):   - pt with wife, Jennifer, unable to reach at listed phone number    BASELINE (I)ADLs (prior to admission): unable to obtain   - [place]:  Total[  ] Moderate[  ] Dependent[  ]    Allergies    penicillin (Rash)    Intolerances        Medications:      MEDICATIONS  (STANDING):  atorvastatin 40 milliGRAM(s) Oral at bedtime  brimonidine 0.2% Ophthalmic Solution 1 Drop(s) Both EYES three times a day  dextrose 5%. 1000 milliLiter(s) (100 mL/Hr) IV Continuous <Continuous>  dextrose 5%. 1000 milliLiter(s) (50 mL/Hr) IV Continuous <Continuous>  dextrose 50% Injectable 25 Gram(s) IV Push once  dextrose 50% Injectable 25 Gram(s) IV Push once  dextrose 50% Injectable 12.5 Gram(s) IV Push once  dorzolamide 2% Ophthalmic Solution 1 Drop(s) Both EYES <User Schedule>  fludroCORTISONE 0.2 milliGRAM(s) Oral daily  glucagon  Injectable 1 milliGRAM(s) IntraMuscular once  heparin   Injectable 7500 Unit(s) SubCutaneous every 8 hours  insulin glargine Injectable (LANTUS) 7 Unit(s) SubCutaneous at bedtime  insulin lispro (ADMELOG) corrective regimen sliding scale   SubCutaneous Before meals and at bedtime  insulin lispro Injectable (ADMELOG) 2 Unit(s) SubCutaneous three times a day before meals  latanoprost 0.005% Ophthalmic Solution 1 Drop(s) Both EYES at bedtime  magnesium sulfate  IVPB 4 Gram(s) IV Intermittent once  predniSONE   Tablet 5 milliGRAM(s) Oral daily  trimethoprim  160 mG/sulfamethoxazole 800 mG 1 Tablet(s) Oral every 12 hours    MEDICATIONS  (PRN):  dextrose Oral Gel 15 Gram(s) Oral once PRN Blood Glucose LESS THAN 70 milliGRAM(s)/deciliter      PRESENT SYMPTOMS:   [x ]Unable to obtain due to poor mentation   Source if other than patient:  [ ]Family   [ x]Team     Pain [  ]    PAIN AD Score:  0  http://geriatrictoolkit.[employer].edu/cog/painad.pdf (press ctrl +  left click to view)    Analgesic Use (PRNs) for past 24 hours:  - none     All other review of systems negative [  ]    ECOG Performance:     3  Current Palliative Performance Scale/Karnofsky Score:  40   %  Preadmit Karnofsky:   %          PEx:  General: Obese man, sitting up in bed, difficult to rouse and maintain attention during conversation   HEENT: L ophthalmoplegia with discharge. R eye heavy with patient having to open it up manually throughout conversation   Cardiovascular: +S1/S2, RRR  Respiratory: CTA B/L; no W/R/R  Gastrointestinal: soft, protuberant, , NT; +BSx4  Extremities: WWP; no edema, clubbing or cyanosis  Vascular: 2+ radial, PT pulses B/L  Neurological: AAOx1, inattentive, moves all extremities spontaneously       T(C): 36.4 (06-05-23 @ 05:31), Max: 36.6 (06-04-23 @ 20:38)  HR: 71 (06-05-23 @ 05:31) (71 - 73)  BP: 137/87 (06-05-23 @ 05:31) (127/75 - 137/87)  RR: 18 (06-05-23 @ 05:31) (17 - 18)  SpO2: 95% (06-05-23 @ 05:31) (95% - 97%)  Wt(kg): --    Labs:    CBC:                        11.9   7.92  )-----------( 216      ( 05 Jun 2023 07:55 )             35.9     CMP:    06-05    142  |  107  |  7   ----------------------------<  123<H>  3.5   |  25  |  1.14    Ca    8.5      05 Jun 2023 07:55  Phos  2.1     06-05  Mg     1.7     06-05    TPro  5.1<L>  /  Alb  3.0<L>  /  TBili  0.3  /  DBili  x   /  AST  71<H>  /  ALT  34  /  AlkPhos  86  06-05           RADIOLOGY & ADDITIONAL STUDIES:  Imaging:  Reviewed      GO discussion:  Pt without capacity to engage. Unable to reach surrogate/wife at listed number in chart

## 2023-06-02 NOTE — CONSULT NOTE ADULT - ASSESSMENT
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 He denies recent fevers, congestion, and productive cough. On scope exam, nasal cavity with fullness but without signs of necrosis, ischemia, or infection. On imaging, soft tissue mass present in sphenoid sinus, with extension anteriorly to ethmoid sinus, nasal cavity and nasopharynx. Low likelihood that nasopharyngeal/nasal cavity mass is source of infection.    RECOMMENDATIONS:  - not a candidate for surgical intervention  - recommend heme/onc consult and consider palliative consult  - rest of care per emergency department    ---  Thank you for the kind referral and for allowing me to share in the care of ASAF ROACH If you have any questions, please do not hesitate to contact me.     Sincerely,  Lizbeth Rg  06-01-23 @ 16:06  
62y Male with PMHx of adenoid CA currently undergoing radiation, HTN, glaucoma, GERD, DM, cataracts presents to [facility] ED for episode of decreased responsiveness, pallor, and lethargy with hypotension to sBPs in 50s. On arrival, patient unarousable to noxious, stroke code called for AMS and concern for hemorrhage. NIHSS 1 mild lethargy, although difficult to assess as patient is unable to open his eyes (chronic). Nonfocal neurologic exam. Patient hypotensive to systolics in 80s/60s per manual BP reading, decreased radial pulses bilaterally. After fluid blous patient appeared to be more responsive. Per family, they were concerned because he looked more pale and pt stated he felt generally weak. Of note, per family pt has been getting progressively weak and has had decreased PO intake. CTH negative for acute intracranial pathology, CTP/CTA H/N deferred due to low concern for ischemic event. Patient denies headache, difficulty speaking, weakness/numbness.     Low concern for ischemic event, decreased responsiveness likely due to episode of hypotension, upon fluid resuscitation patient appeared back to baseline.    Plan:  - rest of care per ED     No further stroke workup warranted at this time. Stroke to sign off.    Discussed case with Stroke Fellow Dr. Huggins, Stroke Attending Dr. Roca 
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 CT hed neg for acute path. found to have Enterococcal UTI. Palliative cs for GOC.

## 2023-06-02 NOTE — PROGRESS NOTE ADULT - PROBLEM SELECTOR PLAN 4
Pt receives Dosolimide 2% BID, Brimonidine 0.15% TID, and Latoprost eyedrops at home. Pt reportedly blind vs visually impaired as per spouse.   Plan:   - C/w home meds Pt states he has cancer of adenoids and sees Dr. Raymundo Guo, unable to find Oncologist in [place]. Pt's spouse unsure of providers full name.     Plan:   - Consider oncology consult as per ENT recs

## 2023-06-02 NOTE — CONSULT NOTE ADULT - PROBLEM SELECTOR RECOMMENDATION 7
.  Complex medical decision making / symptom management in the setting of advanced illness.    Coping:  well[  ] with difficulty[  ] poor coping[  ] unable to assess[ x ]   Support system: strong[  ] adequate[  ] inadequate[  ]    Active Psychosocial Referrals:  SW/EMILI[ x ] PT/OT[  ] Hospice[  ]  Ethics[      - For new or uncontrolled symptoms, please call Palliative Care at [phone]. The service is available 24/7 (including nights & weekends) to provide symptom management recommendations over the phone as appropriate  - Will continue to follow for ongoing GOC discussion / titration of palliative regimen. Emotional support provided, questions answered.

## 2023-06-02 NOTE — PROGRESS NOTE ADULT - PROBLEM SELECTOR PLAN 5
Pt's wife states pt takes only Metformin at home. Collateral obtained with [facility] states pt is also on Basaglar Insulin 10U at bedtime, Novolog Flexpen 2U TID, in addition to Metformin 500mg QD.   Plan:   - mISS with consistent carb diet   - C/w lantus 7U at bedtime   - C/w admelog 2U premeal TID   - F/u A1C in AM Pt receives Dosolimide 2% BID, Brimonidine 0.15% TID, and Latoprost eyedrops at home. Pt reportedly blind vs visually impaired as per spouse.   Plan:   - C/w home meds

## 2023-06-02 NOTE — CONSULT NOTE ADULT - PROBLEM SELECTOR RECOMMENDATION 6
.  Complex medical decision making / symptom management in the setting of advanced illness.    Coping:  well[  ] with difficulty[  ] poor coping[  ] unable to assess[ x ]   Support system: strong[  ] adequate[  ] inadequate[  ]    Active Psychosocial Referrals:  SW/EMILI[ x ] PT/OT[  ] Hospice[  ]  Ethics[      - For new or uncontrolled symptoms, please call Palliative Care at [phone]. The service is available 24/7 (including nights & weekends) to provide symptom management recommendations over the phone as appropriate  - Will continue to follow for ongoing GOC discussion / titration of palliative regimen. Emotional support provided, questions answered. .  - Full code  - No HCP on file, pts wife Jennifer will be legal decision making surrogate

## 2023-06-02 NOTE — CONSULT NOTE ADULT - PROBLEM SELECTOR RECOMMENDATION 9
.  iso cancer on active treatment, body habitus pps 50% requires assistance with ADLs  - cw supportive care, encourage OOB as deja  - PT

## 2023-06-02 NOTE — PROGRESS NOTE ADULT - PROBLEM SELECTOR PLAN 6
Pt has hx of HTN for which he takes several anti-hypertensives: Amlodipine 5mg QD, Losartan 100mg QD, and Metoprolol ER 25mg QD   Plan:   - Holding antihypertensives i/s/o hypotension   - Resume when appropriate Pt's wife states pt takes only Metformin at home. [facility]ateral obtained with [facility] states pt is also on Basaglar Insulin 10U at bedtime, Novolog Flexpen 2U TID, in addition to Metformin 500mg QD.   - A1c 9.7  - Elevated BHB at 2.6, w/ AG at 15 but glucose at 238 which appears consistent with outpatient glucose levels    Plan:   - mISS with consistent carb diet   - C/w lantus 7U at bedtime   - C/w admelog 2U premeal TID   - Consider Endo consult

## 2023-06-02 NOTE — CONSULT NOTE ADULT - PROBLEM SELECTOR RECOMMENDATION 3
.  hypotensive prior to admission, also suspect element of poor po intake 2/2 cancer   - no indication for HD  - cw fluids per primary team .  baseline AOx3. AOx1 at admission. +UTI and prerenal ROBYN, likely due to poor PO intake.   - CT reviewed  - cw  abx, med mgmt per primary team   - unable to participate in GOC

## 2023-06-02 NOTE — PROGRESS NOTE ADULT - PROBLEM SELECTOR PLAN 3
Pt states he has cancer of adenoids and sees Dr. Raymundo Guo, unable to find Oncologist in [facility]. Pt's spouse unsure of providers full name.   Plan:   - Collateral in AM with PCP [phone],  left   - Consider Heme/Onc consult in AM as per ENT recs Pt with BUN/Cr of 7/1.87 on admission, unclear baseline. Pt's PCP is located at [facility] [phone] but was unable to be reached at time of admission --  left. Suspecting pre-renal etiology i/s/o hypotension, however cannot exclude CKD given lack of collateral .  - Cr downtrending at 1.34 most recently     Plan:   - F/u urine lytes  - NS @ 100 cc's/10 hours  - F/u 6 pm CMP  - Strict I/Os   - Avoid nephrotoxic agents

## 2023-06-02 NOTE — PROGRESS NOTE ADULT - PROBLEM SELECTOR PLAN 7
F: S/p 3L in ED, NS gtt   E: Replete PRN K>3.5, Mg > 2   GI: Omeprazole 40mg at home   Diet: Consistent Carb   DVT: Heparin SubQ   Code: Full code Pt has hx of HTN for which he takes several anti-hypertensives: Amlodipine 5mg QD, Losartan 100mg QD, and Metoprolol ER 25mg QD   Plan:   - Holding antihypertensives i/s/o hypotension   - Resume when appropriate

## 2023-06-02 NOTE — PROGRESS NOTE ADULT - SUBJECTIVE AND OBJECTIVE BOX
SUBJECTIVE / INTERVAL HPI: Patient seen and examined at bedside. Difficulty to converse with as patient's mentation is off from his described baseline (AA0x3 at baseline and AAOx1 while being interviewed). Unable to obtain accurate ROS.      PHYSICAL EXAM:    General: Resting in bed, difficult to arouse  HEENT: L eye is shut 2/2 increased discharge. R eye heavy with patient having to open it up manually intermittently.   Cardiovascular: +S1/S2, RRR  Respiratory: CTA B/L; no W/R/R  Gastrointestinal: soft, NT; +BSx4  Extremities: WWP; no edema, clubbing or cyanosis  Vascular: 2+ radial, PT pulses B/L  Neurological: AAOx1 w/ baseline of AA0x3 per wife      VITAL SIGNS:  Vital Signs Last 24 Hrs  T(C): 36.4 (2023 13:00), Max: 36.6 (2023 18:09)  T(F): 97.6 (2023 13:00), Max: 97.8 (2023 18:09)  HR: 85 (2023 13:00) (85 - 97)  BP: 100/65 (2023 13:00) (96/64 - 111/71)  BP(mean): 77 (2023 13:00) (73 - 77)  RR: 19 (2023 13:00) (16 - 19)  SpO2: 95% (2023 13:00) (94% - 99%)    Parameters below as of 2023 13:00  Patient On (Oxygen Delivery Method): room air          MEDICATIONS:  MEDICATIONS  (STANDING):  atorvastatin 40 milliGRAM(s) Oral at bedtime  brimonidine 0.2% Ophthalmic Solution 1 Drop(s) Both EYES three times a day  dextrose 5%. 1000 milliLiter(s) (50 mL/Hr) IV Continuous <Continuous>  dextrose 5%. 1000 milliLiter(s) (100 mL/Hr) IV Continuous <Continuous>  dextrose 50% Injectable 25 Gram(s) IV Push once  dextrose 50% Injectable 25 Gram(s) IV Push once  dextrose 50% Injectable 12.5 Gram(s) IV Push once  dorzolamide 2% Ophthalmic Solution 1 Drop(s) Both EYES <User Schedule>  glucagon  Injectable 1 milliGRAM(s) IntraMuscular once  heparin   Injectable 7500 Unit(s) SubCutaneous every 8 hours  insulin glargine Injectable (LANTUS) 7 Unit(s) SubCutaneous at bedtime  insulin lispro (ADMELOG) corrective regimen sliding scale   SubCutaneous Before meals and at bedtime  insulin lispro Injectable (ADMELOG) 2 Unit(s) SubCutaneous three times a day before meals  latanoprost 0.005% Ophthalmic Solution 1 Drop(s) Both EYES at bedtime  magnesium sulfate  IVPB 4 Gram(s) IV Intermittent once  sodium chloride 0.9%. 1000 milliLiter(s) (100 mL/Hr) IV Continuous <Continuous>  sodium chloride 0.9%. 1000 milliLiter(s) (125 mL/Hr) IV Continuous <Continuous>    MEDICATIONS  (PRN):  dextrose Oral Gel 15 Gram(s) Oral once PRN Blood Glucose LESS THAN 70 milliGRAM(s)/deciliter      ALLERGIES:  Allergies    penicillin (Rash)    Intolerances        LABS:                        12.5   12.72 )-----------( 190      ( 2023 05:30 )             38.6     06-02    138  |  105  |  10  ----------------------------<  268<H>  4.5   |  18<L>  |  1.34<H>    Ca    8.4      2023 12:13  Phos  2.6     06-02  Mg     1.4     06-02    TPro  6.3  /  Alb  3.2<L>  /  TBili  0.5  /  DBili  x   /  AST  28  /  ALT  28  /  AlkPhos  110  06-02    PT/INR - ( 2023 13:20 )   PT: 12.8 sec;   INR: 1.07          PTT - ( 2023 13:20 )  PTT:39.9 sec  Urinalysis Basic - ( 2023 14:51 )    Color: Yellow / Appearance: Clear / S.010 / pH: x  Gluc: x / Ketone: NEGATIVE  / Bili: Negative / Urobili: 0.2 E.U./dL   Blood: x / Protein: NEGATIVE mg/dL / Nitrite: NEGATIVE   Leuk Esterase: NEGATIVE / RBC: x / WBC x   Sq Epi: x / Non Sq Epi: x / Bacteria: x      CAPILLARY BLOOD GLUCOSE  170 (2023 14:12)      POCT Blood Glucose.: 234 mg/dL (2023 13:13)      RADIOLOGY & ADDITIONAL TESTS: Reviewed.

## 2023-06-03 LAB
-  AMPICILLIN: SIGNIFICANT CHANGE UP
-  CIPROFLOXACIN: SIGNIFICANT CHANGE UP
-  NITROFURANTOIN: SIGNIFICANT CHANGE UP
-  TETRACYCLINE: SIGNIFICANT CHANGE UP
-  VANCOMYCIN: SIGNIFICANT CHANGE UP
ALBUMIN SERPL ELPH-MCNC: 3.2 G/DL — LOW (ref 3.3–5)
ALP SERPL-CCNC: 96 U/L — SIGNIFICANT CHANGE UP (ref 40–120)
ALT FLD-CCNC: 24 U/L — SIGNIFICANT CHANGE UP (ref 10–45)
ANION GAP SERPL CALC-SCNC: 7 MMOL/L — SIGNIFICANT CHANGE UP (ref 5–17)
AST SERPL-CCNC: 23 U/L — SIGNIFICANT CHANGE UP (ref 10–40)
BASOPHILS # BLD AUTO: 0.03 K/UL — SIGNIFICANT CHANGE UP (ref 0–0.2)
BASOPHILS NFR BLD AUTO: 0.2 % — SIGNIFICANT CHANGE UP (ref 0–2)
BILIRUB SERPL-MCNC: 0.4 MG/DL — SIGNIFICANT CHANGE UP (ref 0.2–1.2)
BUN SERPL-MCNC: 11 MG/DL — SIGNIFICANT CHANGE UP (ref 7–23)
CALCIUM SERPL-MCNC: 7.9 MG/DL — LOW (ref 8.4–10.5)
CHLORIDE SERPL-SCNC: 107 MMOL/L — SIGNIFICANT CHANGE UP (ref 96–108)
CO2 SERPL-SCNC: 22 MMOL/L — SIGNIFICANT CHANGE UP (ref 22–31)
CREAT SERPL-MCNC: 1.34 MG/DL — HIGH (ref 0.5–1.3)
CULTURE RESULTS: SIGNIFICANT CHANGE UP
EGFR: 59 ML/MIN/1.73M2 — LOW
EOSINOPHIL # BLD AUTO: 0 K/UL — SIGNIFICANT CHANGE UP (ref 0–0.5)
EOSINOPHIL NFR BLD AUTO: 0 % — SIGNIFICANT CHANGE UP (ref 0–6)
GLUCOSE BLDC GLUCOMTR-MCNC: 177 MG/DL — HIGH (ref 70–99)
GLUCOSE BLDC GLUCOMTR-MCNC: 207 MG/DL — HIGH (ref 70–99)
GLUCOSE BLDC GLUCOMTR-MCNC: 221 MG/DL — HIGH (ref 70–99)
GLUCOSE BLDC GLUCOMTR-MCNC: 265 MG/DL — HIGH (ref 70–99)
GLUCOSE SERPL-MCNC: 222 MG/DL — HIGH (ref 70–99)
HCT VFR BLD CALC: 38.5 % — LOW (ref 39–50)
HGB BLD-MCNC: 12.6 G/DL — LOW (ref 13–17)
IMM GRANULOCYTES NFR BLD AUTO: 1.1 % — HIGH (ref 0–0.9)
LYMPHOCYTES # BLD AUTO: 21.7 % — SIGNIFICANT CHANGE UP (ref 13–44)
LYMPHOCYTES # BLD AUTO: 4.12 K/UL — HIGH (ref 1–3.3)
MAGNESIUM SERPL-MCNC: 1.6 MG/DL — SIGNIFICANT CHANGE UP (ref 1.6–2.6)
MCHC RBC-ENTMCNC: 31.3 PG — SIGNIFICANT CHANGE UP (ref 27–34)
MCHC RBC-ENTMCNC: 32.7 GM/DL — SIGNIFICANT CHANGE UP (ref 32–36)
MCV RBC AUTO: 95.8 FL — SIGNIFICANT CHANGE UP (ref 80–100)
METHOD TYPE: SIGNIFICANT CHANGE UP
MONOCYTES # BLD AUTO: 0.45 K/UL — SIGNIFICANT CHANGE UP (ref 0–0.9)
MONOCYTES NFR BLD AUTO: 2.4 % — SIGNIFICANT CHANGE UP (ref 2–14)
NEUTROPHILS # BLD AUTO: 14.15 K/UL — HIGH (ref 1.8–7.4)
NEUTROPHILS NFR BLD AUTO: 74.6 % — SIGNIFICANT CHANGE UP (ref 43–77)
NRBC # BLD: 0 /100 WBCS — SIGNIFICANT CHANGE UP (ref 0–0)
ORGANISM # SPEC MICROSCOPIC CNT: SIGNIFICANT CHANGE UP
ORGANISM # SPEC MICROSCOPIC CNT: SIGNIFICANT CHANGE UP
PHOSPHATE SERPL-MCNC: 1.9 MG/DL — LOW (ref 2.5–4.5)
PLATELET # BLD AUTO: 230 K/UL — SIGNIFICANT CHANGE UP (ref 150–400)
POTASSIUM SERPL-MCNC: 4.6 MMOL/L — SIGNIFICANT CHANGE UP (ref 3.5–5.3)
POTASSIUM SERPL-SCNC: 4.6 MMOL/L — SIGNIFICANT CHANGE UP (ref 3.5–5.3)
PROT SERPL-MCNC: 5.6 G/DL — LOW (ref 6–8.3)
RBC # BLD: 4.02 M/UL — LOW (ref 4.2–5.8)
RBC # FLD: 14.9 % — HIGH (ref 10.3–14.5)
SODIUM SERPL-SCNC: 136 MMOL/L — SIGNIFICANT CHANGE UP (ref 135–145)
SPECIMEN SOURCE: SIGNIFICANT CHANGE UP
WBC # BLD: 18.95 K/UL — HIGH (ref 3.8–10.5)
WBC # FLD AUTO: 18.95 K/UL — HIGH (ref 3.8–10.5)

## 2023-06-03 PROCEDURE — 71045 X-RAY EXAM CHEST 1 VIEW: CPT | Mod: 26

## 2023-06-03 RX ORDER — CEFTRIAXONE 500 MG/1
2000 INJECTION, POWDER, FOR SOLUTION INTRAMUSCULAR; INTRAVENOUS EVERY 24 HOURS
Refills: 0 | Status: DISCONTINUED | OUTPATIENT
Start: 2023-06-03 | End: 2023-06-05

## 2023-06-03 RX ORDER — FLUDROCORTISONE ACETATE 0.1 MG/1
0.2 TABLET ORAL DAILY
Refills: 0 | Status: DISCONTINUED | OUTPATIENT
Start: 2023-06-03 | End: 2023-06-05

## 2023-06-03 RX ORDER — MAGNESIUM SULFATE 500 MG/ML
2 VIAL (ML) INJECTION ONCE
Refills: 0 | Status: COMPLETED | OUTPATIENT
Start: 2023-06-03 | End: 2023-06-03

## 2023-06-03 RX ORDER — SODIUM,POTASSIUM PHOSPHATES 278-250MG
2 POWDER IN PACKET (EA) ORAL ONCE
Refills: 0 | Status: COMPLETED | OUTPATIENT
Start: 2023-06-03 | End: 2023-06-03

## 2023-06-03 RX ORDER — SODIUM CHLORIDE 9 MG/ML
1000 INJECTION INTRAMUSCULAR; INTRAVENOUS; SUBCUTANEOUS
Refills: 0 | Status: DISCONTINUED | OUTPATIENT
Start: 2023-06-03 | End: 2023-06-04

## 2023-06-03 RX ADMIN — CEFTRIAXONE 100 MILLIGRAM(S): 500 INJECTION, POWDER, FOR SOLUTION INTRAMUSCULAR; INTRAVENOUS at 13:29

## 2023-06-03 RX ADMIN — BRIMONIDINE TARTRATE 1 DROP(S): 2 SOLUTION/ DROPS OPHTHALMIC at 22:06

## 2023-06-03 RX ADMIN — HEPARIN SODIUM 7500 UNIT(S): 5000 INJECTION INTRAVENOUS; SUBCUTANEOUS at 13:28

## 2023-06-03 RX ADMIN — Medication 2 UNIT(S): at 13:28

## 2023-06-03 RX ADMIN — SODIUM CHLORIDE 100 MILLILITER(S): 9 INJECTION INTRAMUSCULAR; INTRAVENOUS; SUBCUTANEOUS at 09:27

## 2023-06-03 RX ADMIN — Medication 2 PACKET(S): at 09:28

## 2023-06-03 RX ADMIN — HEPARIN SODIUM 7500 UNIT(S): 5000 INJECTION INTRAVENOUS; SUBCUTANEOUS at 07:01

## 2023-06-03 RX ADMIN — Medication 25 GRAM(S): at 09:29

## 2023-06-03 RX ADMIN — DORZOLAMIDE HYDROCHLORIDE 1 DROP(S): 20 SOLUTION/ DROPS OPHTHALMIC at 18:44

## 2023-06-03 RX ADMIN — SODIUM CHLORIDE 100 MILLILITER(S): 9 INJECTION INTRAMUSCULAR; INTRAVENOUS; SUBCUTANEOUS at 13:29

## 2023-06-03 RX ADMIN — Medication 4: at 09:27

## 2023-06-03 RX ADMIN — Medication 4: at 18:42

## 2023-06-03 RX ADMIN — Medication 6: at 22:07

## 2023-06-03 RX ADMIN — Medication 2: at 13:28

## 2023-06-03 RX ADMIN — HEPARIN SODIUM 7500 UNIT(S): 5000 INJECTION INTRAVENOUS; SUBCUTANEOUS at 22:08

## 2023-06-03 RX ADMIN — INSULIN GLARGINE 7 UNIT(S): 100 INJECTION, SOLUTION SUBCUTANEOUS at 22:08

## 2023-06-03 RX ADMIN — Medication 2 UNIT(S): at 09:27

## 2023-06-03 RX ADMIN — DORZOLAMIDE HYDROCHLORIDE 1 DROP(S): 20 SOLUTION/ DROPS OPHTHALMIC at 07:01

## 2023-06-03 RX ADMIN — LATANOPROST 1 DROP(S): 0.05 SOLUTION/ DROPS OPHTHALMIC; TOPICAL at 22:07

## 2023-06-03 RX ADMIN — Medication 2 UNIT(S): at 18:43

## 2023-06-03 RX ADMIN — BRIMONIDINE TARTRATE 1 DROP(S): 2 SOLUTION/ DROPS OPHTHALMIC at 07:01

## 2023-06-03 RX ADMIN — ATORVASTATIN CALCIUM 40 MILLIGRAM(S): 80 TABLET, FILM COATED ORAL at 22:08

## 2023-06-03 RX ADMIN — Medication 5 MILLIGRAM(S): at 16:38

## 2023-06-03 RX ADMIN — BRIMONIDINE TARTRATE 1 DROP(S): 2 SOLUTION/ DROPS OPHTHALMIC at 16:36

## 2023-06-03 NOTE — PROGRESS NOTE ADULT - SUBJECTIVE AND OBJECTIVE BOX
Patient is a 62y old Male who presents with a chief complaint of hypotension and AMS. Patient's neurological status is improving and seems more awake and interactive, especially with his family at bedside. No complaints at this time.    PAST MEDICAL/SURGICAL HISTORY  PAST MEDICAL & SURGICAL HISTORY:  HTN (hypertension)  Glaucoma  H/O gastroesophageal reflux (GERD)  Cancer of adenoid  chemo & RT  Malignant neoplasm of nasal cavity  DM (diabetes mellitus)  type 2, not on meds  History of cholecystectomy  Right cataract  History of nasal surgery  biopsy      PERSONAL SOCIAL HISTORY  Social History:  Pt denies cigarette smoking  Pt had 20 year hx of EtOH abuse, daily rum. Quit 8 years ago in 2015.   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    Hypertension (Sibling)  Has brother with arterial hypertension.    MEDICATIONS  (STANDING):  atorvastatin 40 milliGRAM(s) Oral at bedtime  brimonidine 0.2% Ophthalmic Solution 1 Drop(s) Both EYES three times a day  cefTRIAXone   IVPB 2000 milliGRAM(s) IV Intermittent every 24 hours  dextrose 5%. 1000 milliLiter(s) (50 mL/Hr) IV Continuous <Continuous>  dextrose 5%. 1000 milliLiter(s) (100 mL/Hr) IV Continuous <Continuous>  dextrose 50% Injectable 25 Gram(s) IV Push once  dextrose 50% Injectable 25 Gram(s) IV Push once  dextrose 50% Injectable 12.5 Gram(s) IV Push once  dorzolamide 2% Ophthalmic Solution 1 Drop(s) Both EYES <User Schedule>  fludroCORTISONE 0.2 milliGRAM(s) Oral daily  glucagon  Injectable 1 milliGRAM(s) IntraMuscular once  heparin   Injectable 7500 Unit(s) SubCutaneous every 8 hours  insulin glargine Injectable (LANTUS) 7 Unit(s) SubCutaneous at bedtime  insulin lispro (ADMELOG) corrective regimen sliding scale   SubCutaneous Before meals and at bedtime  insulin lispro Injectable (ADMELOG) 2 Unit(s) SubCutaneous three times a day before meals  latanoprost 0.005% Ophthalmic Solution 1 Drop(s) Both EYES at bedtime  magnesium sulfate  IVPB 4 Gram(s) IV Intermittent once  predniSONE   Tablet 5 milliGRAM(s) Oral daily  sodium chloride 0.9%. 1000 milliLiter(s) (100 mL/Hr) IV Continuous <Continuous>    MEDICATIONS  (PRN):  dextrose Oral Gel 15 Gram(s) Oral once PRN Blood Glucose LESS THAN 70 milliGRAM(s)/deciliter      T(C): 36.3 (06-03-23 @ 10:45), Max: 36.4 (06-03-23 @ 06:18)  HR: 77 (06-03-23 @ 10:45) (77 - 86)  BP: 121/87 (06-03-23 @ 10:45) (102/73 - 121/87)  RR: 19 (06-03-23 @ 10:45) (18 - 20)  SpO2: 92% (06-03-23 @ 10:45) (92% - 98%)  Wt(kg): --Vital Signs Last 24 Hrs  T(C): 36.3 (03 Jun 2023 10:45), Max: 36.4 (03 Jun 2023 06:18)  T(F): 97.3 (03 Jun 2023 10:45), Max: 97.6 (03 Jun 2023 06:18)  HR: 77 (03 Jun 2023 10:45) (77 - 86)  BP: 121/87 (03 Jun 2023 10:45) (102/73 - 121/87)  BP(mean): 98 (03 Jun 2023 10:45) (98 - 98)  RR: 19 (03 Jun 2023 10:45) (18 - 20)  SpO2: 92% (03 Jun 2023 10:45) (92% - 98%)    Parameters below as of 03 Jun 2023 10:45  Patient On (Oxygen Delivery Method): room air      Oxygen Saturation Index= Unable to calculate   [Based on FiO2 = Unknown, SpO2 = 92(06/03/2023 10:45), MAP = Unknown]  Daily     Daily     PHYSICAL EXAM:  GENERAL: NAD  HEAD:  Atraumatic, Normocephalic  EYES: ophthalmoplegia with ptosis of both eyelids  NERVOUS SYSTEM:  Alert & Oriented X2, Good concentration;   CHEST/LUNG: slightly decreased BS in LLB; No rales, rhonchi, wheezing  HEART: Regular rate and rhythm; No murmurs, rubs, or gallops  ABDOMEN: Soft, Nontender, Nondistended; Bowel sounds present  EXTREMITIES:  2+ Peripheral Pulses, No clubbing, cyanosis, or edema    POCT Blood Glucose.: 177 mg/dL (03 Jun 2023 12:53)  POCT Blood Glucose.: 207 mg/dL (03 Jun 2023 09:06)  POCT Blood Glucose.: 305 mg/dL (02 Jun 2023 22:09)  POCT Blood Glucose.: 332 mg/dL (02 Jun 2023 18:09)    LABS:  CBC   06-03-23 @ 05:30  Hematcorit 38.5  Hemoglobin 12.6  Mean Cell Hemoglobin 31.3  Platelet Count-Automated 230  RBC Count 4.02  Red Cell Distrib Width 14.9  Wbc Count 18.95    BMP  06-03-23 @ 05:30  Anion Gap. Serum 7  Blood Urea Nitrogen,Serm 11  Calcium, Total Serum 7.9  Carbon Dioxide, Serum 22  Chloride, Serum 107  Creatinine, Serum 1.34  eGFR in  --  eGFR in Non Afican American --  Gloucose, serum 222  Potassium, Serum 4.6  Sodium, Serum 136    06-02-23 @ 18:26  Anion Gap. Serum 10  Blood Urea Nitrogen,Serm 11  Calcium, Total Serum 7.3  Carbon Dioxide, Serum 21  Chloride, Serum 103  Creatinine, Serum 1.36  eGFR in  --  eGFR in Non Afican American --  Gloucose, serum 368  Potassium, Serum 4.8  Sodium, Serum 134    06-02-23 @ 12:13  Anion Gap. Serum 15  Blood Urea Nitrogen,Serm 10  Calcium, Total Serum 8.4  Carbon Dioxide, Serum 18  Chloride, Serum 105  Creatinine, Serum 1.34  eGFR in  --  eGFR in Non Afican American --  Gloucose, serum 268  Potassium, Serum 4.5  Sodium, Serum 138    06-02-23 @ 05:30  Anion Gap. Serum 15  Blood Urea Nitrogen,Serm 9  Calcium, Total Serum 7.5  Carbon Dioxide, Serum 18  Chloride, Serum 106  Creatinine, Serum 1.32  eGFR in  --  eGFR in Non Afican American --  Gloucose, serum 276  Potassium, Serum 4.7  Sodium, Serum 139    06-01-23 @ 13:20  Anion Gap. Serum 13  Blood Urea Nitrogen,Serm 7  Calcium, Total Serum 8.0  Carbon Dioxide, Serum 24  Chloride, Serum 99  Creatinine, Serum 1.87  eGFR in  --  eGFR in Non Afican American --  Gloucose, serum 165  Potassium, Serum 4.3  Sodium, Serum 136    CMP  06-03-23 @ 05:30  Graciela Aminotransferase(ALT/SGPT)24  Albumin, Serum 3.2  Alkaline Phosphatase, Serum 96  Anion Gap, Serum 7  Aspartate Aminotransferase (AST/SGOT)23  Bilirubin Total, Serum 0.4  Blood Urea Nitrogen, Serum 11  Calcium,Total Serum 7.9  Carbon Dioxide, Serum 22  Chloride, Serum 107  Creatinine, Serum 1.34  eGFR if  --  eGFR if Non African American --  Glucose, Serum 222  Potassium, Serum 4.6  Protein Total, Serum 5.6  Sodium, Serum 136    06-02-23 @ 18:26  Graciela Aminotransferase(ALT/SGPT)23  Albumin, Serum 3.0  Alkaline Phosphatase, Serum 91  Anion Gap, Serum 10  Aspartate Aminotransferase (AST/SGOT)27  Bilirubin Total, Serum 0.4  Blood Urea Nitrogen, Serum 11  Calcium,Total Serum 7.3  Carbon Dioxide, Serum 21  Chloride, Serum 103  Creatinine, Serum 1.36  eGFR if  --  eGFR if Non African American --  Glucose, Serum 368  Potassium, Serum 4.8  Protein Total, Serum 5.2  Sodium, Serum 134    06-02-23 @ 12:13  Graciela Aminotransferase(ALT/SGPT)28  Albumin, Serum 3.2  Alkaline Phosphatase, Serum 110  Anion Gap, Serum 15  Aspartate Aminotransferase (AST/SGOT)28  Bilirubin Total, Serum 0.5  Blood Urea Nitrogen, Serum 10  Calcium,Total Serum 8.4  Carbon Dioxide, Serum 18  Chloride, Serum 105  Creatinine, Serum 1.34  eGFR if  --  eGFR if Non African American --  Glucose, Serum 268  Potassium, Serum 4.5  Protein Total, Serum 6.3  Sodium, Serum 138    06-02-23 @ 05:30  Graciela Aminotransferase(ALT/SGPT)23  Albumin, Serum 2.9  Alkaline Phosphatase, Serum 96  Anion Gap, Serum 15  Aspartate Aminotransferase (AST/SGOT)25  Bilirubin Total, Serum 0.4  Blood Urea Nitrogen, Serum 9  Calcium,Total Serum 7.5  Carbon Dioxide, Serum 18  Chloride, Serum 106  Creatinine, Serum 1.32  eGFR if  --  eGFR if Non African American --  Glucose, Serum 276  Potassium, Serum 4.7  Protein Total, Serum 5.2  Sodium, Serum 139    06-01-23 @ 13:20  Graciela Aminotransferase(ALT/SGPT)28  Albumin, Serum 3.3  Alkaline Phosphatase, Serum 109  Anion Gap, Serum 13  Aspartate Aminotransferase (AST/SGOT)38  Bilirubin Total, Serum 0.7  Blood Urea Nitrogen, Serum 7  Calcium,Total Serum 8.0  Carbon Dioxide, Serum 24  Chloride, Serum 99  Creatinine, Serum 1.87  eGFR if  --  eGFR if Non African American --  Glucose, Serum 165  Potassium, Serum 4.3  Protein Total, Serum 5.7  Sodium, Serum 136    PT/INR  PT/INR  06-01-23 @ 13:20  INR 1.07  Prothrombin Time Comment --  Prothrobin Time, [id number]    RADIOLOGY & ADDITIONAL TESTS: Reviewed

## 2023-06-04 DIAGNOSIS — N39.0 URINARY TRACT INFECTION, SITE NOT SPECIFIED: ICD-10-CM

## 2023-06-04 DIAGNOSIS — D72.829 ELEVATED WHITE BLOOD CELL COUNT, UNSPECIFIED: ICD-10-CM

## 2023-06-04 LAB
ALBUMIN SERPL ELPH-MCNC: 3 G/DL — LOW (ref 3.3–5)
ALP SERPL-CCNC: 85 U/L — SIGNIFICANT CHANGE UP (ref 40–120)
ALT FLD-CCNC: 24 U/L — SIGNIFICANT CHANGE UP (ref 10–45)
ANION GAP SERPL CALC-SCNC: 10 MMOL/L — SIGNIFICANT CHANGE UP (ref 5–17)
AST SERPL-CCNC: 33 U/L — SIGNIFICANT CHANGE UP (ref 10–40)
BASOPHILS # BLD AUTO: 0.02 K/UL — SIGNIFICANT CHANGE UP (ref 0–0.2)
BASOPHILS NFR BLD AUTO: 0.2 % — SIGNIFICANT CHANGE UP (ref 0–2)
BILIRUB SERPL-MCNC: 0.3 MG/DL — SIGNIFICANT CHANGE UP (ref 0.2–1.2)
BUN SERPL-MCNC: 8 MG/DL — SIGNIFICANT CHANGE UP (ref 7–23)
CALCIUM SERPL-MCNC: 8 MG/DL — LOW (ref 8.4–10.5)
CHLORIDE SERPL-SCNC: 109 MMOL/L — HIGH (ref 96–108)
CO2 SERPL-SCNC: 22 MMOL/L — SIGNIFICANT CHANGE UP (ref 22–31)
CREAT SERPL-MCNC: 1.09 MG/DL — SIGNIFICANT CHANGE UP (ref 0.5–1.3)
EGFR: 76 ML/MIN/1.73M2 — SIGNIFICANT CHANGE UP
EOSINOPHIL # BLD AUTO: 0 K/UL — SIGNIFICANT CHANGE UP (ref 0–0.5)
EOSINOPHIL NFR BLD AUTO: 0 % — SIGNIFICANT CHANGE UP (ref 0–6)
GLUCOSE BLDC GLUCOMTR-MCNC: 134 MG/DL — HIGH (ref 70–99)
GLUCOSE BLDC GLUCOMTR-MCNC: 143 MG/DL — HIGH (ref 70–99)
GLUCOSE BLDC GLUCOMTR-MCNC: 172 MG/DL — HIGH (ref 70–99)
GLUCOSE BLDC GLUCOMTR-MCNC: 209 MG/DL — HIGH (ref 70–99)
GLUCOSE SERPL-MCNC: 178 MG/DL — HIGH (ref 70–99)
HCT VFR BLD CALC: 34.9 % — LOW (ref 39–50)
HGB BLD-MCNC: 11.7 G/DL — LOW (ref 13–17)
IMM GRANULOCYTES NFR BLD AUTO: 1.6 % — HIGH (ref 0–0.9)
LYMPHOCYTES # BLD AUTO: 2.89 K/UL — SIGNIFICANT CHANGE UP (ref 1–3.3)
LYMPHOCYTES # BLD AUTO: 29 % — SIGNIFICANT CHANGE UP (ref 13–44)
MAGNESIUM SERPL-MCNC: 1.9 MG/DL — SIGNIFICANT CHANGE UP (ref 1.6–2.6)
MCHC RBC-ENTMCNC: 31 PG — SIGNIFICANT CHANGE UP (ref 27–34)
MCHC RBC-ENTMCNC: 33.5 GM/DL — SIGNIFICANT CHANGE UP (ref 32–36)
MCV RBC AUTO: 92.6 FL — SIGNIFICANT CHANGE UP (ref 80–100)
MONOCYTES # BLD AUTO: 0.39 K/UL — SIGNIFICANT CHANGE UP (ref 0–0.9)
MONOCYTES NFR BLD AUTO: 3.9 % — SIGNIFICANT CHANGE UP (ref 2–14)
NEUTROPHILS # BLD AUTO: 6.49 K/UL — SIGNIFICANT CHANGE UP (ref 1.8–7.4)
NEUTROPHILS NFR BLD AUTO: 65.3 % — SIGNIFICANT CHANGE UP (ref 43–77)
NRBC # BLD: 0 /100 WBCS — SIGNIFICANT CHANGE UP (ref 0–0)
PHOSPHATE SERPL-MCNC: 2.1 MG/DL — LOW (ref 2.5–4.5)
PLATELET # BLD AUTO: 219 K/UL — SIGNIFICANT CHANGE UP (ref 150–400)
POTASSIUM SERPL-MCNC: 4 MMOL/L — SIGNIFICANT CHANGE UP (ref 3.5–5.3)
POTASSIUM SERPL-SCNC: 4 MMOL/L — SIGNIFICANT CHANGE UP (ref 3.5–5.3)
PROT SERPL-MCNC: 5 G/DL — LOW (ref 6–8.3)
RBC # BLD: 3.77 M/UL — LOW (ref 4.2–5.8)
RBC # FLD: 15.3 % — HIGH (ref 10.3–14.5)
SODIUM SERPL-SCNC: 141 MMOL/L — SIGNIFICANT CHANGE UP (ref 135–145)
WBC # BLD: 9.95 K/UL — SIGNIFICANT CHANGE UP (ref 3.8–10.5)
WBC # FLD AUTO: 9.95 K/UL — SIGNIFICANT CHANGE UP (ref 3.8–10.5)

## 2023-06-04 RX ORDER — SODIUM CHLORIDE 9 MG/ML
1000 INJECTION INTRAMUSCULAR; INTRAVENOUS; SUBCUTANEOUS
Refills: 0 | Status: DISCONTINUED | OUTPATIENT
Start: 2023-06-04 | End: 2023-06-04

## 2023-06-04 RX ADMIN — LATANOPROST 1 DROP(S): 0.05 SOLUTION/ DROPS OPHTHALMIC; TOPICAL at 22:28

## 2023-06-04 RX ADMIN — Medication 4: at 13:47

## 2023-06-04 RX ADMIN — BRIMONIDINE TARTRATE 1 DROP(S): 2 SOLUTION/ DROPS OPHTHALMIC at 22:28

## 2023-06-04 RX ADMIN — HEPARIN SODIUM 7500 UNIT(S): 5000 INJECTION INTRAVENOUS; SUBCUTANEOUS at 06:16

## 2023-06-04 RX ADMIN — ATORVASTATIN CALCIUM 40 MILLIGRAM(S): 80 TABLET, FILM COATED ORAL at 22:27

## 2023-06-04 RX ADMIN — Medication 2: at 09:21

## 2023-06-04 RX ADMIN — FLUDROCORTISONE ACETATE 0.2 MILLIGRAM(S): 0.1 TABLET ORAL at 06:16

## 2023-06-04 RX ADMIN — BRIMONIDINE TARTRATE 1 DROP(S): 2 SOLUTION/ DROPS OPHTHALMIC at 16:00

## 2023-06-04 RX ADMIN — BRIMONIDINE TARTRATE 1 DROP(S): 2 SOLUTION/ DROPS OPHTHALMIC at 06:16

## 2023-06-04 RX ADMIN — Medication 5 MILLIGRAM(S): at 06:15

## 2023-06-04 RX ADMIN — Medication 2 UNIT(S): at 18:19

## 2023-06-04 RX ADMIN — DORZOLAMIDE HYDROCHLORIDE 1 DROP(S): 20 SOLUTION/ DROPS OPHTHALMIC at 06:16

## 2023-06-04 RX ADMIN — HEPARIN SODIUM 7500 UNIT(S): 5000 INJECTION INTRAVENOUS; SUBCUTANEOUS at 16:00

## 2023-06-04 RX ADMIN — CEFTRIAXONE 100 MILLIGRAM(S): 500 INJECTION, POWDER, FOR SOLUTION INTRAMUSCULAR; INTRAVENOUS at 09:20

## 2023-06-04 RX ADMIN — INSULIN GLARGINE 7 UNIT(S): 100 INJECTION, SOLUTION SUBCUTANEOUS at 22:28

## 2023-06-04 RX ADMIN — Medication 2 UNIT(S): at 13:47

## 2023-06-04 RX ADMIN — Medication 2 UNIT(S): at 09:21

## 2023-06-04 RX ADMIN — DORZOLAMIDE HYDROCHLORIDE 1 DROP(S): 20 SOLUTION/ DROPS OPHTHALMIC at 18:18

## 2023-06-04 RX ADMIN — HEPARIN SODIUM 7500 UNIT(S): 5000 INJECTION INTRAVENOUS; SUBCUTANEOUS at 22:27

## 2023-06-04 NOTE — PROGRESS NOTE ADULT - SUBJECTIVE AND OBJECTIVE BOX
SUBJECTIVE / INTERVAL HPI: Patient seen and examined at bedside. Feeling better as compared to Friday. Denying lightheadedness, dizziness, chest pain, shortness of breath, abdominal pain, nausea, vomiting, constipation, diarrhea, lower extremity pain.     PHYSICAL EXAM:    General: WDWN  HEENT: NC/AT; PERRL, anicteric sclera; MMM  Neck: supple  Cardiovascular: +S1/S2, RRR  Respiratory: CTA B/L; no W/R/R  Gastrointestinal: soft, NT/ND; +BSx4  Extremities: WWP; no edema, clubbing or cyanosis  Vascular: 2+ radial, DP/PT pulses B/L  Neurological: AAOx3; no focal deficits  Psychiatric: pleasant mood and affect  Dermatologic: no appreciable wounds or damage to the skin    VITAL SIGNS:  Vital Signs Last 24 Hrs  T(C): 36.4 (04 Jun 2023 12:58), Max: 36.7 (03 Jun 2023 20:33)  T(F): 97.5 (04 Jun 2023 12:58), Max: 98 (03 Jun 2023 20:33)  HR: 73 (04 Jun 2023 12:58) (73 - 88)  BP: 130/86 (04 Jun 2023 12:58) (97/64 - 130/86)  BP(mean): 101 (04 Jun 2023 12:58) (101 - 101)  RR: 17 (04 Jun 2023 12:58) (16 - 17)  SpO2: 95% (04 Jun 2023 12:58) (93% - 96%)    Parameters below as of 04 Jun 2023 12:58  Patient On (Oxygen Delivery Method): room air          MEDICATIONS:  MEDICATIONS  (STANDING):  atorvastatin 40 milliGRAM(s) Oral at bedtime  brimonidine 0.2% Ophthalmic Solution 1 Drop(s) Both EYES three times a day  cefTRIAXone   IVPB 2000 milliGRAM(s) IV Intermittent every 24 hours  dextrose 5%. 1000 milliLiter(s) (100 mL/Hr) IV Continuous <Continuous>  dextrose 5%. 1000 milliLiter(s) (50 mL/Hr) IV Continuous <Continuous>  dextrose 50% Injectable 12.5 Gram(s) IV Push once  dextrose 50% Injectable 25 Gram(s) IV Push once  dextrose 50% Injectable 25 Gram(s) IV Push once  dorzolamide 2% Ophthalmic Solution 1 Drop(s) Both EYES <User Schedule>  fludroCORTISONE 0.2 milliGRAM(s) Oral daily  glucagon  Injectable 1 milliGRAM(s) IntraMuscular once  heparin   Injectable 7500 Unit(s) SubCutaneous every 8 hours  insulin glargine Injectable (LANTUS) 7 Unit(s) SubCutaneous at bedtime  insulin lispro (ADMELOG) corrective regimen sliding scale   SubCutaneous Before meals and at bedtime  insulin lispro Injectable (ADMELOG) 2 Unit(s) SubCutaneous three times a day before meals  latanoprost 0.005% Ophthalmic Solution 1 Drop(s) Both EYES at bedtime  magnesium sulfate  IVPB 4 Gram(s) IV Intermittent once  predniSONE   Tablet 5 milliGRAM(s) Oral daily  sodium chloride 0.9%. 1000 milliLiter(s) (100 mL/Hr) IV Continuous <Continuous>    MEDICATIONS  (PRN):  dextrose Oral Gel 15 Gram(s) Oral once PRN Blood Glucose LESS THAN 70 milliGRAM(s)/deciliter      ALLERGIES:  Allergies    penicillin (Rash)    Intolerances        LABS:                        11.7   9.95  )-----------( 219      ( 04 Jun 2023 06:55 )             34.9     06-04    141  |  109<H>  |  8   ----------------------------<  178<H>  4.0   |  22  |  1.09    Ca    8.0<L>      04 Jun 2023 06:55  Phos  2.1     06-04  Mg     1.9     06-04    TPro  5.0<L>  /  Alb  3.0<L>  /  TBili  0.3  /  DBili  x   /  AST  33  /  ALT  24  /  AlkPhos  85  06-04        CAPILLARY BLOOD GLUCOSE      POCT Blood Glucose.: 209 mg/dL (04 Jun 2023 13:01)      RADIOLOGY & ADDITIONAL TESTS: Reviewed.

## 2023-06-04 NOTE — PROGRESS NOTE ADULT - PROBLEM SELECTOR PLAN 10
F: S/p 3L in ED, NS gtt   E: Replete PRN K>3.5, Mg > 2   GI: Omeprazole 40mg at home   Diet: Consistent Carb   DVT: Heparin SubQ   Code: Full code

## 2023-06-04 NOTE — PROGRESS NOTE ADULT - PROBLEM SELECTOR PLAN 6
RESOLVED  Pt with BUN/Cr of 7/1.87 on admission, unclear baseline. Pt's PCP is located at [facility] [phone] but was unable to be reached at time of admission --  left. Suspecting pre-renal etiology i/s/o hypotension, however cannot exclude CKD given lack of collateral .  - Cr downtrending to 1.09    Plan:  - C/w maintenance fluids  - Continue to monitor creatinine

## 2023-06-04 NOTE — PROGRESS NOTE ADULT - PROBLEM SELECTOR PLAN 5
Pt states he has cancer of adenoids and sees Dr. Sims outpatient. States he is on prednisone and florinef for chronic ophthalmoplegia.      Plan:   - Continue with home medications for opthalmoplegia  - Continue with follow up outpatient

## 2023-06-04 NOTE — PROGRESS NOTE ADULT - PROBLEM SELECTOR PLAN 4
Has elevated white count on admission which has now improved after initiation of antibiotics for UTI.    Plan:  - C/w abx for UTI as above  - Continue to monitor WBC

## 2023-06-04 NOTE — PROGRESS NOTE ADULT - PROBLEM SELECTOR PLAN 2
RESOLVING  Pt presented with initial BP of 58/30 with HR of 90, which transiently reached 108 in ED. Pt not meeting SIRS criteria, as pt had WBC of 9.82, RR 18, and afebrile, however pt meeting qSofa for AMS (somnolence) and low systolic BP. Possible etiologies include sepsis, hemorrhagic shock (though less likely given lack of bleeding), medication induced hypotension. Pt s/p Vancomycin 2g and Cefepime 2g in ED. UA negative. CXR showed no pneumonias.   - S/p NS infusion  - Stated to have had hypotension prior to outpatient radiation    Plan:   - Continue to monitor vitals

## 2023-06-04 NOTE — PROGRESS NOTE ADULT - PROBLEM SELECTOR PLAN 1
Baseline AAOx3 but AAOx1 on admission. Concern for hypotension vs infection vs trauma vs brain metastases vs DKA iso elevated BHB, lactic acid, AG vs hypercarbic encephalopathy vs hypoxia  - CT head obtained with no hemorrhage and metastases appreciated  - White count elevated on 6/2 but on outpatient records has intermittent elevated white count iso cancer  - ABG obtained revealing ph of 7.4, pco2 of 31, and Po2 of 74  - Most recent glucose at 234 which appears consistent with outpatient glucose levels  - 6/4 patient improved, mental status back to baseline at AA0x3.     Plan:  - Continue to monitor mental status      - Follow up 6 pm CMP

## 2023-06-04 NOTE — PROGRESS NOTE ADULT - PROBLEM SELECTOR PLAN 7
Pt receives Dosolimide 2% BID, Brimonidine 0.15% TID, and Latoprost eyedrops at home. Pt reportedly blind vs visually impaired as per spouse.     Plan:   - C/w home meds

## 2023-06-04 NOTE — PROGRESS NOTE ADULT - ATTENDING COMMENTS
61 yo M with a PMH of adenoid CA currently undergoing radiation, HTN, glaucoma, GERD, DM, cataracts who presented with AMS, hypotension with SBP in 50's during radiation with concomitant suspected prerenal ROBYN. CT Head ruled out anatomical etiology for MAS, but it may be metabolic in nature. Patient should continue with IVF resuscitation, while continuing to monitor BMP. Patient was found to have Enterococcal UTI so started on Ceftriaxone, with leukocytosis now resolved - c/t monitor WBC and fever curve. His oncologist Dr. Sims stated that the ophthalmoplegia was chronic due to his cancer and that we maintain him on maintenance dosing of Florinef and Prednisone.
63 yo M with a PMH of adenoid CA currently undergoing radiation, HTN, glaucoma, GERD, DM, cataracts who presented with AMS, hypotension with SBP in 50's during radiation with concomitant suspected prerenal ORBYN. CT Head ruled out anatomical etiology for MAS, but it may be metabolic in nature. Patient should continue with IVF resuscitation. If AMS does not improve, may need to consider LP to rule our infectious cause, although unlikely. Regular neuro checks.

## 2023-06-04 NOTE — PROGRESS NOTE ADULT - PROBLEM SELECTOR PLAN 8
Pt's wife states pt takes only Metformin at home. Collateral obtained with [facility] states pt is also on Basaglar Insulin 10U at bedtime, Novolog Flexpen 2U TID, in addition to Metformin 500mg QD.   - A1c 9.7  - Elevated BHB at 2.6, w/ AG at 15 but glucose at 238 which appears consistent with outpatient glucose levels    Plan:   - mISS with consistent carb diet   - C/w lantus 7U at bedtime   - C/w admelog 2U premeal TID

## 2023-06-05 ENCOUNTER — TRANSCRIPTION ENCOUNTER (OUTPATIENT)
Age: 63
End: 2023-06-05

## 2023-06-05 VITALS
DIASTOLIC BLOOD PRESSURE: 88 MMHG | HEART RATE: 66 BPM | TEMPERATURE: 99 F | RESPIRATION RATE: 18 BRPM | SYSTOLIC BLOOD PRESSURE: 159 MMHG | OXYGEN SATURATION: 99 %

## 2023-06-05 DIAGNOSIS — Z71.89 OTHER SPECIFIED COUNSELING: ICD-10-CM

## 2023-06-05 DIAGNOSIS — Z51.5 ENCOUNTER FOR PALLIATIVE CARE: ICD-10-CM

## 2023-06-05 DIAGNOSIS — R53.81 OTHER MALAISE: ICD-10-CM

## 2023-06-05 DIAGNOSIS — G93.41 METABOLIC ENCEPHALOPATHY: ICD-10-CM

## 2023-06-05 DIAGNOSIS — G89.3 NEOPLASM RELATED PAIN (ACUTE) (CHRONIC): ICD-10-CM

## 2023-06-05 LAB
ALBUMIN SERPL ELPH-MCNC: 3 G/DL — LOW (ref 3.3–5)
ALP SERPL-CCNC: 86 U/L — SIGNIFICANT CHANGE UP (ref 40–120)
ALT FLD-CCNC: 34 U/L — SIGNIFICANT CHANGE UP (ref 10–45)
ANION GAP SERPL CALC-SCNC: 10 MMOL/L — SIGNIFICANT CHANGE UP (ref 5–17)
AST SERPL-CCNC: 71 U/L — HIGH (ref 10–40)
BASOPHILS # BLD AUTO: 0.05 K/UL — SIGNIFICANT CHANGE UP (ref 0–0.2)
BASOPHILS NFR BLD AUTO: 0.6 % — SIGNIFICANT CHANGE UP (ref 0–2)
BILIRUB SERPL-MCNC: 0.3 MG/DL — SIGNIFICANT CHANGE UP (ref 0.2–1.2)
BUN SERPL-MCNC: 7 MG/DL — SIGNIFICANT CHANGE UP (ref 7–23)
CALCIUM SERPL-MCNC: 8.5 MG/DL — SIGNIFICANT CHANGE UP (ref 8.4–10.5)
CHLORIDE SERPL-SCNC: 107 MMOL/L — SIGNIFICANT CHANGE UP (ref 96–108)
CO2 SERPL-SCNC: 25 MMOL/L — SIGNIFICANT CHANGE UP (ref 22–31)
CREAT SERPL-MCNC: 1.14 MG/DL — SIGNIFICANT CHANGE UP (ref 0.5–1.3)
EGFR: 72 ML/MIN/1.73M2 — SIGNIFICANT CHANGE UP
EOSINOPHIL # BLD AUTO: 0.06 K/UL — SIGNIFICANT CHANGE UP (ref 0–0.5)
EOSINOPHIL NFR BLD AUTO: 0.8 % — SIGNIFICANT CHANGE UP (ref 0–6)
GLUCOSE BLDC GLUCOMTR-MCNC: 137 MG/DL — HIGH (ref 70–99)
GLUCOSE BLDC GLUCOMTR-MCNC: 148 MG/DL — HIGH (ref 70–99)
GLUCOSE SERPL-MCNC: 123 MG/DL — HIGH (ref 70–99)
HCT VFR BLD CALC: 35.9 % — LOW (ref 39–50)
HGB BLD-MCNC: 11.9 G/DL — LOW (ref 13–17)
IMM GRANULOCYTES NFR BLD AUTO: 1.3 % — HIGH (ref 0–0.9)
LYMPHOCYTES # BLD AUTO: 3.79 K/UL — HIGH (ref 1–3.3)
LYMPHOCYTES # BLD AUTO: 47.9 % — HIGH (ref 13–44)
MAGNESIUM SERPL-MCNC: 1.7 MG/DL — SIGNIFICANT CHANGE UP (ref 1.6–2.6)
MCHC RBC-ENTMCNC: 31 PG — SIGNIFICANT CHANGE UP (ref 27–34)
MCHC RBC-ENTMCNC: 33.1 GM/DL — SIGNIFICANT CHANGE UP (ref 32–36)
MCV RBC AUTO: 93.5 FL — SIGNIFICANT CHANGE UP (ref 80–100)
MONOCYTES # BLD AUTO: 0.54 K/UL — SIGNIFICANT CHANGE UP (ref 0–0.9)
MONOCYTES NFR BLD AUTO: 6.8 % — SIGNIFICANT CHANGE UP (ref 2–14)
NEUTROPHILS # BLD AUTO: 3.38 K/UL — SIGNIFICANT CHANGE UP (ref 1.8–7.4)
NEUTROPHILS NFR BLD AUTO: 42.6 % — LOW (ref 43–77)
NRBC # BLD: 0 /100 WBCS — SIGNIFICANT CHANGE UP (ref 0–0)
PHOSPHATE SERPL-MCNC: 2.1 MG/DL — LOW (ref 2.5–4.5)
PLATELET # BLD AUTO: 216 K/UL — SIGNIFICANT CHANGE UP (ref 150–400)
POTASSIUM SERPL-MCNC: 3.5 MMOL/L — SIGNIFICANT CHANGE UP (ref 3.5–5.3)
POTASSIUM SERPL-SCNC: 3.5 MMOL/L — SIGNIFICANT CHANGE UP (ref 3.5–5.3)
PROT SERPL-MCNC: 5.1 G/DL — LOW (ref 6–8.3)
RBC # BLD: 3.84 M/UL — LOW (ref 4.2–5.8)
RBC # FLD: 15.4 % — HIGH (ref 10.3–14.5)
SODIUM SERPL-SCNC: 142 MMOL/L — SIGNIFICANT CHANGE UP (ref 135–145)
WBC # BLD: 7.92 K/UL — SIGNIFICANT CHANGE UP (ref 3.8–10.5)
WBC # FLD AUTO: 7.92 K/UL — SIGNIFICANT CHANGE UP (ref 3.8–10.5)

## 2023-06-05 PROCEDURE — 96371 SC THER INFUSION RESET PUMP: CPT

## 2023-06-05 PROCEDURE — 84132 ASSAY OF SERUM POTASSIUM: CPT

## 2023-06-05 PROCEDURE — 70450 CT HEAD/BRAIN W/O DYE: CPT | Mod: MA

## 2023-06-05 PROCEDURE — 96376 TX/PRO/DX INJ SAME DRUG ADON: CPT

## 2023-06-05 PROCEDURE — 99497 ADVNCD CARE PLAN 30 MIN: CPT | Mod: 25

## 2023-06-05 PROCEDURE — 71045 X-RAY EXAM CHEST 1 VIEW: CPT

## 2023-06-05 PROCEDURE — 80061 LIPID PANEL: CPT

## 2023-06-05 PROCEDURE — 87186 SC STD MICRODIL/AGAR DIL: CPT

## 2023-06-05 PROCEDURE — 83605 ASSAY OF LACTIC ACID: CPT

## 2023-06-05 PROCEDURE — 83735 ASSAY OF MAGNESIUM: CPT

## 2023-06-05 PROCEDURE — 36415 COLL VENOUS BLD VENIPUNCTURE: CPT

## 2023-06-05 PROCEDURE — 85730 THROMBOPLASTIN TIME PARTIAL: CPT

## 2023-06-05 PROCEDURE — 82803 BLOOD GASES ANY COMBINATION: CPT

## 2023-06-05 PROCEDURE — 84484 ASSAY OF TROPONIN QUANT: CPT

## 2023-06-05 PROCEDURE — 82330 ASSAY OF CALCIUM: CPT

## 2023-06-05 PROCEDURE — 82010 KETONE BODYS QUAN: CPT

## 2023-06-05 PROCEDURE — 96365 THER/PROPH/DIAG IV INF INIT: CPT

## 2023-06-05 PROCEDURE — 85610 PROTHROMBIN TIME: CPT

## 2023-06-05 PROCEDURE — 83036 HEMOGLOBIN GLYCOSYLATED A1C: CPT

## 2023-06-05 PROCEDURE — 96375 TX/PRO/DX INJ NEW DRUG ADDON: CPT

## 2023-06-05 PROCEDURE — 99232 SBSQ HOSP IP/OBS MODERATE 35: CPT

## 2023-06-05 PROCEDURE — 87086 URINE CULTURE/COLONY COUNT: CPT

## 2023-06-05 PROCEDURE — 81003 URINALYSIS AUTO W/O SCOPE: CPT

## 2023-06-05 PROCEDURE — 80053 COMPREHEN METABOLIC PANEL: CPT

## 2023-06-05 PROCEDURE — 0225U NFCT DS DNA&RNA 21 SARSCOV2: CPT

## 2023-06-05 PROCEDURE — 85025 COMPLETE CBC W/AUTO DIFF WBC: CPT

## 2023-06-05 PROCEDURE — 84295 ASSAY OF SERUM SODIUM: CPT

## 2023-06-05 PROCEDURE — 86850 RBC ANTIBODY SCREEN: CPT

## 2023-06-05 PROCEDURE — 86901 BLOOD TYPING SEROLOGIC RH(D): CPT

## 2023-06-05 PROCEDURE — 87040 BLOOD CULTURE FOR BACTERIA: CPT

## 2023-06-05 PROCEDURE — 82962 GLUCOSE BLOOD TEST: CPT

## 2023-06-05 PROCEDURE — 99285 EMERGENCY DEPT VISIT HI MDM: CPT

## 2023-06-05 PROCEDURE — 86900 BLOOD TYPING SEROLOGIC ABO: CPT

## 2023-06-05 PROCEDURE — 84100 ASSAY OF PHOSPHORUS: CPT

## 2023-06-05 RX ORDER — DORZOLAMIDE HYDROCHLORIDE 20 MG/ML
1 SOLUTION/ DROPS OPHTHALMIC
Qty: 0 | Refills: 0 | DISCHARGE
Start: 2023-06-05

## 2023-06-05 RX ORDER — LOSARTAN POTASSIUM 100 MG/1
1 TABLET, FILM COATED ORAL
Qty: 0 | Refills: 0 | DISCHARGE

## 2023-06-05 RX ORDER — DORZOLAMIDE HYDROCHLORIDE TIMOLOL MALEATE 20; 5 MG/ML; MG/ML
1 SOLUTION/ DROPS OPHTHALMIC
Qty: 0 | Refills: 0 | DISCHARGE

## 2023-06-05 RX ADMIN — BRIMONIDINE TARTRATE 1 DROP(S): 2 SOLUTION/ DROPS OPHTHALMIC at 13:17

## 2023-06-05 RX ADMIN — BRIMONIDINE TARTRATE 1 DROP(S): 2 SOLUTION/ DROPS OPHTHALMIC at 06:00

## 2023-06-05 RX ADMIN — Medication 2 UNIT(S): at 09:37

## 2023-06-05 RX ADMIN — Medication 2 UNIT(S): at 13:18

## 2023-06-05 RX ADMIN — DORZOLAMIDE HYDROCHLORIDE 1 DROP(S): 20 SOLUTION/ DROPS OPHTHALMIC at 06:00

## 2023-06-05 RX ADMIN — FLUDROCORTISONE ACETATE 0.2 MILLIGRAM(S): 0.1 TABLET ORAL at 06:00

## 2023-06-05 RX ADMIN — HEPARIN SODIUM 7500 UNIT(S): 5000 INJECTION INTRAVENOUS; SUBCUTANEOUS at 06:00

## 2023-06-05 RX ADMIN — HEPARIN SODIUM 7500 UNIT(S): 5000 INJECTION INTRAVENOUS; SUBCUTANEOUS at 13:18

## 2023-06-05 RX ADMIN — Medication 5 MILLIGRAM(S): at 06:00

## 2023-06-05 RX ADMIN — Medication 1 TABLET(S): at 11:54

## 2023-06-05 RX ADMIN — CEFTRIAXONE 100 MILLIGRAM(S): 500 INJECTION, POWDER, FOR SOLUTION INTRAMUSCULAR; INTRAVENOUS at 09:38

## 2023-06-05 NOTE — PROGRESS NOTE ADULT - PROBLEM SELECTOR PLAN 4
.  hypotensive prior to admission, also suspect element of poor po intake 2/2 cancer . improved.   - no indication for HD  - per primary team

## 2023-06-05 NOTE — DISCHARGE NOTE PROVIDER - NSDCCPCAREPLAN_GEN_ALL_CORE_FT
PRINCIPAL DISCHARGE DIAGNOSIS  Diagnosis: Urinary tract infection  Assessment and Plan of Treatment: Found to have E. facaelis in your urine culture. Please take your antibiotics as indicated, augmentin one tablet twice a day. Please refer to your primary care physician to resume your anti-HTN medications.

## 2023-06-05 NOTE — DISCHARGE NOTE NURSING/CASE MANAGEMENT/SOCIAL WORK - PATIENT PORTAL LINK FT
You can access the FollowMyHealth Patient Portal offered by [facility] by registering at the following website: [email or web address]. By joining [employer]’s FollowMyHealth portal, you will also be able to view your health information using other applications (apps) compatible with our system.

## 2023-06-05 NOTE — PROGRESS NOTE ADULT - CONVERSATION DETAILS
Met with pt and his wife at bedside. Pt weak and tired, deferring to his wife. Used Vietnamese tele  ID [id number]. Introduced the role of palliative medicine for ACP, GOC, and support. Wife describes pts functional decline in recent weeks: pt is mostly bed/chair bound, receiving bedbaths, and needs x2 assist to get him to bathroom. She and her daughter are pts caregivers and having a hard time taking care of him as of recent.     She understands that pts oncologist "is changing his cancer treatment from IV to oral because it is not working." Discussed role of performance status in order to further receive cancer modifying therapies and expressed worry that if his performance status continues to decline, he may be in the final months of his life.  Introduced supportive role, goals, and philosophy of home hospice. Wife appreciative of discussion and understands that this service is available to her  when symptom directed approach is within GOC.     She affirms that pt does not have HCP, but she and her daughter Donna Sinha #[phone] would make decisions collectively for patient. Pt unable/defers to complete at this time due to weakness.  Briefly broached code status, risks/benefits/complications. Continue full code.

## 2023-06-05 NOTE — DISCHARGE NOTE NURSING/CASE MANAGEMENT/SOCIAL WORK - NSDCPEFALRISK_GEN_ALL_CORE
For information on Fall & Injury Prevention, visit: [email or web address] OR  [email or web address] OR  https://www.cdc.gov/steadi/patient.html

## 2023-06-05 NOTE — DISCHARGE NOTE PROVIDER - HOSPITAL COURSE
#Discharge: do not delete    Mr. Robles is a 61 y/o M with a PMHx of adenoid CA currently undergoing radiation by Dr. Sims, HTN, glaucoma, GERD, DM, cataracts. Admitted 2/2 hypotension after being sent from infusion center. Found to have E. facaelis in urine culture from 6/1.     Problem List/Main Diagnoses (system-based):  Problem: Altered mental status. RESOLVED   Baseline AAOx3 but AAOx1 on admission; currently, back to normal baseline. Found to have UTI which could explain AMS.  Plan:  - Continue to monitor mental status    Problem: Hypotension. RESOLVED  Pt presented with initial BP of 58/30 with HR of 90, which transiently reached 108 in ED. Pt developed a leukocytosis but was reported to be on steroids outpatient although urine cx grew E. facaelis. Pt s/p Vancomycin 2g and Cefepime 2g in ED. UA negative. CXR showed no pneumonias.   - S/p NS infusion and maintenance fluids  - Stated to have had hypotension prior to outpatient radiation  Plan:   - Continue to monitor vitals.    Problem: Urinary tract infection.   Patient found to have urine positive for E. Faecalis. CFTX started on 6/3. Transitioned to bactrim on 6/5.    Plan:  C/w bactrim on for 7 days.    Problem: Cancer of adenoid.   Pt states he has cancer of adenoids and sees Dr. Sims outpatient. States he is on prednisone and florinef for chronic ophthalmoplegia.      Plan:   - Continue with home medications for opthalmoplegia  - Continue with follow up outpatient.    Problem: History of glaucoma.   Pt receives Dosolimide 2% BID, Brimonidine 0.15% TID, and Latoprost eyedrops at home. Pt reportedly blind vs visually impaired as per spouse.     Plan:   - C/w home meds.    Problem: Diabetes mellitus.   Pt's wife states pt takes only Metformin at home. Collateral obtained with [facility] states pt is also on Basaglar Insulin 10U at bedtime, Novolog Flexpen 2U TID, in addition to Metformin 500mg QD.     Problem: Hypertension.   Pt has hx of HTN for which he takes several anti-hypertensives: Amlodipine 5mg QD, Losartan 100mg QD, and Metoprolol ER 25mg QD     Plan:   - Restart amlodipine and metoprolol   - Resume losartan upon PCP evaluation       Patient was discharged to: home  New medications: bactrim  Changes to old medications: none  Medications that were stopped: none  Items to follow up as outpatient: none  Cardiovascular: +S1/S2, RRR  Respiratory: CTA B/L; no W/R/R  Gastrointestinal: soft, NT/ND; +BSx4  Extremities: WWP; no edema, clubbing or cyanosis  Vascular: 2+ radial, DP/PT pulses B/L  Neurological: AAOx3; no focal deficits  Psychiatric: pleasant mood and affect  Dermatologic: no appreciable wounds or damage to the skin

## 2023-06-05 NOTE — PROGRESS NOTE ADULT - PROBLEM SELECTOR PLAN 6
.  - GO above  - full code  - Jennifer Palacio [phone]  - Donna Sinha #[phone]     In addition to the E/M visit, an advance care planning meeting was performed. Start time: 415 End time:445 ; Total time: 30 min. A face to face meeting to discuss advance care planning was held today regarding: Travis Sinha    Primary decision maker:  Patient is unable to participate in decision making;  Alternate/surrogate:  Jennifer  . Discussed advance directives including, but not limited to, healthcare proxy and code status, as well as disease trajectory, patient's values/goals, and health care options that are available for end of life care. Decision regarding code status:  FULL CODE; Documentation completed today:l [facility] note

## 2023-06-05 NOTE — PROGRESS NOTE ADULT - PROBLEM SELECTOR PLAN 1
.  iso cancer on active treatment, body habitus pps 50% requires assistance with ADLs. per discussion with wife today, pt is mostly bed/chair bound, x2 assist to BR, receives bed baths  - wife and daughter are primary caregivers   - cw supportive care, encourage OOB as deja  - PT

## 2023-06-05 NOTE — PROGRESS NOTE ADULT - TIME BILLING
Emotional Support/Supportive Care and Clarification of Potential Disease Trajectory related to adenoid cancer   Exploration of GOC including advanced directives such as HCP designation and code status

## 2023-06-05 NOTE — PROGRESS NOTE ADULT - SUBJECTIVE AND OBJECTIVE BOX
SUBJECTIVE/OBJECTIVE: interval events noted. clinically improved and is pending discharge home today. pt seen and examined, sitting up, NAD. wife at bedside. Comprehensive ROS as noted below. Exploration of GOC documented as below.      ALLERGIES: penicillin (Rash)      MEDICATIONS: REVIEWED  MEDICATIONS  (STANDING):  atorvastatin 40 milliGRAM(s) Oral at bedtime  brimonidine 0.2% Ophthalmic Solution 1 Drop(s) Both EYES three times a day  dextrose 5%. 1000 milliLiter(s) (50 mL/Hr) IV Continuous <Continuous>  dextrose 5%. 1000 milliLiter(s) (100 mL/Hr) IV Continuous <Continuous>  dextrose 50% Injectable 25 Gram(s) IV Push once  dextrose 50% Injectable 25 Gram(s) IV Push once  dextrose 50% Injectable 12.5 Gram(s) IV Push once  dorzolamide 2% Ophthalmic Solution 1 Drop(s) Both EYES <User Schedule>  fludroCORTISONE 0.2 milliGRAM(s) Oral daily  glucagon  Injectable 1 milliGRAM(s) IntraMuscular once  heparin   Injectable 7500 Unit(s) SubCutaneous every 8 hours  insulin glargine Injectable (LANTUS) 7 Unit(s) SubCutaneous at bedtime  insulin lispro (ADMELOG) corrective regimen sliding scale   SubCutaneous Before meals and at bedtime  insulin lispro Injectable (ADMELOG) 2 Unit(s) SubCutaneous three times a day before meals  latanoprost 0.005% Ophthalmic Solution 1 Drop(s) Both EYES at bedtime  magnesium sulfate  IVPB 4 Gram(s) IV Intermittent once  predniSONE   Tablet 5 milliGRAM(s) Oral daily  trimethoprim  160 mG/sulfamethoxazole 800 mG 1 Tablet(s) Oral every 12 hours    MEDICATIONS  (PRN):  dextrose Oral Gel 15 Gram(s) Oral once PRN Blood Glucose LESS THAN 70 milliGRAM(s)/deciliter      Analgesic Use (Scheduled and PRNs) for past 24 hours (6a-6a):  - none         T(C): 37.1 (06-05-23 @ 12:00), Max: 37.1 (06-05-23 @ 12:00)  HR: 66 (06-05-23 @ 12:00) (66 - 73)  BP: 159/88 (06-05-23 @ 12:00) (127/75 - 159/88)  RR: 18 (06-05-23 @ 12:00) (17 - 18)  SpO2: 99% (06-05-23 @ 12:00) (95% - 99%)  Wt(kg): --    CRITICAL CARE:  [ ]Shock Present  [ ]Septic [ ]Cardiogenic [ ]Neurologic [ ]Hypovolemic    [ ]Vasopressors [ ]Inotropes    [ ]Respiratory failure present   [ ]Mechanical Ventilation   [  ]Non-invasive ventilatory support   [ ]High-Flow  [ ]Acute  [ ]Chronic   [ ]Hypoxic  [ ]Hypercarbic   [ ]Other  [ ]Other organ failure     LABS: REVIEWED  CBC:                        11.9   7.92  )-----------( 216      ( 05 Jun 2023 07:55 )             35.9     CMP:    06-05    142  |  107  |  7   ----------------------------<  123<H>  3.5   |  25  |  1.14    Ca    8.5      05 Jun 2023 07:55  Phos  2.1     06-05  Mg     1.7     06-05    TPro  5.1<L>  /  Alb  3.0<L>  /  TBili  0.3  /  DBili  x   /  AST  71<H>  /  ALT  34  /  AlkPhos  86  06-05      RADIOLOGY & ADDITIONAL STUDIES:     DISCUSSION OF CASE:    CARE COORDINATION:      SUBJECTIVE/OBJECTIVE: interval events noted. clinically improved and is pending discharge home today. pt seen and examined, sitting up, NAD. wife at bedside. Comprehensive ROS as noted below. Exploration of GOC documented as below.      ALLERGIES: penicillin (Rash)      MEDICATIONS: REVIEWED  MEDICATIONS  (STANDING):  atorvastatin 40 milliGRAM(s) Oral at bedtime  brimonidine 0.2% Ophthalmic Solution 1 Drop(s) Both EYES three times a day  dextrose 5%. 1000 milliLiter(s) (50 mL/Hr) IV Continuous <Continuous>  dextrose 5%. 1000 milliLiter(s) (100 mL/Hr) IV Continuous <Continuous>  dextrose 50% Injectable 25 Gram(s) IV Push once  dextrose 50% Injectable 25 Gram(s) IV Push once  dextrose 50% Injectable 12.5 Gram(s) IV Push once  dorzolamide 2% Ophthalmic Solution 1 Drop(s) Both EYES <User Schedule>  fludroCORTISONE 0.2 milliGRAM(s) Oral daily  glucagon  Injectable 1 milliGRAM(s) IntraMuscular once  heparin   Injectable 7500 Unit(s) SubCutaneous every 8 hours  insulin glargine Injectable (LANTUS) 7 Unit(s) SubCutaneous at bedtime  insulin lispro (ADMELOG) corrective regimen sliding scale   SubCutaneous Before meals and at bedtime  insulin lispro Injectable (ADMELOG) 2 Unit(s) SubCutaneous three times a day before meals  latanoprost 0.005% Ophthalmic Solution 1 Drop(s) Both EYES at bedtime  magnesium sulfate  IVPB 4 Gram(s) IV Intermittent once  predniSONE   Tablet 5 milliGRAM(s) Oral daily  trimethoprim  160 mG/sulfamethoxazole 800 mG 1 Tablet(s) Oral every 12 hours    MEDICATIONS  (PRN):  dextrose Oral Gel 15 Gram(s) Oral once PRN Blood Glucose LESS THAN 70 milliGRAM(s)/deciliter    PRESENT SYMPTOMS:   [x ]Unable to obtain due to poor mentation   Source if other than patient:  [x ]Wife   [ ]Team     Pain [ x ]  Location :    head, base of skull     Improves with:  home  fent 25 mcg/hr, garcia 100 mg PO TID, oxy IR 15 mg PRN    Analgesic Use (Scheduled and PRNs) for past 24 hours (6a-6a):  - none   - home regimen held on admission     Dyspnea:         [  ]  Anxiety:           [  ]  Difficulty sleeping: [  ]  Fatigue:           [ x ] generalized weakness, progressive in the last several weeks   Nausea:           [  ]  Loss of appetite:     [  ]  Dysphagia: [  ]  Constipation:   [  ]        Grief Present   [  ] Yes   [  ] No   Other Symptoms: mostly chair bound, needs WC to get pt to and from bathroom, pt receiving bedbaths, unable to walk to shower     All other review of systems negative [  ]    ECOG Performance:     3  Current Palliative Performance Scale/Karnofsky Score:    %  Preadmit Karnofsky:   %    All other review of systems negative [  ]    ECOG Performance:     3  Current Palliative Performance Scale/Karnofsky Score:  40   %  Preadmit Karnofsky:  40 %          PEx:  General: Obese man, sitting up in bed, NAD, more alert compared to previous exam   HEENT: L ophthalmoplegia, R eye heavy with patient having to open it up manually throughout conversation   Cardiovascular: +S1/S2, RRR  Respiratory: CTA B/L; no W/R/R  Gastrointestinal: soft, protuberant, , NT; +BSx4  Extremities: WWP; no edema, clubbing or cyanosis  Vascular: 2+ radial, PT pulses B/L  Neurological: AAOx2, improved mentation and attention compared to Friday     T(C): 37.1 (06-05-23 @ 12:00), Max: 37.1 (06-05-23 @ 12:00)  HR: 66 (06-05-23 @ 12:00) (66 - 73)  BP: 159/88 (06-05-23 @ 12:00) (127/75 - 159/88)  RR: 18 (06-05-23 @ 12:00) (17 - 18)  SpO2: 99% (06-05-23 @ 12:00) (95% - 99%)  Wt(kg): --      [ ]Other organ failure     LABS: REVIEWED  CBC:                        11.9   7.92  )-----------( 216      ( 05 Jun 2023 07:55 )             35.9     CMP:    06-05    142  |  107  |  7   ----------------------------<  123<H>  3.5   |  25  |  1.14    Ca    8.5      05 Jun 2023 07:55  Phos  2.1     06-05  Mg     1.7     06-05    TPro  5.1<L>  /  Alb  3.0<L>  /  TBili  0.3  /  DBili  x   /  AST  71<H>  /  ALT  34  /  AlkPhos  86  06-05      RADIOLOGY & ADDITIONAL STUDIES:   - none    DISCUSSION OF CASE:  - discussed GOC with wife as noted below     CARE COORDINATION:   - pending discharge home today

## 2023-06-05 NOTE — PROGRESS NOTE ADULT - PROBLEM SELECTOR PLAN 2
.  pt overtly comfortable without ss withdrawal.  per iSTOP and chart review: fent 25 mcg/hr, garcia 100 mg PO TID, oxy IR 15 mg PRN  - not on home regimen  - recommend restarting otherwise monitor s/s withdrawal

## 2023-06-05 NOTE — PROGRESS NOTE ADULT - PROBLEM SELECTOR PLAN 3
.  baseline AOx3. AOx1 at admission. +UTI and prerenal ROBYN, likely due to poor PO intake. resolved.   - CT reviewed  - med mgmt per primary team

## 2023-06-05 NOTE — PROGRESS NOTE ADULT - ASSESSMENT
61 yo M with a PMH of adenoid CA currently undergoing radiation, HTN, glaucoma, GERD, DM, cataracts who presented with AMS, hypotension with SBP in 50's during radiation with concomitant suspected prerenal ROBYN. CT Head ruled out anatomical etiology for MAS, but it may be metabolic in nature. Patient should continue with IVF resuscitation, while continuing to monitor BMP. Patient was found to have Enterococcal UTI so started on Ceftriaxone, which could explain the worsening of leukocytosis, along with steroid use - c/t monitor WBC and fever curve. His oncologist Dr. Sims stated that the ophthalmoplegia was chronic due to his cancer and that we maintain him on maintenance dosing of Florinef and Prednisone.
Mr. Robles is a 61 y/o Albanian speaking M with a PMHx of adenoid CA currently undergoing radiation, HTN, glaucoma, GERD, DM, cataracts who presented with hypotension during radiation. 
Mr. Robles is a 61 y/o English speaking M with a PMHx of adenoid CA currently undergoing radiation, HTN, glaucoma, GERD, DM, cataracts who presented with hypotension during radiation. 
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 CT hed neg for acute path. found to have Enterococcal UTI. Palliative cs for GOC.

## 2023-06-05 NOTE — DISCHARGE NOTE PROVIDER - NSDCMRMEDTOKEN_GEN_ALL_CORE_FT
amLODIPine 5 mg oral tablet: 1 tab(s) orally once a day  Basaglar KwikPen 100 units/mL subcutaneous solution: 10 unit(s) subcutaneous once a day (at bedtime)  brimonidine 0.15% ophthalmic solution: 1 application in each affected eye every 8 hours  dorzolamide 2% ophthalmic solution: 1 drop(s) to each affected eye once a day  dorzolamide-timolol 2.23%-0.68% ophthalmic solution: 1 drop(s) to each affected eye 2 times a day  fentaNYL 25 mcg/hr transdermal film, extended release: 1 film(s) transdermally once a day as needed for  severe pain  fludrocortisone 0.1 mg oral tablet: 1 tab(s) orally once a day In the mornings  gabapentin 100 mg oral capsule: 1 tab(s) orally every 8 hours  latanoprost 0.005% ophthalmic solution: 1 drop(s) to each affected eye once a day (in the evening)  metFORMIN 500 mg oral tablet: 1 tab(s) orally once a day  Metoprolol Succinate ER 25 mg oral tablet, extended release: 1 tab(s) orally once a day  NovoLOG FlexPen 100 units/mL injectable solution: 2 unit(s) injectable 3 times a day  omeprazole 40 mg oral delayed release capsule: 1 cap(s) orally once a day  oxyCODONE 15 mg oral tablet: 1 tab(s) orally every 8 hours as needed for  severe pain  rosuvastatin 10 mg oral capsule: 1 tab(s) orally once a day (at bedtime)  sulfamethoxazole-trimethoprim 800 mg-160 mg oral tablet: 1 tab(s) orally every 12 hours

## 2023-06-05 NOTE — PROGRESS NOTE ADULT - PROBLEM SELECTOR PLAN 7
.  Complex medical decision making / symptom management in the setting of advanced illness.    Coping:  well[  x] with difficulty[  ] poor coping[  ] unable to assess[  ]   Support system: strong[  ] adequate[ x ] inadequate[  ]    - Pending dc this afternoon   - Palliative medicine will sign off.  Please contact Palliative Medicine 24/7 at [phone] if the patient's symptoms and/or goals of care change, need to be readdressed, or if patient is readmitted in the future.

## 2023-06-05 NOTE — CHART NOTE - NSCHARTNOTEFT_GEN_A_CORE
The patient has a mobility limitation that significantly impairs their ability to participate in MRADLs in the home such as toileting or bathing. The patient's mobility limitation cannot be resolved by use of a cane or walker and patient's home has adequate access and space for a wheelchair. The use of a wheelchair will improve the patient's ability to participate in MRADLs and patient will use it on a regular basis in the home. Patient has a willingness and is not limited by strength or endurance to use a wheelchair and has a caregiver in the home who is willing and able to provide assistance. Without a wheelchair patient will be bedbound.

## 2023-06-05 NOTE — PROGRESS NOTE ADULT - PROBLEM SELECTOR PLAN 5
.  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 per wife, pts oncologist is switching pt from "IV to oral cancer treatment because it's not working"  - ?per wife follows with "Dr Guo" --?Dr. Sims  - If no further disease modifying treatments offered or patient/family declined further disease modifying treatments,  patient would qualify for hospice

## 2023-06-06 LAB
CULTURE RESULTS: SIGNIFICANT CHANGE UP
CULTURE RESULTS: SIGNIFICANT CHANGE UP
SPECIMEN SOURCE: SIGNIFICANT CHANGE UP
SPECIMEN SOURCE: SIGNIFICANT CHANGE UP

## 2023-06-15 DIAGNOSIS — E11.9 TYPE 2 DIABETES MELLITUS WITHOUT COMPLICATIONS: ICD-10-CM

## 2023-06-15 DIAGNOSIS — I10 ESSENTIAL (PRIMARY) HYPERTENSION: ICD-10-CM

## 2023-06-15 DIAGNOSIS — C11.1 MALIGNANT NEOPLASM OF POSTERIOR WALL OF NASOPHARYNX: ICD-10-CM

## 2023-06-15 DIAGNOSIS — H49.9 UNSPECIFIED PARALYTIC STRABISMUS: ICD-10-CM

## 2023-06-15 DIAGNOSIS — N17.9 ACUTE KIDNEY FAILURE, UNSPECIFIED: ICD-10-CM

## 2023-06-15 DIAGNOSIS — K21.9 GASTRO-ESOPHAGEAL REFLUX DISEASE WITHOUT ESOPHAGITIS: ICD-10-CM

## 2023-06-15 DIAGNOSIS — D84.9 IMMUNODEFICIENCY, UNSPECIFIED: ICD-10-CM

## 2023-06-15 DIAGNOSIS — Z41.8 ENCOUNTER FOR OTHER PROCEDURES FOR PURPOSES OTHER THAN REMEDYING HEALTH STATE: ICD-10-CM

## 2023-06-15 DIAGNOSIS — G93.41 METABOLIC ENCEPHALOPATHY: ICD-10-CM

## 2023-06-15 DIAGNOSIS — Z79.4 LONG TERM (CURRENT) USE OF INSULIN: ICD-10-CM

## 2023-06-15 DIAGNOSIS — Z88.0 ALLERGY STATUS TO PENICILLIN: ICD-10-CM

## 2023-06-15 DIAGNOSIS — I95.9 HYPOTENSION, UNSPECIFIED: ICD-10-CM

## 2023-06-15 DIAGNOSIS — H40.9 UNSPECIFIED GLAUCOMA: ICD-10-CM

## 2023-06-15 DIAGNOSIS — Z79.84 LONG TERM (CURRENT) USE OF ORAL HYPOGLYCEMIC DRUGS: ICD-10-CM

## 2023-06-15 DIAGNOSIS — R57.8 OTHER SHOCK: ICD-10-CM

## 2023-06-15 DIAGNOSIS — N39.0 URINARY TRACT INFECTION, SITE NOT SPECIFIED: ICD-10-CM

## 2023-10-08 NOTE — VITALS
- full code [Maximal Pain Intensity: 6/10] : 6/10 [Least Pain Intensity: 0/10] : 0/10 [Pain Location: ___] : Pain Location: [unfilled] [Adjuvant (neuroleptics)] : adjuvant (neuroleptics) [80: Normal activity with effort; some signs or symptoms of disease.] : 80: Normal activity with effort; some signs or symptoms of disease.  [ECOG Performance Status: 1 - Restricted in physically strenuous activity but ambulatory and able to carry out work of a light or sedentary nature] : Performance Status: 1 - Restricted in physically strenuous activity but ambulatory and able to carry out work of a light or sedentary nature, e.g., light house work, office work

## 2024-03-24 NOTE — H&P ADULT - PROBLEM/PLAN-2
This patient has been assessed with a concern for Malnutrition and was treated during this hospitalization for the following Nutrition diagnosis/diagnoses:     -  03/23/2024: Severe protein-calorie malnutrition   DISPLAY PLAN FREE TEXT

## 2024-11-05 NOTE — ED ADULT NURSE NOTE - CAS ELECT INFOMATION PROVIDED
Mariely Hunt is a 54 year old female who presents for Office Visit, Medication Issue, and Annual Exam (Fasting/)    Medication Issue      The patient presents for evaluation of multiple medical concerns.    She has been taking levothyroxine for Hashimoto's disease. Despite being prescribed lovastatin for hyperlipidemia, she has not been taking it due to concerns about dependency.    She reports feeling well overall and has been monitoring her blood pressure at home, noting it is within the normal range.    She has not been using any inhalers or nasal sprays. She is currently fasting and has only consumed black coffee today.    She prefers an ultrasound over a mammogram, which she plans to schedule upon her return from [facility] in 2025. She has undergone a colonoscopy in the past.    SOCIAL HISTORY  - Works as a dental assistant    Review of Systems  As documented above.    Objective   Vitals:    11/05/24 1212   BP: (!) 172/94   Pulse: 66   Resp: 20   Temp: 97.8 °F (36.6 °C)   TempSrc: Temporal   Weight: 76.9 kg (169 lb 8.5 oz)   Height: 5' 4.5\" (1.638 m)     Physical Exam  Vitals and nursing note reviewed.   Constitutional:       General: She is not in acute distress.     Appearance: Normal appearance. She is well-developed. She is not ill-appearing, toxic-appearing or diaphoretic.   HENT:      Head: Normocephalic and atraumatic.      Right Ear: Tympanic membrane, ear canal and external ear normal. There is no impacted cerumen.      Left Ear: Tympanic membrane, ear canal and external ear normal. There is no impacted cerumen.      Nose: Nose normal. No congestion or rhinorrhea.      Mouth/Throat:      Mouth: Mucous membranes are moist.      Pharynx: Oropharynx is clear. No oropharyngeal exudate or posterior oropharyngeal erythema.      Neck: Normal range of motion and neck supple.   Eyes:      General:         Right eye: No discharge.         Left eye: No discharge.      Conjunctiva/sclera: Conjunctivae normal.       Pupils: Pupils are equal, round, and reactive to light.   Neck:      Thyroid: No thyromegaly.   Cardiovascular:      Rate and Rhythm: Normal rate and regular rhythm.      Pulses: Normal pulses.      Heart sounds: Normal heart sounds. No murmur heard.  Pulmonary:      Effort: Pulmonary effort is normal. No respiratory distress.      Breath sounds: Normal breath sounds. No stridor. No wheezing, rhonchi or rales.   Chest:      Chest wall: No tenderness.   Abdominal:      General: Abdomen is flat. Bowel sounds are normal. There is no distension.      Palpations: Abdomen is soft. There is no mass.      Tenderness: There is no abdominal tenderness. There is no right CVA tenderness, left CVA tenderness, guarding or rebound.      Hernia: No hernia is present.   Musculoskeletal:         General: Normal range of motion.   Lymphadenopathy:      Cervical: No cervical adenopathy.   Skin:     General: Skin is warm.      Coloration: Skin is not jaundiced or pale.      Findings: No bruising, erythema, lesion or rash.   Neurological:      General: No focal deficit present.      Mental Status: She is alert and oriented to person, place, and time. Mental status is at baseline.   Psychiatric:         Mood and Affect: Mood normal.         Behavior: Behavior normal.         Thought Content: Thought content normal.         Judgment: Judgment normal.             - Laboratory Studies:    - Vitamin D: low    - Prediabetes detected    Assessment & Plan   1. Hashimoto's Thyroiditis - Continues levothyroxine  - No changes to medication    2. Hyperlipidemia - Not taking lovastatin due to concerns  - Advised on risks of unmanaged cholesterol  - Repeat blood test to reassess cholesterol levels    3. Hypertension - Elevated BP last visit  - Advised to monitor BP at home twice daily and report readings  - Medication will be prescribed if home readings are consistent with today's elevated readings  - Recheck BP before leaving    4. Prediabetes -  Previous tests indicated prediabetes  - Repeat blood test to reassess glucose levels  - Advised on healthy diet and exercise    5. Vitamin D Deficiency - Previous tests indicated low vitamin D  - Repeat blood test to reassess levels    6. Health Maintenance - Advised to receive influenza vaccine for flu season, shingles vaccine after 50, and tetanus vaccine every 10 years  - Discussed COVID-19 vaccine  - Recommended mammogram, but patient prefers ultrasound  - Explained importance of mammogram  - Advised to undergo colonoscopy and bring results for documentation    1. Encounter for routine adult health examination without abnormal findings  -     CBC with Automated Differential  -     Comprehensive Metabolic Panel  -     Folate  -     Glycohemoglobin  -     Lipid Panel With Reflex  -     Microalbumin Urine Random  -     Thyroid Stimulating Hormone Reflex  -     Urinalysis & Reflex Microscopy With Culture If Indicated  -     Vitamin B12  -     Vitamin D -25 Hydroxy  2. Elevated blood pressure reading without diagnosis of hypertension  -     CBC with Automated Differential  -     Comprehensive Metabolic Panel  -     Folate  -     Glycohemoglobin  -     Lipid Panel With Reflex  -     Microalbumin Urine Random  -     Thyroid Stimulating Hormone Reflex  -     Urinalysis & Reflex Microscopy With Culture If Indicated  -     Vitamin B12  -     Vitamin D -25 Hydroxy  3. Hypothyroidism due to Hashimoto's thyroiditis  -     levothyroxine 50 MCG tablet; Take 1 tablet by mouth daily.  -     CBC with Automated Differential  -     Comprehensive Metabolic Panel  -     Folate  -     Glycohemoglobin  -     Lipid Panel With Reflex  -     Microalbumin Urine Random  -     Thyroid Stimulating Hormone Reflex  -     Urinalysis & Reflex Microscopy With Culture If Indicated  -     Vitamin B12  -     Vitamin D -25 Hydroxy  4. Diet-controlled hyperlipidemia  -     CBC with Automated Differential  -     Comprehensive Metabolic Panel  -      Folate  -     Glycohemoglobin  -     Lipid Panel With Reflex  -     Microalbumin Urine Random  -     Thyroid Stimulating Hormone Reflex  -     Urinalysis & Reflex Microscopy With Culture If Indicated  -     Vitamin B12  -     Vitamin D -25 Hydroxy  5. Prediabetes  -     CBC with Automated Differential  -     Comprehensive Metabolic Panel  -     Folate  -     Glycohemoglobin  -     Lipid Panel With Reflex  -     Microalbumin Urine Random  -     Thyroid Stimulating Hormone Reflex  -     Urinalysis & Reflex Microscopy With Culture If Indicated  -     Vitamin B12  -     Vitamin D -25 Hydroxy  6. Overweight with body mass index (BMI) of 28 to 28.9 in adult  -     CBC with Automated Differential  -     Comprehensive Metabolic Panel  -     Folate  -     Glycohemoglobin  -     Lipid Panel With Reflex  -     Microalbumin Urine Random  -     Thyroid Stimulating Hormone Reflex  -     Urinalysis & Reflex Microscopy With Culture If Indicated  -     Vitamin B12  -     Vitamin D -25 Hydroxy  7. Vitamin D insufficiency  -     CBC with Automated Differential  -     Comprehensive Metabolic Panel  -     Folate  -     Glycohemoglobin  -     Lipid Panel With Reflex  -     Microalbumin Urine Random  -     Thyroid Stimulating Hormone Reflex  -     Urinalysis & Reflex Microscopy With Culture If Indicated  -     Vitamin B12  -     Vitamin D -25 Hydroxy  8. COVID-19 vaccination refused  -     CBC with Automated Differential  -     Comprehensive Metabolic Panel  -     Folate  -     Glycohemoglobin  -     Lipid Panel With Reflex  -     Microalbumin Urine Random  -     Thyroid Stimulating Hormone Reflex  -     Urinalysis & Reflex Microscopy With Culture If Indicated  -     Vitamin B12  -     Vitamin D -25 Hydroxy  9. Refused influenza vaccine  -     CBC with Automated Differential  -     Comprehensive Metabolic Panel  -     Folate  -     Glycohemoglobin  -     Lipid Panel With Reflex  -     Microalbumin Urine Random  -     Thyroid Stimulating  Hormone Reflex  -     Urinalysis & Reflex Microscopy With Culture If Indicated  -     Vitamin B12  -     Vitamin D -25 Hydroxy  10. Refused varicella vaccine  -     CBC with Automated Differential  -     Comprehensive Metabolic Panel  -     Folate  -     Glycohemoglobin  -     Lipid Panel With Reflex  -     Microalbumin Urine Random  -     Thyroid Stimulating Hormone Reflex  -     Urinalysis & Reflex Microscopy With Culture If Indicated  -     Vitamin B12  -     Vitamin D -25 Hydroxy  11. Refuses tetanus, diphtheria, and acellular pertussis (Tdap) vaccination  -     CBC with Automated Differential  -     Comprehensive Metabolic Panel  -     Folate  -     Glycohemoglobin  -     Lipid Panel With Reflex  -     Microalbumin Urine Random  -     Thyroid Stimulating Hormone Reflex  -     Urinalysis & Reflex Microscopy With Culture If Indicated  -     Vitamin B12  -     Vitamin D -25 Hydroxy  12. Visit for screening mammogram  -     MAMMO SCREENING BILATERAL W VANITA; Future  -     CBC with Automated Differential  -     Comprehensive Metabolic Panel  -     Folate  -     Glycohemoglobin  -     Lipid Panel With Reflex  -     Microalbumin Urine Random  -     Thyroid Stimulating Hormone Reflex  -     Urinalysis & Reflex Microscopy With Culture If Indicated  -     Vitamin B12  -     Vitamin D -25 Hydroxy     Pt and wife verbalized understanding of follow up with oncology in 48 hours. Pt d/c'd via . Pt left ED with wife./DC instructions
